# Patient Record
Sex: FEMALE | Race: BLACK OR AFRICAN AMERICAN | NOT HISPANIC OR LATINO | Employment: UNEMPLOYED | URBAN - METROPOLITAN AREA
[De-identification: names, ages, dates, MRNs, and addresses within clinical notes are randomized per-mention and may not be internally consistent; named-entity substitution may affect disease eponyms.]

---

## 2019-01-07 ENCOUNTER — OFFICE VISIT (OUTPATIENT)
Dept: OBGYN CLINIC | Facility: MEDICAL CENTER | Age: 44
End: 2019-01-07
Payer: COMMERCIAL

## 2019-01-07 VITALS
DIASTOLIC BLOOD PRESSURE: 77 MMHG | HEIGHT: 62 IN | BODY MASS INDEX: 30.14 KG/M2 | SYSTOLIC BLOOD PRESSURE: 117 MMHG | HEART RATE: 73 BPM | WEIGHT: 163.8 LBS

## 2019-01-07 DIAGNOSIS — M77.12 LATERAL EPICONDYLITIS OF LEFT ELBOW: Primary | ICD-10-CM

## 2019-01-07 PROCEDURE — 99203 OFFICE O/P NEW LOW 30 MIN: CPT | Performed by: ORTHOPAEDIC SURGERY

## 2019-01-07 PROCEDURE — 20551 NJX 1 TENDON ORIGIN/INSJ: CPT | Performed by: ORTHOPAEDIC SURGERY

## 2019-01-07 RX ORDER — MULTIVITAMIN
1 TABLET ORAL DAILY
COMMUNITY
End: 2020-12-15

## 2019-01-07 RX ORDER — NAPROXEN 500 MG/1
500 TABLET ORAL 2 TIMES DAILY PRN
Refills: 0 | COMMUNITY
Start: 2018-11-27 | End: 2020-12-15

## 2019-01-07 RX ORDER — TRIAMCINOLONE ACETONIDE 40 MG/ML
40 INJECTION, SUSPENSION INTRA-ARTICULAR; INTRAMUSCULAR
Status: COMPLETED | OUTPATIENT
Start: 2019-01-07 | End: 2019-01-07

## 2019-01-07 RX ORDER — LIDOCAINE HYDROCHLORIDE 10 MG/ML
1 INJECTION, SOLUTION INFILTRATION; PERINEURAL
Status: COMPLETED | OUTPATIENT
Start: 2019-01-07 | End: 2019-01-07

## 2019-01-07 RX ADMIN — TRIAMCINOLONE ACETONIDE 40 MG: 40 INJECTION, SUSPENSION INTRA-ARTICULAR; INTRAMUSCULAR at 09:58

## 2019-01-07 RX ADMIN — LIDOCAINE HYDROCHLORIDE 1 ML: 10 INJECTION, SOLUTION INFILTRATION; PERINEURAL at 09:58

## 2019-01-07 NOTE — PROGRESS NOTES
CHIEF COMPLAINT:  Chief Complaint   Patient presents with    Left Elbow - Pain       SUBJECTIVE:  Patric Francis is a 37y o  year old RHD female who presents for initial evaluation of left elbow pain that started after shoveling show in mid 2018  She denies any trauma or previous injury  Denies numbness or tingling  She has tried an elbow brace, but this caused more pain  She has tried Naproxen with only temporary relief  She previously had xrays at [facility] on 2018  This has not improved over time  PAST MEDICAL HISTORY:  History reviewed  No pertinent past medical history  PAST SURGICAL HISTORY:  Past Surgical History:   Procedure Laterality Date     SECTION      GALLBLADDER SURGERY      KNEE SURGERY         FAMILY HISTORY:  History reviewed  No pertinent family history  SOCIAL HISTORY:  Social History   Substance Use Topics    Smoking status: Never Smoker    Smokeless tobacco: Never Used    Alcohol use No       MEDICATIONS:    Current Outpatient Prescriptions:     Multiple Vitamin (MULTIVITAMIN) tablet, Take 1 tablet by mouth daily, Disp: , Rfl:     naproxen (NAPROSYN) 500 mg tablet, Take 500 mg by mouth 2 (two) times a day as needed, Disp: , Rfl: 0    ALLERGIES:  Allergies   Allergen Reactions    Bactrim [Sulfamethoxazole-Trimethoprim]               REVIEW OF SYSTEMS:  Review of Systems   Constitutional: Negative for chills, fever and unexpected weight change  Advised to f/u with PCP about cough and sore throat  HENT: Positive for sore throat  Negative for hearing loss and nosebleeds  Eyes: Negative for pain, redness and visual disturbance  Respiratory: Positive for cough  Negative for shortness of breath and wheezing  Cardiovascular: Negative for chest pain, palpitations and leg swelling  Gastrointestinal: Negative for abdominal pain, nausea and vomiting  Endocrine: Negative for polydipsia and polyuria     Genitourinary: Negative for dysuria and hematuria  Musculoskeletal: Positive for arthralgias and joint swelling  Negative for myalgias  Skin: Negative for rash and wound  Neurological: Negative for dizziness, numbness and headaches  Psychiatric/Behavioral: Negative for decreased concentration and suicidal ideas  The patient is not nervous/anxious  VITALS:  Vitals:    01/07/19 0935   BP: 117/77   Pulse: 73       LABS:  HgA1c: No results found for: HGBA1C  BMP: No results found for: GLUCOSE, CALCIUM, NA, K, CO2, CL, BUN, CREATININE    _____________________________________________________  PHYSICAL EXAMINATION:  General: well developed and well nourished, alert, oriented times 3 and appears comfortable  Psychiatric: Normal  HEENT: Trachea Midline, No torticollis  Pulmonary: No audible wheezing or respiratory distress   Skin: No masses, erthema, lacerations, fluctation, ulcerations  Neurovascular: Sensation Intact to the Median, Ulnar, Radial Nerve, Motor Intact to the Median, Ulnar, Radial Nerve and Pulses Intact    MUSCULOSKELETAL EXAMINATION:  Left Elbow:    No swelling or deformity  Full range of motion with flexion, extension, supination and pronation  Positive tenderness to palpation over the Lateral Epicondyle  Very minimal tenderness over the medial epicondyle  Pain with passive flexion of the wrist with elbow fully extended  Pain with resisted wrist extension with elbow fully extended  Pain with resisted forearm supination with the elbow fully extended  Negative tinels over the ulnar nerve at the elbow  ___________________________________________________  STUDIES REVIEWED:  Images obtained on 12/20/2018 of the left elbow at [facility] (uploaded into PACS) 4 views demonstrate no evidence of fracture or dislocation  Joint spaces are well maintained  Xrays of the left forearm on 12/20/2018 showed no fracture or pathology        PROCEDURES PERFORMED:  Left elbow lateral epicondylitis cortisone injection  Date/Time: 1/7/2019 9:58 AM  Consent given by: patient  Supporting Documentation  Indications: pain   Procedure Details  Condition:lateral epicondylitis Site: L elbow   Needle size: 25 G  Medications administered: 1 mL lidocaine 1 %; 40 mg triamcinolone acetonide 40 mg/mL  Patient tolerance: patient tolerated the procedure well with no immediate complications  Dressing:  Sterile dressing applied            _____________________________________________________  ASSESSMENT/PLAN:    Left elbow lateral epicondylitis  * Cortisone injection given today  She may have some pain at injection site  *  Heat and stretching exercises daily, and dynamic exercises using 2-3 pound weights  These exercises were demonstrated today  Handout about condition provided  * There may be lightening of the skin at the injection site  * Continue heat and NSAIDS  Follow Up:  Return in about 3 weeks (around 1/28/2019)  To Do Next Visit:  Re-evaluation of current issue    General Discussions:  Lateral Epicondylitis: The anatomy and physiology of lateral epicondylitis was discussed with the patient today  Typically, degenerative changes in the extensor carpi radialis brevis muscle occur over time  These degenerative changes appear as tears of the tendon on MRI  This creates pain over the lateral epicondyle (outside part of elbow)  This pain typically is made worse with palm down lifting activities as well as anything that involves strength and stability of the wrist   The pain may radiate from the wrist up to the elbow  At times, the shoulder may be weak as well which can predispose or cause continuation of the problem  Conservative treatment usually cures a majority of patients; however, this may take up to 6-9 months  Conservative treatment options typically include activity modification, therapy for strengthening of the shoulder and elbow, tennis elbow straps, and possible corticosteroid injections  Corticosteroid injections are very effective at relieving pain but do not alter the natural history of this process  Rather, steroid injections decrease the pain temporarily to allow for therapy to take place without discomfort  It was discussed that therapy to prevent recurrence is a "life long" process and that if patient relies on the steroid injection alone without performing therapeutic exercises the risk of recurrence is likely  Typical home regimen includes heat and stretching and resisted wrist extension exercises discussed in the office  Surgery is required in fewer than 10% of patients      Scribe Attestation    I,:   Brittany Lomeli PA-C am acting as a scribe while in the presence of the attending physician :        I,:   Adonay Morrow MD personally performed the services described in this documentation    as scribed in my presence :

## 2019-01-28 ENCOUNTER — OFFICE VISIT (OUTPATIENT)
Dept: OBGYN CLINIC | Facility: MEDICAL CENTER | Age: 44
End: 2019-01-28
Payer: COMMERCIAL

## 2019-01-28 VITALS
HEIGHT: 62 IN | WEIGHT: 160.4 LBS | RESPIRATION RATE: 16 BRPM | DIASTOLIC BLOOD PRESSURE: 74 MMHG | SYSTOLIC BLOOD PRESSURE: 113 MMHG | BODY MASS INDEX: 29.52 KG/M2 | HEART RATE: 71 BPM

## 2019-01-28 DIAGNOSIS — M77.12 LATERAL EPICONDYLITIS OF LEFT ELBOW: Primary | ICD-10-CM

## 2019-01-28 PROCEDURE — 99212 OFFICE O/P EST SF 10 MIN: CPT | Performed by: ORTHOPAEDIC SURGERY

## 2019-01-28 NOTE — PROGRESS NOTES
CHIEF COMPLAINT:  Chief Complaint   Patient presents with    Left Elbow - Pain       SUBJECTIVE:  Karly Rivas is a 53y o  year old RHD female who presents for f/u to lateral epicondylitis on the left  She was seen on 19 and received a cortisone injection, which helped about 95%  She was also given a HEP which she has been doing  She only uses occasional heat and NSAIDS  PAST MEDICAL HISTORY:  Past Medical History:   Diagnosis Date    Shoulder pain, right 2019       PAST SURGICAL HISTORY:  Past Surgical History:   Procedure Laterality Date     SECTION      GALLBLADDER SURGERY      KNEE SURGERY         FAMILY HISTORY:  Family History   Problem Relation Age of Onset    Rheum arthritis Mother     Cancer Father        SOCIAL HISTORY:  Social History   Substance Use Topics    Smoking status: Never Smoker    Smokeless tobacco: Never Used    Alcohol use No       MEDICATIONS:    Current Outpatient Prescriptions:     Multiple Vitamin (MULTIVITAMIN) tablet, Take 1 tablet by mouth daily, Disp: , Rfl:     naproxen (NAPROSYN) 500 mg tablet, Take 500 mg by mouth 2 (two) times a day as needed, Disp: , Rfl: 0    ALLERGIES:  Allergies   Allergen Reactions    Bactrim [Sulfamethoxazole-Trimethoprim]               REVIEW OF SYSTEMS:  Review of Systems   Constitutional: Negative for chills, fever and unexpected weight change  HENT: Negative for hearing loss, nosebleeds and sore throat  Eyes: Negative for pain, redness and visual disturbance  Respiratory: Negative for cough, shortness of breath and wheezing  Cardiovascular: Negative for chest pain, palpitations and leg swelling  Gastrointestinal: Negative for abdominal pain, nausea and vomiting  Endocrine: Negative for polydipsia and polyuria  Genitourinary: Negative for dysuria and hematuria  Musculoskeletal: Positive for arthralgias  Negative for joint swelling and myalgias  Skin: Negative for rash and wound     Neurological: Negative for dizziness, numbness and headaches  Psychiatric/Behavioral: Negative for decreased concentration and suicidal ideas  The patient is not nervous/anxious  VITALS:  Vitals:    01/28/19 0850   BP: 113/74   Pulse: 71   Resp: 16       LABS:  HgA1c: No results found for: HGBA1C  BMP: No results found for: GLUCOSE, CALCIUM, NA, K, CO2, CL, BUN, CREATININE    _____________________________________________________  PHYSICAL EXAMINATION:  General: well developed and well nourished, alert, oriented times 3 and appears comfortable  Psychiatric: Normal  HEENT: Trachea Midline, No torticollis  Pulmonary: No audible wheezing or respiratory distress   Skin: No masses, erthema, lacerations, fluctation, ulcerations  Neurovascular: Sensation Intact to the Median, Ulnar, Radial Nerve, Motor Intact to the Median, Ulnar, Radial Nerve and Pulses Intact    MUSCULOSKELETAL EXAMINATION:  Left Elbow:    No swelling or deformity  Full range of motion with flexion, extension, supination and pronation  Mildly tender to palpation  No pain with passive flexion of the wrist with elbow fully extended  No pain with resisted wrist extension with elbow fully extended  No pain with resisted forearm supination with the elbow fully extended  Negative tinels over the ulnar nerve at the elbow  ___________________________________________________  STUDIES REVIEWED:  No studies reviewed  PROCEDURES PERFORMED:  Procedures  No Procedures performed today    _____________________________________________________  ASSESSMENT/PLAN:    Left lateral epicondylitis with great improvement after CSI injection  * Continue Heat, NSAIDS and stretching exercises  * F/u in clinic if pain gets worse/doesnt resolve    Diagnoses and all orders for this visit:    Lateral epicondylitis of left elbow        Follow Up:  Return if symptoms worsen or fail to improve        To Do Next Visit:  Re-evaluation of current issue    General Discussions:  Lateral Epicondylitis: The anatomy and physiology of lateral epicondylitis was discussed with the patient today  Typically, degenerative changes in the extensor carpi radialis brevis muscle occur over time  These degenerative changes appear as tears of the tendon on MRI  This creates pain over the lateral epicondyle (outside part of elbow)  This pain typically is made worse with palm down lifting activities as well as anything that involves strength and stability of the wrist   The pain may radiate from the wrist up to the elbow  At times, the shoulder may be weak as well which can predispose or cause continuation of the problem  Conservative treatment usually cures a majority of patients; however, this may take up to 6-9 months  Conservative treatment options typically include activity modification, therapy for strengthening of the shoulder and elbow, tennis elbow straps, and possible corticosteroid injections  Corticosteroid injections are very effective at relieving pain but do not alter the natural history of this process  Rather, steroid injections decrease the pain temporarily to allow for therapy to take place without discomfort  It was discussed that therapy to prevent recurrence is a "life long" process and that if patient relies on the steroid injection alone without performing therapeutic exercises the risk of recurrence is likely  Typical home regimen includes heat and stretching and resisted wrist extension exercises discussed in the office  Surgery is required in fewer than 10% of patients        Scribe Attestation    I,:   Sarah Bryant PA-C am acting as a scribe while in the presence of the attending physician :        I,:   Chris Dorsey MD personally performed the services described in this documentation    as scribed in my presence :

## 2020-04-02 ENCOUNTER — DOCUMENTATION (OUTPATIENT)
Dept: FAMILY MEDICINE CLINIC | Facility: CLINIC | Age: 45
End: 2020-04-02

## 2020-04-02 ENCOUNTER — TELEPHONE (OUTPATIENT)
Dept: FAMILY MEDICINE CLINIC | Facility: CLINIC | Age: 45
End: 2020-04-02

## 2020-07-13 ENCOUNTER — TELEPHONE (OUTPATIENT)
Dept: FAMILY MEDICINE CLINIC | Facility: CLINIC | Age: 45
End: 2020-07-13

## 2020-07-13 NOTE — TELEPHONE ENCOUNTER
Patient has a hard bump on the roof of her mouth, she used salt water and she said its hurts to eat anything hot or hard  Is there something she can get otc, any prescription?   She has a bactrum allergy  Her pharmacy is [facility]    Patient ph # [phone]

## 2020-07-13 NOTE — TELEPHONE ENCOUNTER
Can try anbesol or something like that she'd have to look at the pharmacy-chances are it'll resolve in a few days but if not she should have an oral surgeon look at it

## 2020-08-29 ENCOUNTER — HOSPITAL ENCOUNTER (EMERGENCY)
Facility: HOSPITAL | Age: 45
Discharge: HOME | End: 2020-08-29
Attending: EMERGENCY MEDICINE | Admitting: EMERGENCY MEDICINE
Payer: COMMERCIAL

## 2020-08-29 ENCOUNTER — APPOINTMENT (EMERGENCY)
Dept: CT IMAGING | Facility: HOSPITAL | Age: 45
End: 2020-08-29
Payer: COMMERCIAL

## 2020-08-29 VITALS
RESPIRATION RATE: 16 BRPM | OXYGEN SATURATION: 99 % | WEIGHT: 176.15 LBS | DIASTOLIC BLOOD PRESSURE: 73 MMHG | TEMPERATURE: 98.4 F | SYSTOLIC BLOOD PRESSURE: 112 MMHG | HEART RATE: 72 BPM | BODY MASS INDEX: 32.22 KG/M2

## 2020-08-29 DIAGNOSIS — G51.0 BELL'S PALSY: Primary | ICD-10-CM

## 2020-08-29 LAB
ALBUMIN SERPL BCP-MCNC: 3.6 G/DL (ref 3.5–5)
ALP SERPL-CCNC: 78 U/L (ref 46–116)
ALT SERPL W P-5'-P-CCNC: 22 U/L (ref 12–78)
ANION GAP SERPL CALCULATED.3IONS-SCNC: 9 MMOL/L (ref 4–13)
AST SERPL W P-5'-P-CCNC: 17 U/L (ref 5–45)
BASOPHILS # BLD AUTO: 0.03 THOUSANDS/ΜL (ref 0–0.1)
BASOPHILS NFR BLD AUTO: 1 % (ref 0–1)
BILIRUB SERPL-MCNC: 0.19 MG/DL (ref 0.2–1)
BUN SERPL-MCNC: 12 MG/DL (ref 5–25)
CALCIUM SERPL-MCNC: 8.7 MG/DL (ref 8.3–10.1)
CHLORIDE SERPL-SCNC: 104 MMOL/L (ref 100–108)
CO2 SERPL-SCNC: 26 MMOL/L (ref 21–32)
CREAT SERPL-MCNC: 0.95 MG/DL (ref 0.6–1.3)
EOSINOPHIL # BLD AUTO: 0.1 THOUSAND/ΜL (ref 0–0.61)
EOSINOPHIL NFR BLD AUTO: 2 % (ref 0–6)
ERYTHROCYTE [DISTWIDTH] IN BLOOD BY AUTOMATED COUNT: 12.6 % (ref 11.6–15.1)
GFR SERPL CREATININE-BSD FRML MDRD: 82 ML/MIN/1.73SQ M
GLUCOSE SERPL-MCNC: 110 MG/DL (ref 65–140)
HCT VFR BLD AUTO: 37.2 % (ref 34.8–46.1)
HGB BLD-MCNC: 11.7 G/DL (ref 11.5–15.4)
IMM GRANULOCYTES # BLD AUTO: 0.01 THOUSAND/UL (ref 0–0.2)
IMM GRANULOCYTES NFR BLD AUTO: 0 % (ref 0–2)
LYMPHOCYTES # BLD AUTO: 2.39 THOUSANDS/ΜL (ref 0.6–4.47)
LYMPHOCYTES NFR BLD AUTO: 40 % (ref 14–44)
MCH RBC QN AUTO: 28.3 PG (ref 26.8–34.3)
MCHC RBC AUTO-ENTMCNC: 31.5 G/DL (ref 31.4–37.4)
MCV RBC AUTO: 90 FL (ref 82–98)
MONOCYTES # BLD AUTO: 0.29 THOUSAND/ΜL (ref 0.17–1.22)
MONOCYTES NFR BLD AUTO: 5 % (ref 4–12)
NEUTROPHILS # BLD AUTO: 3.1 THOUSANDS/ΜL (ref 1.85–7.62)
NEUTS SEG NFR BLD AUTO: 52 % (ref 43–75)
NRBC BLD AUTO-RTO: 0 /100 WBCS
PLATELET # BLD AUTO: 290 THOUSANDS/UL (ref 149–390)
PMV BLD AUTO: 9.4 FL (ref 8.9–12.7)
POTASSIUM SERPL-SCNC: 3.5 MMOL/L (ref 3.5–5.3)
PROT SERPL-MCNC: 8.4 G/DL (ref 6.4–8.2)
RBC # BLD AUTO: 4.14 MILLION/UL (ref 3.81–5.12)
SODIUM SERPL-SCNC: 139 MMOL/L (ref 136–145)
WBC # BLD AUTO: 5.92 THOUSAND/UL (ref 4.31–10.16)

## 2020-08-29 PROCEDURE — G1004 CDSM NDSC: HCPCS

## 2020-08-29 PROCEDURE — 85025 COMPLETE CBC W/AUTO DIFF WBC: CPT | Performed by: EMERGENCY MEDICINE

## 2020-08-29 PROCEDURE — 99284 EMERGENCY DEPT VISIT MOD MDM: CPT

## 2020-08-29 PROCEDURE — 36415 COLL VENOUS BLD VENIPUNCTURE: CPT | Performed by: EMERGENCY MEDICINE

## 2020-08-29 PROCEDURE — 86618 LYME DISEASE ANTIBODY: CPT | Performed by: EMERGENCY MEDICINE

## 2020-08-29 PROCEDURE — 70450 CT HEAD/BRAIN W/O DYE: CPT

## 2020-08-29 PROCEDURE — 99284 EMERGENCY DEPT VISIT MOD MDM: CPT | Performed by: EMERGENCY MEDICINE

## 2020-08-29 PROCEDURE — 80053 COMPREHEN METABOLIC PANEL: CPT | Performed by: EMERGENCY MEDICINE

## 2020-08-29 RX ORDER — PREDNISONE 20 MG/1
60 TABLET ORAL ONCE
Status: COMPLETED | OUTPATIENT
Start: 2020-08-29 | End: 2020-08-29

## 2020-08-29 RX ORDER — PREDNISONE 10 MG/1
TABLET ORAL
Qty: 63 TABLET | Refills: 0 | Status: SHIPPED | OUTPATIENT
Start: 2020-08-29 | End: 2020-12-15

## 2020-08-29 RX ORDER — MINERAL OIL, PETROLATUM 425; 573 MG/G; MG/G
OINTMENT OPHTHALMIC AS NEEDED
Qty: 3.5 G | Refills: 0 | Status: SHIPPED | OUTPATIENT
Start: 2020-08-29 | End: 2020-12-15

## 2020-08-29 RX ADMIN — FLUORESCEIN SODIUM 1 STRIP: 1 STRIP OPHTHALMIC at 21:41

## 2020-08-29 RX ADMIN — PREDNISONE 60 MG: 20 TABLET ORAL at 21:40

## 2020-08-29 NOTE — ED PROVIDER NOTES
History  Chief Complaint   Patient presents with    Facial Numbness     leftsided facial facial numbness seen at [facility] sent to ER for evaluation  symptoms began last night      49 y/o female presents today reporting right sided facial weakness that started approximately 24 hours ago  States that her right eye is irritated and watery  No difficulty speaking or swallowing  Did have a hard time drinking from a straw tonight while at dinner  No extremity weakness, no difficulty walking  [facility] and sent here for further evaluation  History provided by:  Patient  CVA/TIA-like Symptoms   Presenting symptoms: weakness (right face)    Presenting symptoms: no confusion    Date/time of last known well:  2020 7:00 PM  Onset quality:  Unable to specify  Timing:  Constant  Progression:  Unable to specify  Similar to previous episodes: no    Associated symptoms: no chest pain, no trouble swallowing, no dizziness, no facial pain, no fever, no hearing loss, no bladder incontinence, no nausea, no seizures, no tinnitus and no vertigo        Prior to Admission Medications   Prescriptions Last Dose Informant Patient Reported? Taking?    Multiple Vitamin (MULTIVITAMIN) tablet  Self Yes No   Sig: Take 1 tablet by mouth daily   naproxen (NAPROSYN) 500 mg tablet  Self Yes No   Sig: Take 500 mg by mouth 2 (two) times a day as needed      Facility-Administered Medications: None       Past Medical History:   Diagnosis Date    Allergies     bactrim, IV contrast dye    Shoulder pain, right 2019       Past Surgical History:   Procedure Laterality Date     SECTION      CHOLECYSTECTOMY  2016    COLONOSCOPY  2019    COLONOSCOPY  2016    GALLBLADDER SURGERY      KNEE SURGERY      TUBAL LIGATION         Family History   Problem Relation Age of Onset    Rheum arthritis Mother     Lupus Mother     Cancer Father     Heart disease Father         bypass    Melanoma Father     Liver cancer Maternal Grandmother      I have reviewed and agree with the history as documented  E-Cigarette/Vaping     E-Cigarette/Vaping Substances     Social History     Tobacco Use    Smoking status: Never Smoker    Smokeless tobacco: Never Used   Substance Use Topics    Alcohol use: Yes     Comment: occasional    Drug use: No       Review of Systems   Constitutional: Negative for fever  HENT: Negative for congestion, facial swelling, hearing loss, sinus pressure, tinnitus and trouble swallowing  Eyes: Negative for visual disturbance  Respiratory: Negative for chest tightness and shortness of breath  Cardiovascular: Negative for chest pain and leg swelling  Gastrointestinal: Negative for nausea  Genitourinary: Negative for bladder incontinence  Musculoskeletal: Negative for back pain  Neurological: Positive for facial asymmetry and weakness (right face)  Negative for dizziness, vertigo and seizures  Psychiatric/Behavioral: Negative for confusion  Physical Exam  Physical Exam  Vitals signs and nursing note reviewed  Constitutional:       Appearance: She is well-developed  HENT:      Head: Normocephalic and atraumatic  Mouth/Throat:      Mouth: Mucous membranes are moist       Pharynx: Uvula midline  Tonsils: No tonsillar exudate  Eyes:      General:         Right eye: Discharge (watery) present  No foreign body or hordeolum  Extraocular Movements:      Right eye: Normal extraocular motion  Left eye: Normal extraocular motion  Conjunctiva/sclera:      Right eye: Right conjunctiva is injected (mild)  Pupils: Pupils are equal, round, and reactive to light  Right eye: No corneal abrasion or fluorescein uptake  Comments: No fluorescein uptake in the right eye   Neck:      Musculoskeletal: Normal range of motion and neck supple  Cardiovascular:      Rate and Rhythm: Normal rate and regular rhythm     Pulmonary:      Effort: Pulmonary effort is normal       Breath sounds: Normal breath sounds  Abdominal:      General: Bowel sounds are normal       Palpations: Abdomen is soft  Tenderness: There is no abdominal tenderness  There is no guarding or rebound  Musculoskeletal:         General: No tenderness or deformity  Skin:     General: Skin is warm and dry  Capillary Refill: Capillary refill takes less than 2 seconds  Neurological:      Mental Status: She is alert  Comments: Patient has a mild right facial droop which involves the right eye and the right forehead  She is unable to completely close her right eye         Psychiatric:         Mood and Affect: Mood normal          Behavior: Behavior normal          Vital Signs  ED Triage Vitals [08/29/20 1916]   Temperature Pulse Respirations Blood Pressure SpO2   98 4 °F (36 9 °C) 70 18 145/76 100 %      Temp Source Heart Rate Source Patient Position - Orthostatic VS BP Location FiO2 (%)   Oral Monitor Sitting Right arm --      Pain Score       --           Vitals:    08/29/20 2015 08/29/20 2030 08/29/20 2045 08/29/20 2100   BP: 114/65 112/68  112/73   Pulse: 70 68 70 72   Patient Position - Orthostatic VS: Sitting Sitting  Sitting         Visual Acuity  Visual Acuity      Most Recent Value   L Pupil Size (mm)  4   R Pupil Size (mm)  4          ED Medications  Medications   predniSONE tablet 60 mg (60 mg Oral Given 8/29/20 2140)   fluorescein sodium sterile ophthalmic strip 1 strip (1 strip Right Eye Given by Other 8/29/20 2141)       Diagnostic Studies  Results Reviewed     Procedure Component Value Units Date/Time    Comprehensive metabolic panel [[id number]]  (Abnormal) Collected:  08/29/20 2014    Lab Status:  Final result Specimen:  Blood from Arm, Left Updated:  08/29/20 2035     Sodium 139 mmol/L      Potassium 3 5 mmol/L      Chloride 104 mmol/L      CO2 26 mmol/L      ANION GAP 9 mmol/L      BUN 12 mg/dL      Creatinine 0 95 mg/dL      Glucose 110 mg/dL Calcium 8 7 mg/dL      AST 17 U/L      ALT 22 U/L      Alkaline Phosphatase 78 U/L      Total Protein 8 4 g/dL      Albumin 3 6 g/dL      Total Bilirubin 0 19 mg/dL      eGFR 82 ml/min/1 73sq m     Narrative:       [place] guidelines for Chronic Kidney Disease (CKD):     Stage 1 with normal or high GFR (GFR > 90 mL/min/1 73 square meters)    Stage 2 Mild CKD (GFR = 60-89 mL/min/1 73 square meters)    Stage 3A Moderate CKD (GFR = 45-59 mL/min/1 73 square meters)    Stage 3B Moderate CKD (GFR = 30-44 mL/min/1 73 square meters)    Stage 4 Severe CKD (GFR = 15-29 mL/min/1 73 square meters)    Stage 5 End Stage CKD (GFR <15 mL/min/1 73 square meters)  Note: GFR calculation is accurate only with a steady state creatinine    CBC and differential [[id number]] Collected:  08/29/20 2014    Lab Status:  Final result Specimen:  Blood from Arm, Left Updated:  08/29/20 2021     WBC 5 92 Thousand/uL      RBC 4 14 Million/uL      Hemoglobin 11 7 g/dL      Hematocrit 37 2 %      MCV 90 fL      MCH 28 3 pg      MCHC 31 5 g/dL      RDW 12 6 %      MPV 9 4 fL      Platelets 913 Thousands/uL      nRBC 0 /100 WBCs      Neutrophils Relative 52 %      Immat GRANS % 0 %      Lymphocytes Relative 40 %      Monocytes Relative 5 %      Eosinophils Relative 2 %      Basophils Relative 1 %      Neutrophils Absolute 3 10 Thousands/µL      Immature Grans Absolute 0 01 Thousand/uL      Lymphocytes Absolute 2 39 Thousands/µL      Monocytes Absolute 0 29 Thousand/µL      Eosinophils Absolute 0 10 Thousand/µL      Basophils Absolute 0 03 Thousands/µL     Lyme Antibody Profile with reflex to [facility] [[id number]] Collected:  08/29/20 2014    Lab Status: In process Specimen:  Blood from Arm, Left Updated:  08/29/20 2019                 CT head without contrast   Final Result by Godfrey Rogers MD (08/29 2047)      No acute intracranial abnormality                    Workstation performed: [id number] Procedures  Procedures         ED Course       US AUDIT      Most Recent Value   Initial Alcohol Screen: US AUDIT-C    1  How often do you have a drink containing alcohol?  0 Filed at: 08/29/2020 1915   2  How many drinks containing alcohol do you have on a typical day you are drinking? 0 Filed at: 08/29/2020 1915   3a  Male UNDER 65: How often do you have five or more drinks on one occasion? 0 Filed at: 08/29/2020 1915   3b  FEMALE Any Age, or MALE 65+: How often do you have 4 or more drinks on one occassion? 0 Filed at: 08/29/2020 1915   Audit-C Score  0 Filed at: 08/29/2020 1915                  HARPREET/DAST-10      Most Recent Value   How many times in the past year have you    Used an illegal drug or used a prescription medication for non-medical reasons? Never Filed at: 08/29/2020 1915                                MDM  Number of Diagnoses or Management Options  Bell's palsy: new and requires workup  Diagnosis management comments: 10:00 PM  Late entry - physical exam is consistent with [place] Palsy  CT head negative  Only facial weakness that does involve the eyelid and forehead on the right  Stained the right eye with Fluroscein without uptake  CT head negative for acute pathology  Labs unremarkable  Lyme titer pending, however patient has no known tick exposure to her knowledge  History and physical most consistent with a Bell's Palsy  Will treat with prednisone and recommend f/u with PCP as outpatient  Given prescription for lacri-lube and education provided on eye protection, including taping the eye closed at night while sleeping  Patient is stable for discharge  RTED instructions reviewed            Amount and/or Complexity of Data Reviewed  Clinical lab tests: ordered and reviewed  Tests in the radiology section of CPT®: ordered and reviewed  Tests in the medicine section of CPT®: ordered and reviewed  Independent visualization of images, tracings, or specimens: yes    Risk of Complications, Morbidity, and/or Mortality  Presenting problems: high  Diagnostic procedures: high  Management options: high    Patient Progress  Patient progress: stable        Disposition  Final diagnoses:   Bell's palsy     Time reflects when diagnosis was documented in both MDM as applicable and the Disposition within this note     Time User Action Codes Description Comment    8/29/2020  8:33 PM Bettina Romero Add [G51 0] Bell's palsy       ED Disposition     ED Disposition Condition Date/Time Comment    Discharge Stable Sat Aug 29, 2020  9:06 PM Sonalkim Llanoss discharge to home/self care  Follow-up Information     Follow up With Specialties Details Why Contact Info Additional Information    Pranay Matias MD Pediatrics Schedule an appointment as soon as possible for a visit  As needed [address]  [facility] [address]  [facility] Schedule an appointment as soon as possible for a visit   [address] [facility] [address] [facility], [address] [facility] [address] [address] Emergency Department Emergency Medicine  If symptoms worsen [address] [facility] [address], [address], [place], [place], [address]          Discharge Medication List as of 8/29/2020  9:07 PM      START taking these medications    Details   predniSONE 10 mg tablet Take 6 tabs PO for 3 days, then 5 tabs PO for 3 days, then 4 tabs PO for 3 days, then 3 tabs PO for 3 days, then 2 tabs PO for 3 days then 1 tab PO for 3 days  , Normal      White Petrolatum-Mineral Oil (artificial tears) Administer to the right eye as needed for dry eyes, Starting Sat 8/29/2020, Normal         CONTINUE these medications which have NOT CHANGED    Details   Multiple Vitamin (MULTIVITAMIN) tablet Take 1 tablet by mouth daily, Historical Med      naproxen (NAPROSYN) 500 mg tablet Take 500 mg by mouth 2 (two) times a day as needed, Starting Tue 11/27/2018, Historical Med           No discharge procedures on file      PDMP Review     None          ED Provider  Electronically Signed by           Dillon Luna DO  08/29/20 8829

## 2020-08-30 NOTE — DISCHARGE INSTRUCTIONS
You should use saline drops for eye hydration during the day  Use a water based eye lubricant (such as Genteal PM) at night to prevent dryness  Gently tape your eye closed at night after using lacri-lube to keep eye moisturized and prevent abrasion/injury to the eye

## 2020-09-01 LAB — B BURGDOR IGG+IGM SER-ACNC: <0.91 ISR (ref 0–0.9)

## 2020-09-03 ENCOUNTER — APPOINTMENT (OUTPATIENT)
Dept: LAB | Facility: CLINIC | Age: 45
End: 2020-09-03
Payer: COMMERCIAL

## 2020-09-03 ENCOUNTER — CONSULT (OUTPATIENT)
Dept: NEUROLOGY | Facility: CLINIC | Age: 45
End: 2020-09-03
Payer: COMMERCIAL

## 2020-09-03 VITALS
DIASTOLIC BLOOD PRESSURE: 73 MMHG | TEMPERATURE: 97.3 F | WEIGHT: 176.6 LBS | RESPIRATION RATE: 16 BRPM | HEART RATE: 70 BPM | SYSTOLIC BLOOD PRESSURE: 128 MMHG | BODY MASS INDEX: 32.5 KG/M2 | HEIGHT: 62 IN

## 2020-09-03 DIAGNOSIS — H53.2 DOUBLE VISION: ICD-10-CM

## 2020-09-03 DIAGNOSIS — G51.0 FACIAL PARALYSIS ON RIGHT SIDE: ICD-10-CM

## 2020-09-03 DIAGNOSIS — G51.0 FACIAL PARALYSIS ON RIGHT SIDE: Primary | ICD-10-CM

## 2020-09-03 LAB
BUN SERPL-MCNC: 10 MG/DL (ref 5–25)
CREAT SERPL-MCNC: 0.75 MG/DL (ref 0.6–1.3)
CRP SERPL QL: <3 MG/L
ERYTHROCYTE [SEDIMENTATION RATE] IN BLOOD: 36 MM/HOUR (ref 0–19)
GFR SERPL CREATININE-BSD FRML MDRD: 110 ML/MIN/1.73SQ M

## 2020-09-03 PROCEDURE — 86430 RHEUMATOID FACTOR TEST QUAL: CPT

## 2020-09-03 PROCEDURE — 99205 OFFICE O/P NEW HI 60 MIN: CPT | Performed by: PSYCHIATRY & NEUROLOGY

## 2020-09-03 PROCEDURE — 86140 C-REACTIVE PROTEIN: CPT

## 2020-09-03 PROCEDURE — 86617 LYME DISEASE ANTIBODY: CPT

## 2020-09-03 PROCEDURE — 86235 NUCLEAR ANTIGEN ANTIBODY: CPT

## 2020-09-03 PROCEDURE — 85652 RBC SED RATE AUTOMATED: CPT

## 2020-09-03 PROCEDURE — 82565 ASSAY OF CREATININE: CPT

## 2020-09-03 PROCEDURE — 86038 ANTINUCLEAR ANTIBODIES: CPT

## 2020-09-03 PROCEDURE — 36415 COLL VENOUS BLD VENIPUNCTURE: CPT

## 2020-09-03 PROCEDURE — 86225 DNA ANTIBODY NATIVE: CPT

## 2020-09-03 PROCEDURE — 84520 ASSAY OF UREA NITROGEN: CPT

## 2020-09-03 PROCEDURE — 86618 LYME DISEASE ANTIBODY: CPT

## 2020-09-03 RX ORDER — LORAZEPAM 1 MG/1
TABLET ORAL
Qty: 2 TABLET | Refills: 0 | Status: SHIPPED | OUTPATIENT
Start: 2020-09-03 | End: 2020-10-07 | Stop reason: ALTCHOICE

## 2020-09-03 NOTE — PROGRESS NOTES
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 - Primary    Relevant Medications    LORazepam (ATIVAN) 1 mg tablet    Other Relevant Orders    MRI brain MS wo and w contrast    XIOMY Screen w/ Reflex to Titer/Pattern    dsDNA Antibody by IFA, Crithidia luciliae, with Reflex to Titer    Sjogren's Antibodies    Sm/RNP Antibody    RF Screen w/ Reflex to Titer    Lyme Antibody Profile with reflex to WB    Sedimentation rate, automated    C-reactive protein    Ambulatory referral to Physical Therapy       Other    Double vision    Relevant Orders    BUN    Creatinine, serum    XIOMY Screen w/ Reflex to Titer/Pattern    dsDNA Antibody by IFA, Crithidia luciliae, with Reflex to Titer    Sjogren's Antibodies    Sm/RNP Antibody    RF Screen w/ Reflex to Titer    Lyme Antibody Profile with reflex to WB    Sedimentation rate, automated    C-reactive protein            Mrs Art Woods has presented to [address] [place] [address] for evaluation of right facial weakness and left facial numbness:  - patient developed sensory dysfunction left side if her face, right facial paralysis and double vision on horizontal gaze to the right   - considering strong family history of SLE/RA, concern was brought for multiple craniopathies  in the setting of underlying undiagnosed autoimmune dysfunction  - serologic work up and MRI brain w/wo highly advised to rule out CNS demyelination due to MS/NMO/SLE/Sjogren's  - patient completed prednisone, no antibacterial therapy provided, Lyme was negative, symptoms of right facial paralysis are mild   - PT and acupuncture will be advised   - no other focal neurologic deficit noted, recent travel to [address] [place] [address]    FU in 2 months     Subjective: right facial weakness    HPI  Mrs Art Woods is a 49 yo female who was referred to [address] [place] [address] for evaluation of facial paralysis  Patient has known ascites 2009, lateral epicondylitis  ER visit on 2020:prednisone 60 mg was provided  leftsided facial facial numbness seen at [facility] sent to ER for evaluation  symptoms began last night       CT head 2020: normal  Patient described staying in door on 1 leg birthday but on finding morning she started developing life facial numbness, while her  and her cell were outside and traveling to [place]  Patient also had lunch outside same day with right sided facial weakness has been recognized by evening time during the during the  Patient reports no hearing loss, no changes in her taste, no significant headache  Patient also reports her eye were watering and by late night she was recognized to have droopy face patient went to emergency department on Saturday morning and was diagnosed with Bell's palsy  Patient family history significant for lupus and rheumatoid arthritis in her mother  The following portions of the patient's history were reviewed and updated as appropriate: She  has a past medical history of Allergies and Shoulder pain, right (2019)  She   Patient Active Problem List    Diagnosis Date Noted    Double vision 2020    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 2020    Lateral epicondylitis of left elbow 2019     She  has a past surgical history that includes Knee surgery; Gallbladder surgery;  section; Tubal ligation; Colonoscopy (2019); Cholecystectomy (2016); and Colonoscopy (2016)  Her family history includes Cancer in her father; Heart disease in her father; Liver cancer in her maternal grandmother; Lupus in her mother; Melanoma in her father; Rheum arthritis in her mother  She  reports that she has never smoked  She has never used smokeless tobacco  She reports current alcohol use  She reports that she does not use drugs    Current Outpatient Medications   Medication Sig Dispense Refill    predniSONE 10 mg tablet Take 6 tabs PO for 3 days, then 5 tabs PO for 3 days, then 4 tabs PO for 3 days, then 3 tabs PO for 3 days, then 2 tabs PO for 3 days then 1 tab PO for 3 days  63 tablet 0    White Petrolatum-Mineral Oil (artificial tears) Administer to the right eye as needed for dry eyes 3 5 g 0    LORazepam (ATIVAN) 1 mg tablet Take 1 tab 40 min prior to MRI with a second tab 10 min prior to imaging 2 tablet 0    Multiple Vitamin (MULTIVITAMIN) tablet Take 1 tablet by mouth daily      naproxen (NAPROSYN) 500 mg tablet Take 500 mg by mouth 2 (two) times a day as needed  0     No current facility-administered medications for this visit  Current Outpatient Medications on File Prior to Visit   Medication Sig    predniSONE 10 mg tablet Take 6 tabs PO for 3 days, then 5 tabs PO for 3 days, then 4 tabs PO for 3 days, then 3 tabs PO for 3 days, then 2 tabs PO for 3 days then 1 tab PO for 3 days   White Petrolatum-Mineral Oil (artificial tears) Administer to the right eye as needed for dry eyes    Multiple Vitamin (MULTIVITAMIN) tablet Take 1 tablet by mouth daily    naproxen (NAPROSYN) 500 mg tablet Take 500 mg by mouth 2 (two) times a day as needed     No current facility-administered medications on file prior to visit  She is allergic to bactrim [sulfamethoxazole-trimethoprim]            Objective:    Blood pressure 128/73, pulse 70, temperature (!) 97 3 °F (36 3 °C), temperature source Temporal, resp  rate 16, height 5' 2" (1 575 m), weight 80 1 kg (176 lb 9 6 oz), last menstrual period 08/22/2020  Physical Exam    Neurological Exam  CONSTITUTIONAL: NAD, pleasant  NECK: supple, no lymphadenopathy, no thyromegaly, no JVD  CARDIOVASCULAR: RRR, normal S1S2, no murmurs, no rubs  RESP: clear to auscultation bilaterally, no wheezes/rhonchi/rales  ABDOMEN: soft, non tender, non distended  SKIN: no rash or skin lesions   EXTREMITIES: no edema, pulses 2+bilaterally  PSYCH: appropriate mood and affect  NEUROLOGIC COMPREHENSIVE EXAM: Patient is oriented to person, place and time, NAD; appropriate affect  CN II, III, IV, V, VI, VII,VIII,IX,X,XI-XII intact with EOMI, PERRLA, OKN intact, VF grossly intact, fundi poorly visualized secondary to pupillary constriction; mild asymmetric face noted, full closure of her right eye; numbness left lower face, double vision on horizontal gaze to the right  Motor: 5/5 UE/LE bilateral symmetric; Sensory: intact to light touch and pinprick bilaterally; normal vibration sensation feet bilaterally; Coordination within normal limits on FTN and CHRISTI testing; DTR: 2/4 through, no Babinski, no clonus  Tandem gait is intact  Romberg: negative  ROS:  12 points of review of system was reviewed with the patient and was unremarkable with exception: see HPI  Review of Systems  Constitutional: Negative  Negative for appetite change and fever  HENT: Negative  Negative for hearing loss, tinnitus, trouble swallowing and voice change  Mouth swelling   Eyes: Positive for photophobia  Negative for pain  Feel like sand in the eye   Respiratory: Negative  Negative for shortness of breath  Cardiovascular: Negative  Negative for palpitations  Gastrointestinal: Negative  Negative for nausea and vomiting  Endocrine: Negative  Negative for cold intolerance  Genitourinary: Negative  Negative for dysuria, frequency and urgency  Musculoskeletal: Negative  Negative for myalgias and neck pain  Skin: Negative  Negative for rash  Allergic/Immunologic: Negative  Neurological: Positive for dizziness, weakness, numbness (tingling in the eyes) and headaches  Negative for tremors, seizures, syncope, facial asymmetry, speech difficulty and light-headedness  Hard to chew on right side of face and pressure  Trouble focusing   Hematological: Negative  Does not bruise/bleed easily  Psychiatric/Behavioral: Negative   Negative for confusion, hallucinations and sleep disturbance

## 2020-09-03 NOTE — PROGRESS NOTES
Review of Systems   Constitutional: Negative  Negative for appetite change and fever  HENT: Negative  Negative for hearing loss, tinnitus, trouble swallowing and voice change  Mouth swelling   Eyes: Positive for photophobia  Negative for pain  Feel like sand in the eye   Respiratory: Negative  Negative for shortness of breath  Cardiovascular: Negative  Negative for palpitations  Gastrointestinal: Negative  Negative for nausea and vomiting  Endocrine: Negative  Negative for cold intolerance  Genitourinary: Negative  Negative for dysuria, frequency and urgency  Musculoskeletal: Negative  Negative for myalgias and neck pain  Skin: Negative  Negative for rash  Allergic/Immunologic: Negative  Neurological: Positive for dizziness, weakness, numbness (tingling in the eyes) and headaches  Negative for tremors, seizures, syncope, facial asymmetry, speech difficulty and light-headedness  Hard to chew on right side of face and pressure  Trouble focusing   Hematological: Negative  Does not bruise/bleed easily  Psychiatric/Behavioral: Negative  Negative for confusion, hallucinations and sleep disturbance

## 2020-09-04 DIAGNOSIS — G51.0 FACIAL PARALYSIS ON RIGHT SIDE: Primary | ICD-10-CM

## 2020-09-04 DIAGNOSIS — R76.8 ANTI-RNP ANTIBODIES PRESENT: ICD-10-CM

## 2020-09-04 LAB
ENA RNP AB SER-ACNC: 1.5 AI (ref 0–0.9)
ENA SM AB SER-ACNC: <0.2 AI (ref 0–0.9)
ENA SS-A AB SER-ACNC: <0.2 AI (ref 0–0.9)
ENA SS-B AB SER-ACNC: <0.2 AI (ref 0–0.9)
RHEUMATOID FACT SER QL LA: NEGATIVE
RYE IGE QN: NEGATIVE

## 2020-09-05 LAB
B BURGDOR IGG PATRN SER IB-IMP: NEGATIVE
B BURGDOR IGG+IGM SER-ACNC: 0.94 ISR (ref 0–0.9)
B BURGDOR IGM PATRN SER IB-IMP: NEGATIVE
B BURGDOR18KD IGG SER QL IB: ABNORMAL
B BURGDOR23KD IGG SER QL IB: ABNORMAL
B BURGDOR23KD IGM SER QL IB: ABNORMAL
B BURGDOR28KD IGG SER QL IB: ABNORMAL
B BURGDOR30KD IGG SER QL IB: ABNORMAL
B BURGDOR39KD IGG SER QL IB: ABNORMAL
B BURGDOR39KD IGM SER QL IB: PRESENT
B BURGDOR41KD IGG SER QL IB: PRESENT
B BURGDOR41KD IGM SER QL IB: ABNORMAL
B BURGDOR45KD IGG SER QL IB: ABNORMAL
B BURGDOR58KD IGG SER QL IB: ABNORMAL
B BURGDOR66KD IGG SER QL IB: ABNORMAL
B BURGDOR93KD IGG SER QL IB: ABNORMAL

## 2020-09-08 ENCOUNTER — TELEPHONE (OUTPATIENT)
Dept: NEUROLOGY | Facility: CLINIC | Age: 45
End: 2020-09-08

## 2020-09-08 NOTE — TELEPHONE ENCOUNTER
----- Message from Chago Neff MD sent at 9/4/2020  5:20 PM EDT -----  Please review mildly elevated RNP antibodies with the patient, concern for lupus - she is to follow with rheumatology, referral is available

## 2020-09-08 NOTE — PROGRESS NOTES
Assessment and Plan:   Ms Baldo Garcia is a 49-year-old female with no significant past medical history, who presents for further evaluation of right-sided facial weakness thought to be consistent with Bell's palsy and an elevated RNP antibody of 1 5  She is referred by Dr Shayna Jasmine for a rheumatology consult  English Judah presents today for further evaluation of a borderline elevated RNP antibody that was detected due to a presumed episode of right-sided Bell's palsy that occurred approximately 10 days ago  Based on her current presentation her symptoms do appear to be representative of Bell's palsy and I recommend she continue with the steroid taper as directed by the emergency room  Apart from this episode she denies comorbid conditions and her review of systems as well as physical examination is entirely unrevealing  My suspicion that the RNP antibody is significant in the context of her right facial weakness is low, as she does not present with alternate features concerning for an underlying connective tissue disorder  In addition the antibody titer is a low positive with a negative XIOMY screen  - To further evaluate I would like to complete the workup as listed below and also obtain an XIOMY by immunofluorescence  I will also follow on the results of the MRI brain  Based off this workup I will determine if a sooner follow up is indicated, otherwise I recommend she continue management as directed for idiopathic Bell's palsy  - I will plan to see her back for a routine office visit in 6 months, but advised her if she is asymptomatic at that time she can cancel the appointment  Plan:  Diagnoses and all orders for this visit:    Anti-RNP antibodies present  -     MISCELLANEOUS LAB TEST; Future  -     Anti-Annette 1 Antibody; Future  -     Beta-2 glycoprotein antibodies; Future  -     C3 complement; Future  -     C4 complement; Future  -     Cardiolipin antibody;  Future  -     Chronic Hepatitis Panel; Future  -     CK; Future  -     Cyclic citrul peptide antibody, IgG; Future  -     HIV 1/2 Antigen/Antibody (4th Generation) w Reflex [facility]; Future  -     Lupus anticoagulant; Future  -     Protein / creatinine ratio, urine  -     Urinalysis with microscopic  -     Ambulatory referral to Rheumatology    Elevated sed rate    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  Comments:  ? Bell's    Numbness and tingling of right side of face    Current use of steroid medication    Family history of systemic lupus erythematosus (SLE) in mother      I have personally reviewed pertinent films in PACS of the CT head which is normal        Activities as tolerated    Diet: low carb/low fat, more greens/vegetables, adequate hydration  Exercise: try to maintain a low impact exercise regimen as much as possible  Walk for 30 minutes a day for at least 3 days a week    Encouraged to maintain good sleep hygiene  Continue other medications as prescribed by PCP and other specialists        RTC in 6 months  HPI  Ms Shane Nguyen is a 57-year-old female with no significant past medical history, who presents for further evaluation of right-sided facial weakness thought to be consistent with Bell's palsy and an elevated RNP antibody of 1 5  She is referred by Dr Aiden Dougherty for a rheumatology consult  Patient reports on 08/28 while she was out at dinner, she had sudden onset of a weakness sensation on the right half of her face, but with drooping of the left side of her mouth, as well as a sensation of numbness on the right side of her face  She had difficulty with closing her right eye and swishing water in her mouth  As there was no improvement noted in her symptoms the next day she presented to the emergency room on 08/29 after being directed to do so by the urgent care    A CT of her head was done which was normal   A CBC, CMP and Lyme antibody profile were normal   Her presentation was thought to be consistent with right-sided Bell's palsy and she was started on oral prednisone at 60 mg once daily and directed to taper by 10 mg every 3 days  She is currently on prednisone at 30 mg once daily  She was also referred to Neurology  She was seen by Dr Hugo Whitehead on 09/03 and further testing was ordered to rule out demyelination verses an autoimmune cause for her symptoms, given a strong family history of lupus and rheumatoid arthritis  She is scheduled for an MRI of her brain on 09/25  Lab workup showed the borderline positive RNP antibody of 1 5  An XIOMY by EIA was negative  Sjogren's antibodies, rheumatoid factor, Lyme Western blot, C reactive protein and anti Smith antibody were also negative  The ESR was slightly elevated at 36  A double-stranded DNA antibody is still pending  She was referred to Rheumatology to further evaluate the positive RNP antibody  In the past 10 days since the onset of her symptoms and starting the prednisone, she states that the right-sided weakness and paresthesias have been improving  She is only able to notice subtle changes at this time but still has difficulty with completely shutting her right eye  Other than this episode, she reports a benign past medical history and denies any comorbid conditions  She reports a family history of lupus and rheumatoid arthritis in her mother, multiple myeloma in her father and a cousin with lupus  She personally denies fevers, unintentional weight loss, hair loss, sicca symptoms, skin rash, photosensitivity, psoriasis, skin thickening/tightening, mouth/nose ulcers, sinus disease, epistaxis, swollen glands, pleuritic chest pain, shortness of breath, cough, hemoptysis, difficulty swallowing, acid reflux, abdominal issues, blood in stools, blood clots, miscarriages, Raynaud's, focal weakness, numbness/tingling of her extremities or significant joint pain/swelling/stiffness        The following portions of the patient's history were reviewed and updated as appropriate: allergies, current medications, past family history, past medical history, past social history, past surgical history and problem list       Review of Systems  Constitutional: Negative for weight change, fevers, chills, night sweats, fatigue  ENT/Mouth: Negative for hearing changes, ear pain, nasal congestion, sinus pain, hoarseness, sore throat, rhinorrhea, swallowing difficulty  Eyes: Negative for pain, redness, discharge, vision changes  Cardiovascular: Negative for chest pain, SOB, palpitations  Respiratory: Negative for cough, sputum, wheezing, dyspnea  Gastrointestinal: Negative for nausea, vomiting, diarrhea, constipation, pain, heartburn  Genitourinary: Negative for dysuria, urinary frequency, hematuria  Musculoskeletal: As per HPI  Skin: Negative for skin rash, color changes  Neuro: Negative for weakness, numbness, tingling, loss of consciousness  Psych: Negative for anxiety, depression  Heme/Lymph: Negative for easy bruising, bleeding, lymphadenopathy          Past Medical History:   Diagnosis Date    Allergies     bactrim, IV contrast dye    Shoulder pain, right 2019       Past Surgical History:   Procedure Laterality Date     SECTION      CHOLECYSTECTOMY  2016    COLONOSCOPY  2019    COLONOSCOPY  2016    GALLBLADDER SURGERY      KNEE SURGERY      TUBAL LIGATION         Social History     Socioeconomic History    Marital status: /Civil Union     Spouse name: Not on file    Number of children: Not on file    Years of education: Not on file    Highest education level: Not on file   Occupational History    Not on file   Social Needs    Financial resource strain: Not on file    Food insecurity     Worry: Not on file     Inability: Not on file   [employer] needs     Medical: Not on file     Non-medical: Not on file   Tobacco Use    Smoking status: Never Smoker    Smokeless tobacco: Never Used   Substance and Sexual Activity    Alcohol use: Yes     Frequency: Monthly or less     Drinks per session: 1 or 2     Comment: occasional    Drug use: No    Sexual activity: Not on file   Lifestyle    Physical activity     Days per week: Not on file     Minutes per session: Not on file    Stress: Not on file   Relationships    Social connections     Talks on phone: Not on file     Gets together: Not on file     Attends Sabianism service: Not on file     Active member of club or organization: Not on file     Attends meetings of clubs or organizations: Not on file     Relationship status: Not on file    Intimate partner violence     Fear of current or ex partner: Not on file     Emotionally abused: Not on file     Physically abused: Not on file     Forced sexual activity: Not on file   Other Topics Concern    Not on file   Social History Narrative    · Occupation:   house wife         Per oly        Family History   Problem Relation Age of Onset    Rheum arthritis Mother     Lupus Mother     Cancer Father     Heart disease Father         bypass    Melanoma Father     Liver cancer Maternal Grandmother        Allergies   Allergen Reactions    Bactrim [Sulfamethoxazole-Trimethoprim]               Current Outpatient Medications:     LORazepam (ATIVAN) 1 mg tablet, Take 1 tab 40 min prior to MRI with a second tab 10 min prior to imaging, Disp: 2 tablet, Rfl: 0    Multiple Vitamin (MULTIVITAMIN) tablet, Take 1 tablet by mouth daily, Disp: , Rfl:     naproxen (NAPROSYN) 500 mg tablet, Take 500 mg by mouth 2 (two) times a day as needed, Disp: , Rfl: 0    predniSONE 10 mg tablet, Take 6 tabs PO for 3 days, then 5 tabs PO for 3 days, then 4 tabs PO for 3 days, then 3 tabs PO for 3 days, then 2 tabs PO for 3 days then 1 tab PO for 3 days  , Disp: 63 tablet, Rfl: 0    White Petrolatum-Mineral Oil (artificial tears), Administer to the right eye as needed for dry eyes, Disp: 3 5 g, Rfl: 0      Objective:    Vitals:    09/09/20 1148   BP: 110/70 Pulse: 68   Temp: 98 6 °F (37 °C)   TempSrc: Tympanic   Weight: 80 1 kg (176 lb 9 4 oz)   Height: 5' 2" (1 575 m)       Physical Exam  General: Well appearing, well nourished, in no distress  Oriented x 3, normal mood and affect  Ambulating without difficulty  Skin: Good turgor, no rash, unusual bruising or prominent lesions  Hair: Normal texture and distribution  Nails: Normal color, no deformities  HEENT:  Head: Normocephalic, atraumatic  Eyes: Conjunctiva clear, sclera non-icteric, EOM intact  Nose: No external lesions, mucosa non-inflamed  Mouth: Mucous membranes moist, no mucosal lesions  Neck: Supple, thyroid non-enlarged and non-tender  No lymphadenopathy  Extremities: No amputations or deformities, cyanosis, edema  Musculoskeletal:  There is no peripheral joint soft tissue swelling noted  Neurologic: Alert and oriented  She has slightly reduced sensation to light touch on the right side of her face  She has mild difficulty in keeping her right eye closed against resistance  There is no obvious facial drooping noted and she is able to produce wrinkles on the right forehead  Psychiatric: Normal mood and affect  Dorthey Schirmer, M D    Rheumatology

## 2020-09-09 ENCOUNTER — OFFICE VISIT (OUTPATIENT)
Dept: RHEUMATOLOGY | Facility: CLINIC | Age: 45
End: 2020-09-09
Payer: COMMERCIAL

## 2020-09-09 ENCOUNTER — TRANSCRIBE ORDERS (OUTPATIENT)
Dept: LAB | Facility: CLINIC | Age: 45
End: 2020-09-09

## 2020-09-09 ENCOUNTER — LAB (OUTPATIENT)
Dept: LAB | Facility: CLINIC | Age: 45
End: 2020-09-09
Payer: COMMERCIAL

## 2020-09-09 VITALS
HEIGHT: 62 IN | WEIGHT: 176.59 LBS | SYSTOLIC BLOOD PRESSURE: 110 MMHG | HEART RATE: 68 BPM | BODY MASS INDEX: 32.5 KG/M2 | TEMPERATURE: 98.6 F | DIASTOLIC BLOOD PRESSURE: 70 MMHG

## 2020-09-09 DIAGNOSIS — Z82.69 FAMILY HISTORY OF SYSTEMIC LUPUS ERYTHEMATOSUS (SLE) IN MOTHER: ICD-10-CM

## 2020-09-09 DIAGNOSIS — R20.0 NUMBNESS AND TINGLING OF RIGHT SIDE OF FACE: ICD-10-CM

## 2020-09-09 DIAGNOSIS — G51.0 FACIAL PARALYSIS ON RIGHT SIDE: ICD-10-CM

## 2020-09-09 DIAGNOSIS — Z79.52 CURRENT USE OF STEROID MEDICATION: ICD-10-CM

## 2020-09-09 DIAGNOSIS — R20.2 NUMBNESS AND TINGLING OF RIGHT SIDE OF FACE: ICD-10-CM

## 2020-09-09 DIAGNOSIS — R70.0 ELEVATED SED RATE: ICD-10-CM

## 2020-09-09 DIAGNOSIS — R76.8 ANTI-RNP ANTIBODIES PRESENT: ICD-10-CM

## 2020-09-09 DIAGNOSIS — R76.8 ANTI-RNP ANTIBODIES PRESENT: Primary | ICD-10-CM

## 2020-09-09 LAB
BACTERIA UR QL AUTO: ABNORMAL /HPF
BILIRUB UR QL STRIP: NEGATIVE
C3 SERPL-MCNC: 135 MG/DL (ref 90–180)
C4 SERPL-MCNC: 27 MG/DL (ref 10–40)
CK SERPL-CCNC: 37 U/L (ref 26–192)
CLARITY UR: CLEAR
COLOR UR: YELLOW
CREAT UR-MCNC: 218 MG/DL
GLUCOSE UR STRIP-MCNC: NEGATIVE MG/DL
HBV CORE AB SER QL: NORMAL
HBV CORE IGM SER QL: NORMAL
HBV SURFACE AG SER QL: NORMAL
HCV AB SER QL: NORMAL
HGB UR QL STRIP.AUTO: NEGATIVE
KETONES UR STRIP-MCNC: ABNORMAL MG/DL
LEUKOCYTE ESTERASE UR QL STRIP: NEGATIVE
NITRITE UR QL STRIP: NEGATIVE
NON-SQ EPI CELLS URNS QL MICRO: ABNORMAL /HPF
PH UR STRIP.AUTO: 5.5 [PH]
PROT UR STRIP-MCNC: NEGATIVE MG/DL
PROT UR-MCNC: 15 MG/DL
PROT/CREAT UR: 0.07 MG/G{CREAT} (ref 0–0.1)
RBC #/AREA URNS AUTO: ABNORMAL /HPF
SP GR UR STRIP.AUTO: >=1.03 (ref 1–1.03)
UROBILINOGEN UR QL STRIP.AUTO: 0.2 E.U./DL
WBC #/AREA URNS AUTO: ABNORMAL /HPF

## 2020-09-09 PROCEDURE — 86705 HEP B CORE ANTIBODY IGM: CPT

## 2020-09-09 PROCEDURE — 81001 URINALYSIS AUTO W/SCOPE: CPT | Performed by: INTERNAL MEDICINE

## 2020-09-09 PROCEDURE — 85705 THROMBOPLASTIN INHIBITION: CPT

## 2020-09-09 PROCEDURE — 86160 COMPLEMENT ANTIGEN: CPT

## 2020-09-09 PROCEDURE — 85732 THROMBOPLASTIN TIME PARTIAL: CPT

## 2020-09-09 PROCEDURE — 99205 OFFICE O/P NEW HI 60 MIN: CPT | Performed by: INTERNAL MEDICINE

## 2020-09-09 PROCEDURE — 86200 CCP ANTIBODY: CPT

## 2020-09-09 PROCEDURE — 82570 ASSAY OF URINE CREATININE: CPT | Performed by: INTERNAL MEDICINE

## 2020-09-09 PROCEDURE — 86146 BETA-2 GLYCOPROTEIN ANTIBODY: CPT

## 2020-09-09 PROCEDURE — 86704 HEP B CORE ANTIBODY TOTAL: CPT

## 2020-09-09 PROCEDURE — 86235 NUCLEAR ANTIGEN ANTIBODY: CPT

## 2020-09-09 PROCEDURE — 86038 ANTINUCLEAR ANTIBODIES: CPT

## 2020-09-09 PROCEDURE — 84156 ASSAY OF PROTEIN URINE: CPT | Performed by: INTERNAL MEDICINE

## 2020-09-09 PROCEDURE — 86803 HEPATITIS C AB TEST: CPT

## 2020-09-09 PROCEDURE — 87389 HIV-1 AG W/HIV-1&-2 AB AG IA: CPT

## 2020-09-09 PROCEDURE — 87340 HEPATITIS B SURFACE AG IA: CPT

## 2020-09-09 PROCEDURE — 85613 RUSSELL VIPER VENOM DILUTED: CPT

## 2020-09-09 PROCEDURE — 82550 ASSAY OF CK (CPK): CPT

## 2020-09-09 PROCEDURE — 85670 THROMBIN TIME PLASMA: CPT

## 2020-09-09 PROCEDURE — 86147 CARDIOLIPIN ANTIBODY EA IG: CPT

## 2020-09-09 PROCEDURE — 36415 COLL VENOUS BLD VENIPUNCTURE: CPT

## 2020-09-10 LAB
CARDIOLIPIN IGA SER IA-ACNC: <9 APL U/ML (ref 0–11)
CARDIOLIPIN IGG SER IA-ACNC: <9 GPL U/ML (ref 0–14)
CARDIOLIPIN IGM SER IA-ACNC: <9 MPL U/ML (ref 0–12)
ENA JO1 AB SER-ACNC: <0.2 AI (ref 0–0.9)
HIV 1+2 AB+HIV1 P24 AG SERPL QL IA: NORMAL

## 2020-09-11 LAB
APTT SCREEN TO CONFIRM RATIO: 1.09 RATIO (ref 0–1.4)
B2 GLYCOPROT1 IGA SER-ACNC: <9 GPI IGA UNITS (ref 0–25)
B2 GLYCOPROT1 IGG SER-ACNC: <9 GPI IGG UNITS (ref 0–20)
B2 GLYCOPROT1 IGM SER-ACNC: <9 GPI IGM UNITS (ref 0–32)
CCP IGA+IGG SERPL IA-ACNC: 7 UNITS (ref 0–19)
CONFIRM APTT/NORMAL: 33.5 SEC (ref 0–55)
LA PPP-IMP: NORMAL
SCREEN APTT: 37.6 SEC (ref 0–51.9)
SCREEN DRVVT: 41 SEC (ref 0–47)
THROMBIN TIME: 18.5 SEC (ref 0–23)

## 2020-09-14 ENCOUNTER — EVALUATION (OUTPATIENT)
Dept: PHYSICAL THERAPY | Facility: CLINIC | Age: 45
End: 2020-09-14
Payer: COMMERCIAL

## 2020-09-14 DIAGNOSIS — G51.0 FACIAL PARALYSIS ON RIGHT SIDE: Primary | ICD-10-CM

## 2020-09-14 PROCEDURE — 97161 PT EVAL LOW COMPLEX 20 MIN: CPT | Performed by: PHYSICAL THERAPIST

## 2020-09-14 PROCEDURE — 97112 NEUROMUSCULAR REEDUCATION: CPT | Performed by: PHYSICAL THERAPIST

## 2020-09-14 NOTE — PROGRESS NOTES
PT Evaluation     Today's date: 2020  Patient name: Karly Rivas  : 1975  MRN: [id number]  Referring provider: Ricky Matias, *  Dx:   Encounter Diagnosis     ICD-10-CM    1  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  G51 0 Ambulatory referral to Physical Therapy     PT plan of care cert/re-cert       Start Time: 1430  Stop Time: 1500  Total time in clinic (min): 30 minutes    Assessment  Assessment details: Karly Rivas is a pleasant 50 y o  female who presents with s/s consistent with mild R sided Bell's Palsy  The patient's greatest concerns are worry over not knowing what's wrong and fear of not being able to keep active  No further referral appears necessary at this time based upon examination results  Primary movement impairment diagnosis of decreased neuromuscular control of facial muscles resulting in pathoanatomical symptoms of 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  (primary encounter diagnosis) and limiting her ability to laugh, close her eyes, and smile  Impairments include:  1) Decreased neuromuscular control of R facial muscles  2) Decreased strength of R facial muscles    Etiologic factors include none recalled by the patient  Discussed risks, benefits, and alternatives to treatment, and answered all patient questions to patient satisfaction  Impairments: abnormal muscle firing, abnormal muscle tone, impaired physical strength and lacks appropriate home exercise program  Understanding of Dx/Px/POC: good   Prognosis: good  Prognosis details: Positive prognostic indicators include positive attitude toward recovery, good understanding of diagnosis and treatment plan options and absence of observed red flags  Goals  Impairment Goals:  1  Patient will demonstrate a symmetrical smile in 4 weeks  2  Patient will be able to close her R eye in 4 weeks    Functional Goals:  1  Patient will be independent with HEP by discharge  2   Patient will be able to blink by discharge    Plan  Plan details: Prognosis above is given PT services 1x/week over the next month and home program adherence  Patient would benefit from: skilled physical therapy  Planned modality interventions: cryotherapy, electrical stimulation/Russian stimulation, high voltage pulsed current: pain management, high voltage pulsed current: spasm management, H-Wave, TENS, thermotherapy: hydrocollator packs and unattended electrical stimulation  Planned therapy interventions: abdominal trunk stabilization, behavior modification, body mechanics training, breathing training, flexibility, functional ROM exercises, home exercise program, joint mobilization, manual therapy, massage, Copeland taping, muscle pump exercises, neuromuscular re-education, patient education, postural training, strengthening, stretching, therapeutic activities, therapeutic exercise and therapeutic training  Frequency: 1x week  Duration in weeks: 4  Treatment plan discussed with: patient        Subjective Evaluation    History of Present Illness  Mechanism of injury: WORK/SCHOOL: 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, 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, 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  HOBBIES/EXERCISE: Patient is not currently exercising  volunteering  PLOF:  No hx of facial issues  HISTORY OF CURRENT INJURY: She started having eyes watering and it got worse, and she felt droopy on the L side of her face  Patient went to urgent care, because she felt heaviness on the L side of her face  Urgent Care sent her to the ER on 8/29/2020  CAT scan negative, blood-work slightly abnormal showing signs of Lupus, neurologist ordered an MRI on her brain which is scheduled for the end of this month  The R side was actually the side that was involved with [place] palsy  She did not notice a significant difference in her face  Prednisone for 2 more days     PAIN LOCATION/DESCRIPTORS: No pain  AGGRAVATING FACTORS:  Spitting, eating, laughing hard, brushing her teeth, closing her R eye all the way  EASES: None  DAY PATTERN: None  IMAGING:  No acute intracranial abnormality on CT  SPECIAL QUESTIONS:    Yokasta Quintanilla denies a new onset of Bladder incontinence, Bowel dysfunction, Dizziness, Dysphagia, Dysarthria, Drop attacks, Diplopia, Nausea, Ataxia, Recent unexplained weight loss, Clumsy or unsteadiness, Constant night pain and History of cancer  PATIENT GOALS: Patient wishes to improve her face back to normal again  Pain  No pain reported  Progression: improved    Patient Goals  Patient goals for therapy: return to sport/leisure activities, independence with ADLs/IADLs, increased strength and increased motion          Objective     General Comments:      Cervical/Thoracic Comments  CN screen negative except for facial nerve (see below)  No issues with raising eyebrows, puckering, platysma shows good activation, puffing cheeks out  Able to close her eyes, delay in opening, less pressure with closing eyes hard  Smile is symmetrical  Able to make "O" shape with mouth  Convergence: 5 5 inches  Cervical ROM screen WNL                Diagnosis: [place] palsy    Precautions:    Primary Goals:  Activation of facial mm   *asterisks by exercise = given for HEP   Manuals 9/14           STM facial mm                                                                     There Ex                                         Neuro Re-Ed             Alternating eye close* 1 min 5 sec holds           Raise eyebrows            Close eyes* 1 min 5 sec holds           Soft smile* 1 min 5 sec holds           Huge smile* 1 min 5 sec holds       Pucker            Frown            Whistle             Squint suspiciously             AEIOU             Nostril flare             Puff out cheeks             Sucking in cheeks             Swishing mouthwash              Ther Act                                         Modalities             Point stimulator

## 2020-09-16 ENCOUNTER — TELEPHONE (OUTPATIENT)
Dept: RHEUMATOLOGY | Facility: CLINIC | Age: 45
End: 2020-09-16

## 2020-09-16 ENCOUNTER — OFFICE VISIT (OUTPATIENT)
Dept: PHYSICAL THERAPY | Facility: CLINIC | Age: 45
End: 2020-09-16
Payer: COMMERCIAL

## 2020-09-16 DIAGNOSIS — G51.0 FACIAL PARALYSIS ON RIGHT SIDE: Primary | ICD-10-CM

## 2020-09-16 LAB — MISCELLANEOUS LAB TEST RESULT: NORMAL

## 2020-09-16 PROCEDURE — 97112 NEUROMUSCULAR REEDUCATION: CPT | Performed by: PHYSICAL THERAPIST

## 2020-09-16 NOTE — TELEPHONE ENCOUNTER
----- Message from Aracelis Fraser MD sent at 9/16/2020  1:46 PM EDT -----  Please let her know all of the lupus and rheumatological tests were negative  At this time I suspect she most likely had Bell's palsy  Please ask her to update me if there are any abnormalities on the MRI brain  Thanks

## 2020-09-16 NOTE — PROGRESS NOTES
Daily Note     Today's date: 2020  Patient name: Zygmunt Shone  : 1975  MRN: [id number]  Referring provider: Al Gonzalez, *  Dx:   Encounter Diagnosis     ICD-10-CM    1  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  G51 0        Start Time: 1400  Stop Time: 1425  Total time in clinic (min): 25 minutes    Subjective: Patient reports a weird sensation around her eyes and some blurry vision after doing the exercises this morning, which went away  Objective: See treatment diary below    Assessment: Tolerated treatment well and she had no difficulty performing the exercises this date  Updated HEP with new exercises and adjusted reps to 5 sec holds x 10 more often throughout the day to allow for muscle recovery  Ended session with point stimulator  Plan: Progress treatment as tolerated  Diagnosis: [place] palsy    Precautions:    Primary Goals:  Activation of facial mm   *asterisks by exercise = given for HEP   Manuals          STM facial mm                                                                     There Ex                                         Neuro Re-Ed             Alternating eye close* 1 min 5 sec holds  x 20 ea not alternating         Raise eyebrows            Close eyes* 1 min 5 sec holds 5 sec x 10         Soft smile* 1 min 5 sec holds           Huge smile* 1 min 5 sec holds       Pucker            Frown   5 sec x 10         Whistle             Squint suspiciously    5 sec x 10         AEIOU    x 10          Nostril flare    5 sec x 10         Puff out cheeks             Sucking in cheeks    5 sec x 10         Swishing mouthwash    5 sec x 10          Ther Act                                         Modalities             Point stimulator   IM, PT

## 2020-09-23 ENCOUNTER — OFFICE VISIT (OUTPATIENT)
Dept: PHYSICAL THERAPY | Facility: CLINIC | Age: 45
End: 2020-09-23
Payer: COMMERCIAL

## 2020-09-23 DIAGNOSIS — G51.0 FACIAL PARALYSIS ON RIGHT SIDE: Primary | ICD-10-CM

## 2020-09-23 PROCEDURE — 97112 NEUROMUSCULAR REEDUCATION: CPT | Performed by: PHYSICAL THERAPIST

## 2020-09-23 PROCEDURE — 97140 MANUAL THERAPY 1/> REGIONS: CPT | Performed by: PHYSICAL THERAPIST

## 2020-09-23 NOTE — PROGRESS NOTES
PT Discharge    Today's date: 2020  Patient name: Manuel Duff  : 1975  MRN: [id number]  Referring provider: Marvin Stratton, *  Dx:   Encounter Diagnosis     ICD-10-CM    1  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  G51 0        Start Time: 1358  Stop Time: 1416  Total time in clinic (min): 18 minutes    Assessment  Assessment details: Manuel Duff has been compliant with attending physical therapy and completing home exercise program since initial evaluation  Holli Carrillo  has made improvements in objective data since initial evalulation and has achieved all goals  Patient reports having returned to their prior level of function  Patient provided with updated Home Exercise Program, all questions answered, and verbalized understanding, agreeing to plan of care  Thus it was mutually decided to discontinue this episode of care and transition to Home Exercise Program      Understanding of Dx/Px/POC: good   Prognosis: good  Prognosis details: Positive prognostic indicators include positive attitude toward recovery, good understanding of diagnosis and treatment plan options and absence of observed red flags  Goals  Impairment Goals:  1  Patient will demonstrate a symmetrical smile in 4 weeks - met  2  Patient will be able to close her R eye in 4 weeks - met    Functional Goals:  1  Patient will be independent with HEP by discharge - met  2  Patient will be able to blink by discharge - met    Plan  Planned therapy interventions: home exercise program  Treatment plan discussed with: patient        Subjective Evaluation    History of Present Illness  Mechanism of injury: WORK/SCHOOL: 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, 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, 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  HOBBIES/EXERCISE: Patient is not currently exercising   volunteering  PLOF:  No hx of facial issues    Re-evaluation:  HISTORY OF CURRENT INJURY: Patient notices her eyes are actually closing now and she feels pretty much normal    PAIN LOCATION/DESCRIPTORS: No pain  AGGRAVATING FACTORS:  None  Improved: Spitting, eating, laughing hard, brushing her teeth, closing her R eye all the way  EASES: None  DAY PATTERN: None  IMAGING:  No acute intracranial abnormality on CT  SPECIAL QUESTIONS:    Avinash Dose denies a new onset of Bladder incontinence, Bowel dysfunction, Dizziness, Dysphagia, Dysarthria, Drop attacks, Diplopia, Nausea, Ataxia, Recent unexplained weight loss, Clumsy or unsteadiness, Constant night pain and History of cancer  PATIENT GOALS: Patient wishes to improve her face back to normal again  - 100% improved  Pain  No pain reported  Progression: resolved    Patient Goals  Patient goals for therapy: return to sport/leisure activities, independence with ADLs/IADLs, increased strength and increased motion          Objective     General Comments:      Cervical/Thoracic Comments  CN screen negative except for facial nerve (see below)  No issues with raising eyebrows, puckering, platysma shows good activation, puffing cheeks out  Able to close her eyes, no delay in opening  Smile is symmetrical  Able to make "O" shape with mouth  Convergence: 5 5 inches  Cervical ROM screen WNL                 Diagnosis: [place] palsy    Precautions:    Primary Goals:  Activation of facial mm   *asterisks by exercise = given for HEP   Manuals 9/14 9/16 9/23       STM facial mm                                                        RE-evaluation      IM, PT       There Ex                                         Neuro Re-Ed             Alternating eye close* 1 min 5 sec holds  x 20 ea not alternating  x1       Raise eyebrows     x1       Close eyes* 1 min 5 sec holds 5 sec x 10  x1       Soft smile* 1 min 5 sec holds    x1       Huge smile* 1 min 5 sec holds  x1     Pucker     x1       Frown   5 sec x 10  x1       Whistle             Squint suspiciously    5 sec x 10         AEIOU    x 10          Nostril flare    5 sec x 10         Puff out cheeks      x1       Sucking in cheeks    5 sec x 10 Swishing mouthwash    5 sec x 10  x1        Ther Act                                         Modalities             Point stimulator - neuro re-ed   IM, PT  IM, PT

## 2020-09-25 ENCOUNTER — HOSPITAL ENCOUNTER (OUTPATIENT)
Dept: MRI IMAGING | Facility: HOSPITAL | Age: 45
Discharge: HOME | End: 2020-09-25
Attending: PSYCHIATRY & NEUROLOGY
Payer: COMMERCIAL

## 2020-09-25 DIAGNOSIS — G51.0 FACIAL PARALYSIS ON RIGHT SIDE: ICD-10-CM

## 2020-09-25 PROCEDURE — G1004 CDSM NDSC: HCPCS

## 2020-09-25 PROCEDURE — A9585 GADOBUTROL INJECTION: HCPCS | Performed by: PSYCHIATRY & NEUROLOGY

## 2020-09-25 PROCEDURE — 70553 MRI BRAIN STEM W/O & W/DYE: CPT

## 2020-09-25 RX ADMIN — GADOBUTROL 8 ML: 604.72 INJECTION INTRAVENOUS at 22:35

## 2020-09-28 ENCOUNTER — TELEPHONE (OUTPATIENT)
Dept: NEUROLOGY | Facility: CLINIC | Age: 45
End: 2020-09-28

## 2020-09-28 DIAGNOSIS — D35.2 PITUITARY MICROADENOMA (HCC): Primary | ICD-10-CM

## 2020-09-30 ENCOUNTER — APPOINTMENT (OUTPATIENT)
Dept: LAB | Facility: CLINIC | Age: 45
End: 2020-09-30
Payer: COMMERCIAL

## 2020-09-30 ENCOUNTER — APPOINTMENT (OUTPATIENT)
Dept: PHYSICAL THERAPY | Facility: CLINIC | Age: 45
End: 2020-09-30
Payer: COMMERCIAL

## 2020-09-30 DIAGNOSIS — D35.2 PITUITARY MICROADENOMA (HCC): ICD-10-CM

## 2020-09-30 LAB
CORTIS AM PEAK SERPL-MCNC: 11 UG/DL (ref 4.2–22.4)
FSH SERPL-ACNC: 41.1 MIU/ML
LH SERPL-ACNC: 21.1 MIU/ML
MISCELLANEOUS LAB TEST RESULT: NORMAL
PROLACTIN SERPL-MCNC: 8.3 NG/ML
TSH SERPL DL<=0.05 MIU/L-ACNC: 1.74 UIU/ML (ref 0.36–3.74)

## 2020-09-30 PROCEDURE — 83001 ASSAY OF GONADOTROPIN (FSH): CPT

## 2020-09-30 PROCEDURE — 84146 ASSAY OF PROLACTIN: CPT

## 2020-09-30 PROCEDURE — 83002 ASSAY OF GONADOTROPIN (LH): CPT

## 2020-09-30 PROCEDURE — 82024 ASSAY OF ACTH: CPT

## 2020-09-30 PROCEDURE — 84305 ASSAY OF SOMATOMEDIN: CPT

## 2020-09-30 PROCEDURE — 82533 TOTAL CORTISOL: CPT

## 2020-09-30 PROCEDURE — 84443 ASSAY THYROID STIM HORMONE: CPT

## 2020-09-30 PROCEDURE — 36415 COLL VENOUS BLD VENIPUNCTURE: CPT

## 2020-10-01 LAB — ACTH PLAS-MCNC: 35.6 PG/ML (ref 7.2–63.3)

## 2020-10-02 LAB — IGF-I SERPL-MCNC: 150 NG/ML (ref 70–225)

## 2020-10-07 ENCOUNTER — OFFICE VISIT (OUTPATIENT)
Dept: NEUROLOGY | Facility: CLINIC | Age: 45
End: 2020-10-07
Payer: COMMERCIAL

## 2020-10-07 VITALS
TEMPERATURE: 96.9 F | BODY MASS INDEX: 32.57 KG/M2 | HEART RATE: 82 BPM | HEIGHT: 62 IN | SYSTOLIC BLOOD PRESSURE: 124 MMHG | DIASTOLIC BLOOD PRESSURE: 82 MMHG | WEIGHT: 177 LBS | RESPIRATION RATE: 16 BRPM

## 2020-10-07 DIAGNOSIS — D35.2 PITUITARY MICROADENOMA (HCC): ICD-10-CM

## 2020-10-07 DIAGNOSIS — R76.8 POSITIVE SM/RNP ANTIBODY: ICD-10-CM

## 2020-10-07 DIAGNOSIS — G51.0 FACIAL PARALYSIS ON RIGHT SIDE: Primary | ICD-10-CM

## 2020-10-07 PROCEDURE — 99214 OFFICE O/P EST MOD 30 MIN: CPT | Performed by: PSYCHIATRY & NEUROLOGY

## 2020-10-30 ENCOUNTER — TELEPHONE (OUTPATIENT)
Dept: NEUROLOGY | Facility: CLINIC | Age: 45
End: 2020-10-30

## 2020-10-30 DIAGNOSIS — G51.0 FACIAL PARALYSIS ON RIGHT SIDE: Primary | ICD-10-CM

## 2020-10-30 RX ORDER — LORAZEPAM 1 MG/1
TABLET ORAL
Qty: 2 TABLET | Refills: 0 | Status: SHIPPED | OUTPATIENT
Start: 2020-10-30 | End: 2020-11-23 | Stop reason: ALTCHOICE

## 2020-11-13 ENCOUNTER — HOSPITAL ENCOUNTER (OUTPATIENT)
Dept: MRI IMAGING | Facility: HOSPITAL | Age: 45
Discharge: HOME | End: 2020-11-13
Attending: PSYCHIATRY & NEUROLOGY

## 2020-11-13 DIAGNOSIS — G51.0 FACIAL PARALYSIS ON RIGHT SIDE: ICD-10-CM

## 2020-11-13 DIAGNOSIS — D35.2 PITUITARY MICROADENOMA (HCC): ICD-10-CM

## 2020-11-13 DIAGNOSIS — H53.2 DOUBLE VISION: ICD-10-CM

## 2020-11-13 DIAGNOSIS — D35.2 PITUITARY MICROADENOMA (HCC): Primary | ICD-10-CM

## 2020-11-23 ENCOUNTER — TELEPHONE (OUTPATIENT)
Dept: NEUROLOGY | Facility: CLINIC | Age: 45
End: 2020-11-23

## 2020-11-23 ENCOUNTER — TELEMEDICINE (OUTPATIENT)
Dept: NEUROLOGY | Facility: CLINIC | Age: 45
End: 2020-11-23
Payer: COMMERCIAL

## 2020-11-23 DIAGNOSIS — R76.8 POSITIVE SM/RNP ANTIBODY: ICD-10-CM

## 2020-11-23 DIAGNOSIS — G51.0 FACIAL PARALYSIS ON RIGHT SIDE: ICD-10-CM

## 2020-11-23 DIAGNOSIS — D35.2 PITUITARY MICROADENOMA (HCC): ICD-10-CM

## 2020-11-23 DIAGNOSIS — G44.52 NEW DAILY PERSISTENT HEADACHE: Primary | ICD-10-CM

## 2020-11-23 DIAGNOSIS — R45.83 EXCESSIVE CRYING OF ADULT: ICD-10-CM

## 2020-11-23 PROCEDURE — 99443 PR PHYS/QHP TELEPHONE EVALUATION 21-30 MIN: CPT | Performed by: PSYCHIATRY & NEUROLOGY

## 2020-12-15 ENCOUNTER — OFFICE VISIT (OUTPATIENT)
Dept: FAMILY MEDICINE CLINIC | Facility: CLINIC | Age: 45
End: 2020-12-15
Payer: COMMERCIAL

## 2020-12-15 VITALS
DIASTOLIC BLOOD PRESSURE: 84 MMHG | SYSTOLIC BLOOD PRESSURE: 118 MMHG | BODY MASS INDEX: 31.88 KG/M2 | OXYGEN SATURATION: 98 % | TEMPERATURE: 98 F | RESPIRATION RATE: 16 BRPM | HEART RATE: 80 BPM | HEIGHT: 62 IN | WEIGHT: 173.25 LBS

## 2020-12-15 DIAGNOSIS — D35.2 PITUITARY MICROADENOMA (HCC): ICD-10-CM

## 2020-12-15 DIAGNOSIS — Z00.00 ANNUAL PHYSICAL EXAM: Primary | ICD-10-CM

## 2020-12-15 DIAGNOSIS — Z01.818 NEEDS PRE-ANESTHESIA ASSESSMENT: ICD-10-CM

## 2020-12-15 DIAGNOSIS — E23.6 ENLARGED PITUITARY GLAND (HCC): ICD-10-CM

## 2020-12-15 DIAGNOSIS — Z12.31 BREAST CANCER SCREENING BY MAMMOGRAM: ICD-10-CM

## 2020-12-15 DIAGNOSIS — G44.52 NEW DAILY PERSISTENT HEADACHE: ICD-10-CM

## 2020-12-15 DIAGNOSIS — Z28.21 INFLUENZA VACCINATION DECLINED BY PATIENT: ICD-10-CM

## 2020-12-15 PROCEDURE — 1036F TOBACCO NON-USER: CPT | Performed by: INTERNAL MEDICINE

## 2020-12-15 PROCEDURE — 99396 PREV VISIT EST AGE 40-64: CPT | Performed by: INTERNAL MEDICINE

## 2020-12-15 PROCEDURE — 3725F SCREEN DEPRESSION PERFORMED: CPT | Performed by: INTERNAL MEDICINE

## 2020-12-15 PROCEDURE — 99214 OFFICE O/P EST MOD 30 MIN: CPT | Performed by: INTERNAL MEDICINE

## 2020-12-15 PROCEDURE — 3008F BODY MASS INDEX DOCD: CPT | Performed by: INTERNAL MEDICINE

## 2020-12-15 NOTE — H&P (VIEW-ONLY)
[address] [facility] FAMILY MEDICINE    NAME: Anastacio Romero  AGE: 50 y o  SEX: female  : 1975     DATE: 12/15/2020     Assessment and Plan:     Problem List Items Addressed This Visit        Endocrine    Pituitary microadenoma ([facility] [address] )       Other    New daily persistent headache      Other Visit Diagnoses     Annual physical exam    -  Primary    Relevant Orders    CBC and differential    Comprehensive metabolic panel    Lipid panel    TSH, 3rd generation    Needs pre-anesthesia assessment        Enlarged pituitary gland ([facility] [address] )        Influenza vaccination declined by patient        Breast cancer screening by mammogram        Relevant Orders    Mammo screening bilateral w 3d & cad      Pt has no hx of CAD, CVA, CHF, insulin requiring DM, or renal failure-has excellent METS and is at low risk for complications of doing an MRI with anesthesia-recommend proceeding with MRI with anesthesia AS PLANNED    Immunizations and preventive care screenings were discussed with patient today  Appropriate education was printed on patient's after visit summary  Counseling:  · Injury prevention: discussed safety/seat belts, safety helmets, smoke detectors, carbon dioxide detectors, and smoking near bedding or upholstery  BMI Counseling: Body mass index is 31 69 kg/m²  The BMI is above normal  Nutrition recommendations include consuming healthier snacks, limiting drinks that contain sugar, moderation in carbohydrate intake and increasing intake of lean protein  Exercise recommendations include exercising 3-5 times per week  No follow-ups on file       Chief Complaint:     Chief Complaint   Patient presents with    Physical Exam     needs to be medically cleared for MRI Brain with anesthesia for enlarged pituitary gland       History of Present Illness:     Adult Annual Physical   Patient here for a comprehensive physical exam  The patient reports problems - recent Bell's palsy, pituitary microadenoma, PTSD  Diet and Physical Activity  · Diet/Nutrition: well balanced diet  · Exercise: no formal exercise  Depression Screening  PHQ-9 Depression Screening    PHQ-9:   Frequency of the following problems over the past two weeks:      Little interest or pleasure in doing things: 1 - several days  Feeling down, depressed, or hopeless: 1 - several days  PHQ-2 Score: 2       General Health  · Sleep: sleeps poorly  · Hearing: normal - bilateral   · Vision: goes for regular eye exams  · Dental: regular dental visits  /GYN Health  · Patient is: perimenopausal  · Last menstrual period: not discussed  · Contraceptive method: not discussed  Review of Systems:     Review of Systems   Constitutional: Negative  HENT: Negative for congestion  Eyes: Negative  Respiratory: Negative  Cardiovascular: Negative  Neurological: Positive for headaches  Psychiatric/Behavioral: Positive for dysphoric mood        Past Medical History:     Past Medical History:   Diagnosis Date    Allergies     bactrim, IV contrast dye    Enlarged pituitary gland (HCC)     Shoulder pain, right 2019      Past Surgical History:     Past Surgical History:   Procedure Laterality Date     SECTION      CHOLECYSTECTOMY  2016    COLONOSCOPY  2019    COLONOSCOPY  2016    GALLBLADDER SURGERY      KNEE SURGERY      TUBAL LIGATION        Social History:        Social History     Socioeconomic History    Marital status: /Civil Union     Spouse name: None    Number of children: None    Years of education: None    Highest education level: None   Occupational History    None   Social Needs    Financial resource strain: None    Food insecurity     Worry: None     Inability: None    Transportation needs     Medical: None     Non-medical: None   Tobacco Use    Smoking status: Never Smoker    Smokeless tobacco: Never Used Substance and Sexual Activity    Alcohol use: Yes     Frequency: Monthly or less     Drinks per session: 1 or 2     Comment: occasional    Drug use: No    Sexual activity: None   Lifestyle    Physical activity     Days per week: None     Minutes per session: None    Stress: None   Relationships    Social connections     Talks on phone: None     Gets together: None     Attends Synagogue service: None     Active member of club or organization: None     Attends meetings of clubs or organizations: None     Relationship status: None    Intimate partner violence     Fear of current or ex partner: None     Emotionally abused: None     Physically abused: None     Forced sexual activity: None   Other Topics Concern    None   Social History Narrative    · Occupation:   house wife         [employer]       Family History:     Family History   Problem Relation Age of Onset    Rheum arthritis Mother     Lupus Mother     Cancer Father     Heart disease Father         bypass    Melanoma Father     Liver cancer Maternal Grandmother       Current Medications:     Current Outpatient Medications   Medication Sig Dispense Refill    Cyanocobalamin (B-12 PO) Take by mouth      MAGNESIUM PO       MELATONIN PO Take by mouth      Riboflavin (B2 PO) Take by mouth       No current facility-administered medications for this visit  Allergies: Allergies   Allergen Reactions    Bactrim [Sulfamethoxazole-Trimethoprim] Other (See Comments)     Suicidal    Contrast Dye [Iodinated Diagnostic Agents] Rash      Physical Exam:     /84 (BP Location: Left arm, Patient Position: Sitting, Cuff Size: Adult)   Pulse 80   Temp 98 °F (36 7 °C) (Temporal)   Resp 16   Ht 5' 2" (1 575 m)   Wt 78 6 kg (173 lb 4 oz)   SpO2 98%   BMI 31 69 kg/m²     Physical Exam  Vitals signs and nursing note reviewed  Constitutional:       General: She is not in acute distress  Appearance: She is well-developed     HENT:      Head: Normocephalic and atraumatic  Right Ear: External ear normal       Left Ear: External ear normal       Nose: Nose normal       Mouth/Throat:      Mouth: Mucous membranes are moist    Eyes:      Conjunctiva/sclera: Conjunctivae normal    Neck:      Musculoskeletal: Neck supple  Cardiovascular:      Rate and Rhythm: Normal rate and regular rhythm  Heart sounds: No murmur  Pulmonary:      Effort: Pulmonary effort is normal  No respiratory distress  Breath sounds: Normal breath sounds  Abdominal:      Palpations: Abdomen is soft  Tenderness: There is no abdominal tenderness  Musculoskeletal: Normal range of motion  Skin:     General: Skin is warm and dry  Neurological:      General: No focal deficit present  Mental Status: She is alert and oriented to person, place, and time  Psychiatric:         Mood and Affect: Mood normal          Behavior: Behavior normal          Thought Content:  Thought content normal           Ha Laguerre MD  [facility]

## 2020-12-23 ENCOUNTER — LAB (OUTPATIENT)
Dept: LAB | Facility: AMBULARY SURGERY CENTER | Age: 45
End: 2020-12-23
Payer: COMMERCIAL

## 2020-12-23 DIAGNOSIS — Z00.00 ANNUAL PHYSICAL EXAM: ICD-10-CM

## 2020-12-23 LAB
ALBUMIN SERPL BCP-MCNC: 3.5 G/DL (ref 3.5–5)
ALP SERPL-CCNC: 74 U/L (ref 46–116)
ALT SERPL W P-5'-P-CCNC: 18 U/L (ref 12–78)
ANION GAP SERPL CALCULATED.3IONS-SCNC: 5 MMOL/L (ref 4–13)
AST SERPL W P-5'-P-CCNC: 12 U/L (ref 5–45)
BASOPHILS # BLD AUTO: 0.04 THOUSANDS/ΜL (ref 0–0.1)
BASOPHILS NFR BLD AUTO: 1 % (ref 0–1)
BILIRUB SERPL-MCNC: 0.27 MG/DL (ref 0.2–1)
BUN SERPL-MCNC: 11 MG/DL (ref 5–25)
CALCIUM SERPL-MCNC: 8.8 MG/DL (ref 8.3–10.1)
CHLORIDE SERPL-SCNC: 112 MMOL/L (ref 100–108)
CHOLEST SERPL-MCNC: 194 MG/DL (ref 50–200)
CO2 SERPL-SCNC: 27 MMOL/L (ref 21–32)
CREAT SERPL-MCNC: 0.82 MG/DL (ref 0.6–1.3)
EOSINOPHIL # BLD AUTO: 0.05 THOUSAND/ΜL (ref 0–0.61)
EOSINOPHIL NFR BLD AUTO: 1 % (ref 0–6)
ERYTHROCYTE [DISTWIDTH] IN BLOOD BY AUTOMATED COUNT: 13.2 % (ref 11.6–15.1)
GFR SERPL CREATININE-BSD FRML MDRD: 99 ML/MIN/1.73SQ M
GLUCOSE P FAST SERPL-MCNC: 98 MG/DL (ref 65–99)
HCT VFR BLD AUTO: 36 % (ref 34.8–46.1)
HDLC SERPL-MCNC: 56 MG/DL
HGB BLD-MCNC: 11.2 G/DL (ref 11.5–15.4)
IMM GRANULOCYTES # BLD AUTO: 0.01 THOUSAND/UL (ref 0–0.2)
IMM GRANULOCYTES NFR BLD AUTO: 0 % (ref 0–2)
LDLC SERPL CALC-MCNC: 96 MG/DL (ref 0–100)
LYMPHOCYTES # BLD AUTO: 2.07 THOUSANDS/ΜL (ref 0.6–4.47)
LYMPHOCYTES NFR BLD AUTO: 43 % (ref 14–44)
MCH RBC QN AUTO: 27.4 PG (ref 26.8–34.3)
MCHC RBC AUTO-ENTMCNC: 31.1 G/DL (ref 31.4–37.4)
MCV RBC AUTO: 88 FL (ref 82–98)
MONOCYTES # BLD AUTO: 0.29 THOUSAND/ΜL (ref 0.17–1.22)
MONOCYTES NFR BLD AUTO: 6 % (ref 4–12)
NEUTROPHILS # BLD AUTO: 2.37 THOUSANDS/ΜL (ref 1.85–7.62)
NEUTS SEG NFR BLD AUTO: 49 % (ref 43–75)
NONHDLC SERPL-MCNC: 138 MG/DL
NRBC BLD AUTO-RTO: 0 /100 WBCS
PLATELET # BLD AUTO: 310 THOUSANDS/UL (ref 149–390)
PMV BLD AUTO: 9.6 FL (ref 8.9–12.7)
POTASSIUM SERPL-SCNC: 3.8 MMOL/L (ref 3.5–5.3)
PROT SERPL-MCNC: 8.1 G/DL (ref 6.4–8.2)
RBC # BLD AUTO: 4.09 MILLION/UL (ref 3.81–5.12)
SODIUM SERPL-SCNC: 144 MMOL/L (ref 136–145)
TRIGL SERPL-MCNC: 209 MG/DL
TSH SERPL DL<=0.05 MIU/L-ACNC: 1.38 UIU/ML (ref 0.36–3.74)
WBC # BLD AUTO: 4.83 THOUSAND/UL (ref 4.31–10.16)

## 2020-12-23 PROCEDURE — 85025 COMPLETE CBC W/AUTO DIFF WBC: CPT

## 2020-12-23 PROCEDURE — 36415 COLL VENOUS BLD VENIPUNCTURE: CPT

## 2020-12-23 PROCEDURE — 80061 LIPID PANEL: CPT

## 2020-12-23 PROCEDURE — 84443 ASSAY THYROID STIM HORMONE: CPT

## 2020-12-23 PROCEDURE — 80053 COMPREHEN METABOLIC PANEL: CPT

## 2021-01-08 ENCOUNTER — ANESTHESIA EVENT (OUTPATIENT)
Dept: RADIOLOGY | Facility: HOSPITAL | Age: 46
End: 2021-01-08

## 2021-01-08 NOTE — PRE-PROCEDURE INSTRUCTIONS
Pre-Surgery Instructions:   Medication Instructions    Cyanocobalamin (B-12 PO) pt instructed to not take on day of mri    MAGNESIUM PO pt instructed to not take on day of mri     MELATONIN PO pt instructed to not take on day of mri     Riboflavin (B2 PO) pt instructed to not take on day of mri     Pt aware of needing a ride home from [place] and covid visitor restrictions  Pt denies fever, sob, sore throat and cough

## 2021-01-08 NOTE — PRE-PROCEDURE INSTRUCTIONS
Pre-Surgery Instructions:   Medication Instructions    Cyanocobalamin (B-12 PO) pt instructed to not take on day of mri    MAGNESIUM PO pt instructed to not take on day of mri    MELATONIN PO pt instructed to not take on day of mri    Riboflavin (B2 PO) pt instructed to not take on day of mri

## 2021-01-13 ENCOUNTER — HOSPITAL ENCOUNTER (OUTPATIENT)
Dept: RADIOLOGY | Facility: HOSPITAL | Age: 46
Discharge: HOME | End: 2021-01-13
Attending: PSYCHIATRY & NEUROLOGY
Payer: COMMERCIAL

## 2021-01-13 ENCOUNTER — ANESTHESIA (OUTPATIENT)
Dept: RADIOLOGY | Facility: HOSPITAL | Age: 46
End: 2021-01-13

## 2021-01-13 VITALS
HEART RATE: 64 BPM | SYSTOLIC BLOOD PRESSURE: 106 MMHG | OXYGEN SATURATION: 99 % | DIASTOLIC BLOOD PRESSURE: 60 MMHG | RESPIRATION RATE: 16 BRPM | TEMPERATURE: 97.8 F

## 2021-01-13 DIAGNOSIS — D35.2 PITUITARY MICROADENOMA (HCC): ICD-10-CM

## 2021-01-13 DIAGNOSIS — H53.2 DOUBLE VISION: ICD-10-CM

## 2021-01-13 LAB
EXT PREGNANCY TEST URINE: NEGATIVE
EXT. CONTROL: NORMAL

## 2021-01-13 PROCEDURE — A9585 GADOBUTROL INJECTION: HCPCS | Performed by: PSYCHIATRY & NEUROLOGY

## 2021-01-13 PROCEDURE — 81025 URINE PREGNANCY TEST: CPT | Performed by: ANESTHESIOLOGY

## 2021-01-13 PROCEDURE — 70553 MRI BRAIN STEM W/O & W/DYE: CPT

## 2021-01-13 PROCEDURE — G1004 CDSM NDSC: HCPCS

## 2021-01-13 RX ORDER — ONDANSETRON 2 MG/ML
INJECTION INTRAMUSCULAR; INTRAVENOUS AS NEEDED
Status: DISCONTINUED | OUTPATIENT
Start: 2021-01-13 | End: 2021-01-13

## 2021-01-13 RX ORDER — PROPOFOL 10 MG/ML
INJECTION, EMULSION INTRAVENOUS AS NEEDED
Status: DISCONTINUED | OUTPATIENT
Start: 2021-01-13 | End: 2021-01-13

## 2021-01-13 RX ORDER — LIDOCAINE HYDROCHLORIDE 10 MG/ML
0.5 INJECTION, SOLUTION EPIDURAL; INFILTRATION; INTRACAUDAL; PERINEURAL ONCE AS NEEDED
Status: COMPLETED | OUTPATIENT
Start: 2021-01-13 | End: 2021-01-13

## 2021-01-13 RX ORDER — LIDOCAINE HYDROCHLORIDE 10 MG/ML
INJECTION, SOLUTION EPIDURAL; INFILTRATION; INTRACAUDAL; PERINEURAL AS NEEDED
Status: DISCONTINUED | OUTPATIENT
Start: 2021-01-13 | End: 2021-01-13

## 2021-01-13 RX ORDER — SODIUM CHLORIDE, SODIUM LACTATE, POTASSIUM CHLORIDE, CALCIUM CHLORIDE 600; 310; 30; 20 MG/100ML; MG/100ML; MG/100ML; MG/100ML
125 INJECTION, SOLUTION INTRAVENOUS CONTINUOUS
Status: DISCONTINUED | OUTPATIENT
Start: 2021-01-13 | End: 2021-01-14 | Stop reason: HOSPADM

## 2021-01-13 RX ADMIN — SODIUM CHLORIDE, SODIUM LACTATE, POTASSIUM CHLORIDE, AND CALCIUM CHLORIDE 125 ML/HR: .6; .31; .03; .02 INJECTION, SOLUTION INTRAVENOUS at 13:19

## 2021-01-13 RX ADMIN — PROPOFOL 200 MG: 10 INJECTION, EMULSION INTRAVENOUS at 14:36

## 2021-01-13 RX ADMIN — GADOBUTROL 8 ML: 604.72 INJECTION INTRAVENOUS at 15:23

## 2021-01-13 RX ADMIN — LIDOCAINE HYDROCHLORIDE 50 MG: 10 INJECTION, SOLUTION EPIDURAL; INFILTRATION; INTRACAUDAL; PERINEURAL at 14:36

## 2021-01-13 RX ADMIN — ONDANSETRON 4 MG: 2 INJECTION INTRAMUSCULAR; INTRAVENOUS at 14:53

## 2021-01-13 RX ADMIN — LIDOCAINE HYDROCHLORIDE 0.2 ML: 10 INJECTION, SOLUTION EPIDURAL; INFILTRATION; INTRACAUDAL at 13:19

## 2021-01-13 NOTE — INTERVAL H&P NOTE
H&P reviewed  After examining the patient I find no changes in the patients condition since the H&P had been written      Vitals:    01/13/21 1256   BP: 109/69   Pulse: 73   Resp: 16   Temp: 98 6 °F (37 °C)

## 2021-01-13 NOTE — ANESTHESIA PREPROCEDURE EVALUATION
Procedure:  MRI BRAIN PITUITARY WO AND W CONTRAST  CLAUSTROPHOBIC  Relevant Problems   MUSCULOSKELETAL   (+) Lateral epicondylitis of left elbow      NEURO/PSYCH   (+) Double vision   (+) New daily persistent headache      Other   (+) 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 (RESOLVED)   (+) Pituitary microadenoma (HCC)   (+) Positive sm/RNP antibody        Physical Exam    Airway    Mallampati score: II  TM Distance: >3 FB  Neck ROM: full     Dental   No notable dental hx     Cardiovascular      Pulmonary      Other Findings        Anesthesia Plan  ASA Score- 2     Anesthesia Type- general with ASA Monitors  Additional Monitors:   Airway Plan:           Plan Factors-Exercise tolerance (METS): >4 METS  Patient is not a current smoker  Patient did not smoke on day of surgery  Induction- intravenous  Postoperative Plan-     Informed Consent- Anesthetic plan and risks discussed with patient  I personally reviewed this patient with the CRNA  Discussed and agreed on the Anesthesia Plan with the CRNA  Cathleen Gay

## 2021-01-13 NOTE — PROGRESS NOTES
Assessment/Plan   Assessment & Plan    Subjective   Subjective  Temp:  [96 9 °F (36 1 °C)-98 6 °F (37 °C)] 96 9 °F (36 1 °C)  HR:  [0-115] 75  Resp:  [16-20] 20  BP: (109-120)/(62-97) 110/63  No intake/output data recorded  I/O this shift:  In: 650 [I V :650]  Out: -     Objective   Objective  Active Problems:    * No active hospital problems  *    Pt awake and alert  VS stable  Pt has no questions at this time, pt and spouse notified of transfer to [facility], called [facility] spoke with [facility]

## 2021-01-13 NOTE — ANESTHESIA POSTPROCEDURE EVALUATION
Post-Op Assessment Note    CV Status:  Stable  Pain Score: 0    Pain management: adequate     Mental Status:  Arousable and sleepy   Hydration Status:  Stable   PONV Controlled:  None   Airway Patency:  Patent      Post Op Vitals Reviewed: Yes      Staff: Anesthesiologist         No complications documented      /97 (01/13/21 1531)    Temp (!) 96 9 °F (36 1 °C) (01/13/21 1531)    Pulse 85 (01/13/21 1531)   Resp 20 (01/13/21 1531)    SpO2 100 % (01/13/21 1531)

## 2021-01-26 ENCOUNTER — TELEPHONE (OUTPATIENT)
Dept: NEUROLOGY | Facility: CLINIC | Age: 46
End: 2021-01-26

## 2021-01-26 NOTE — TELEPHONE ENCOUNTER
Patient calling regarding her results from her MRI brain pituitary from 1/13/21  I see that you have reviewed this, please advise on what we should tell patient      QUINN    DY#[phone]

## 2021-01-26 NOTE — TELEPHONE ENCOUNTER
Patient has stable findings of pituitary microadenoma with no progression or worsening noted   We will follow radiology recommendations and repeat imaging in 6 months, No further recommendation

## 2021-04-28 ENCOUNTER — TELEMEDICINE (OUTPATIENT)
Dept: FAMILY MEDICINE CLINIC | Facility: CLINIC | Age: 46
End: 2021-04-28
Payer: COMMERCIAL

## 2021-04-28 VITALS — WEIGHT: 167 LBS | BODY MASS INDEX: 30.73 KG/M2 | HEIGHT: 62 IN

## 2021-04-28 DIAGNOSIS — R50.9 FEVER, UNSPECIFIED FEVER CAUSE: Primary | ICD-10-CM

## 2021-04-28 PROCEDURE — 99214 OFFICE O/P EST MOD 30 MIN: CPT | Performed by: INTERNAL MEDICINE

## 2021-04-28 PROCEDURE — 1036F TOBACCO NON-USER: CPT | Performed by: INTERNAL MEDICINE

## 2021-04-28 PROCEDURE — 3008F BODY MASS INDEX DOCD: CPT | Performed by: INTERNAL MEDICINE

## 2021-04-28 RX ORDER — AZITHROMYCIN 250 MG/1
TABLET, FILM COATED ORAL
Qty: 6 TABLET | Refills: 0 | Status: SHIPPED | OUTPATIENT
Start: 2021-04-28 | End: 2021-05-02

## 2021-04-28 RX ORDER — ALBUTEROL SULFATE 90 UG/1
2 AEROSOL, METERED RESPIRATORY (INHALATION) EVERY 6 HOURS PRN
Qty: 1 INHALER | Refills: 5 | Status: SHIPPED | OUTPATIENT
Start: 2021-04-28 | End: 2021-10-07

## 2021-04-28 NOTE — PROGRESS NOTES
COVID-19 Outpatient Progress Note    Assessment/Plan:    Problem List Items Addressed This Visit     None      Visit Diagnoses     Fever, unspecified fever cause    -  Primary    Had rapid covid done and was negative-recommend retesting tommorrow-will rx claritind,albuterol, and send zpack to take if worse/more bacterial in nature    Relevant Medications    albuterol (PROVENTIL HFA,VENTOLIN HFA) 90 mcg/act inhaler    loratadine-pseudoephedrine (CLARITIN-D 12-HOUR) 5-120 mg per tablet    azithromycin (ZITHROMAX) 250 mg tablet    Other Relevant Orders    Multiplex COVID19, Influenza A/B, RSV PCR, [facility] - Collected at [place] [address] or [facility]         Disposition:     I recommended self-quarantine for 10 days and to watch for symptoms until 14 days after exposure  If patient were to develop symptoms, they should self isolate and call our office for further guidance  I referred patient to one of our centralized sites for a COVID-19 swab  I have spent 20 minutes directly with the patient  Greater than 50% of this time was spent in counseling/coordination of care regarding: diagnostic results, instructions for management and patient and family education  Encounter provider Sofia Vick MD    Provider located at [address] [place] [id number] [phone]    Recent Visits  No visits were found meeting these conditions  Showing recent visits within past 7 days and meeting all other requirements     Today's Visits  Date Type Provider Dept   04/28/21 Telemedicine Al Mariscal MD [address] today's visits and meeting all other requirements     Future Appointments  No visits were found meeting these conditions     Showing future appointments within next 150 days and meeting all other requirements      This virtual check-in was done via [employer] and patient was informed that this is not a secure, HIPAA-compliant platform  She agrees to proceed  Patient agrees to participate in a virtual check in via telephone or video visit instead of presenting to the office to address urgent/immediate medical needs  Patient is aware this is a billable service  After connecting through [facility], the patient was identified by name and date of birth  Zan Anguiano was informed that this was a telemedicine visit and that the exam was being conducted confidentially over secure lines  Zan Anguiano acknowledged consent and understanding of privacy and security of the telemedicine visit  I informed the patient that I have reviewed her record in Epic and presented the opportunity for her to ask any questions regarding the visit today  The patient agreed to participate  Subjective:   Zan Anguiano is a 48 y o  female who is concerned about COVID-19  Patient's symptoms include fever, chills, fatigue, malaise, nasal congestion, cough, chest tightness and headache  Patient denies anosmia, loss of taste, abdominal pain, nausea, vomiting and diarrhea       Date of symptom onset: 4/25/2021    Exposure:   Contact with a person who is under investigation (PUI) for or who is positive for COVID-19 within the last 14 days?: No    Hospitalized recently for fever and/or lower respiratory symptoms?: No      Currently a healthcare worker that is involved in direct patient care?: No      Works in a special setting where the risk of COVID-19 transmission may be high? (this may include long-term care, correctional and prison facilities; homeless shelters; assisted-living facilities and group homes ): No      Resident in a special setting where the risk of COVID-19 transmission may be high? (this may include long-term care, correctional and prison facilities; homeless shelters; assisted-living facilities and group homes ): No      No results found for: [address] [facility], [address] [facility], [address] [facility], [place] [address]  Past Medical History:   Diagnosis Date    Allergies bactrim, IV contrast dye    Enlarged pituitary gland (HCC)     Shoulder pain, right 2019     Past Surgical History:   Procedure Laterality Date     SECTION      CHOLECYSTECTOMY  2016    COLONOSCOPY  2019    COLONOSCOPY  2016    GALLBLADDER SURGERY      KNEE SURGERY      TUBAL LIGATION       Current Outpatient Medications   Medication Sig Dispense Refill    Cyanocobalamin (B-12 PO) Take by mouth daily       MAGNESIUM PO daily       MELATONIN PO Take by mouth daily at bedtime       Riboflavin (B2 PO) Take by mouth daily       albuterol (PROVENTIL HFA,VENTOLIN HFA) 90 mcg/act inhaler Inhale 2 puffs every 6 (six) hours as needed for wheezing or shortness of breath 1 Inhaler 5    azithromycin (ZITHROMAX) 250 mg tablet Take 2 tablets today then 1 tablet daily x 4 days 6 tablet 0    loratadine-pseudoephedrine (CLARITIN-D 12-HOUR) 5-120 mg per tablet Take 1 tablet by mouth daily 40 tablet 3     No current facility-administered medications for this visit  Allergies   Allergen Reactions    Bactrim [Sulfamethoxazole-Trimethoprim] Other (See Comments)     Suicidal    Contrast Dye [Iodinated Diagnostic Agents] Rash     Pt denies having an allergy to this and to shellfish/seafood        Review of Systems   Constitutional: Positive for chills, fatigue and fever  HENT: Positive for congestion  Respiratory: Positive for cough and chest tightness  Gastrointestinal: Negative for abdominal pain, diarrhea, nausea and vomiting  Neurological: Positive for headaches  Objective:    Vitals:    21 0917   Weight: 75 8 kg (167 lb)   Height: 5' 2" (1 575 m)       Physical Exam  Vitals signs reviewed  Constitutional:       Appearance: She is well-developed  HENT:      Head: Normocephalic and atraumatic  Neck:      Musculoskeletal: Normal range of motion  Pulmonary:      Effort: Pulmonary effort is normal    Neurological:      General: No focal deficit present  Mental Status: She is alert  Psychiatric:         Mood and Affect: Mood normal          Behavior: Behavior normal        VIRTUAL VISIT DISCLAIMER    Rigo Reza acknowledges that she has consented to an online visit or consultation  She understands that the online visit is based solely on information provided by her, and that, in the absence of a face-to-face physical evaluation by the physician, the diagnosis she receives is both limited and provisional in terms of accuracy and completeness  This is not intended to replace a full medical face-to-face evaluation by the physician  Rigo Rzea understands and accepts these terms

## 2021-04-29 DIAGNOSIS — R50.9 FEVER, UNSPECIFIED FEVER CAUSE: ICD-10-CM

## 2021-04-29 LAB
FLUAV RNA RESP QL NAA+PROBE: NEGATIVE
FLUBV RNA RESP QL NAA+PROBE: NEGATIVE
RSV RNA RESP QL NAA+PROBE: NEGATIVE
SARS-COV-2 RNA RESP QL NAA+PROBE: NEGATIVE

## 2021-04-29 PROCEDURE — 0241U HB NFCT DS VIR RESP RNA 4 TRGT: CPT | Performed by: INTERNAL MEDICINE

## 2021-09-18 ENCOUNTER — IMMUNIZATIONS (OUTPATIENT)
Dept: FAMILY MEDICINE CLINIC | Facility: HOSPITAL | Age: 46
End: 2021-09-18

## 2021-09-18 DIAGNOSIS — Z23 ENCOUNTER FOR IMMUNIZATION: Primary | ICD-10-CM

## 2021-09-18 PROCEDURE — 0001A SARS-COV-2 / COVID-19 MRNA VACCINE (PFIZER-BIONTECH) 30 MCG: CPT

## 2021-09-18 PROCEDURE — 91300 SARS-COV-2 / COVID-19 MRNA VACCINE (PFIZER-BIONTECH) 30 MCG: CPT

## 2021-10-07 ENCOUNTER — OFFICE VISIT (OUTPATIENT)
Dept: FAMILY MEDICINE CLINIC | Facility: CLINIC | Age: 46
End: 2021-10-07
Payer: COMMERCIAL

## 2021-10-07 VITALS
RESPIRATION RATE: 14 BRPM | DIASTOLIC BLOOD PRESSURE: 80 MMHG | TEMPERATURE: 97.9 F | OXYGEN SATURATION: 98 % | WEIGHT: 173 LBS | SYSTOLIC BLOOD PRESSURE: 118 MMHG | HEART RATE: 78 BPM | BODY MASS INDEX: 31.83 KG/M2 | HEIGHT: 62 IN

## 2021-10-07 DIAGNOSIS — J02.9 SORE THROAT: Primary | ICD-10-CM

## 2021-10-07 DIAGNOSIS — J06.9 VIRAL URI WITH COUGH: ICD-10-CM

## 2021-10-07 LAB — S PYO AG THROAT QL: NEGATIVE

## 2021-10-07 PROCEDURE — 3008F BODY MASS INDEX DOCD: CPT | Performed by: FAMILY MEDICINE

## 2021-10-07 PROCEDURE — 1036F TOBACCO NON-USER: CPT | Performed by: FAMILY MEDICINE

## 2021-10-07 PROCEDURE — U0003 INFECTIOUS AGENT DETECTION BY NUCLEIC ACID (DNA OR RNA); SEVERE ACUTE RESPIRATORY SYNDROME CORONAVIRUS 2 (SARS-COV-2) (CORONAVIRUS DISEASE [COVID-19]), AMPLIFIED PROBE TECHNIQUE, MAKING USE OF HIGH THROUGHPUT TECHNOLOGIES AS DESCRIBED BY CMS-2020-01-R: HCPCS | Performed by: FAMILY MEDICINE

## 2021-10-07 PROCEDURE — U0005 INFEC AGEN DETEC AMPLI PROBE: HCPCS | Performed by: FAMILY MEDICINE

## 2021-10-07 PROCEDURE — 87880 STREP A ASSAY W/OPTIC: CPT | Performed by: FAMILY MEDICINE

## 2021-10-07 PROCEDURE — 99213 OFFICE O/P EST LOW 20 MIN: CPT | Performed by: FAMILY MEDICINE

## 2021-10-08 LAB — SARS-COV-2 RNA RESP QL NAA+PROBE: NEGATIVE

## 2021-10-09 ENCOUNTER — IMMUNIZATIONS (OUTPATIENT)
Dept: FAMILY MEDICINE CLINIC | Facility: HOSPITAL | Age: 46
End: 2021-10-09

## 2021-10-09 DIAGNOSIS — Z23 ENCOUNTER FOR IMMUNIZATION: Primary | ICD-10-CM

## 2021-10-09 PROCEDURE — 0002A SARS-COV-2 / COVID-19 MRNA VACCINE (PFIZER-BIONTECH) 30 MCG: CPT

## 2021-10-09 PROCEDURE — 91300 SARS-COV-2 / COVID-19 MRNA VACCINE (PFIZER-BIONTECH) 30 MCG: CPT

## 2021-12-07 ENCOUNTER — HOSPITAL ENCOUNTER (OUTPATIENT)
Dept: RADIOLOGY | Facility: HOSPITAL | Age: 46
Discharge: HOME | End: 2021-12-07
Attending: FAMILY MEDICINE
Payer: COMMERCIAL

## 2021-12-07 ENCOUNTER — OFFICE VISIT (OUTPATIENT)
Dept: FAMILY MEDICINE CLINIC | Facility: CLINIC | Age: 46
End: 2021-12-07
Payer: COMMERCIAL

## 2021-12-07 VITALS
WEIGHT: 172 LBS | OXYGEN SATURATION: 98 % | DIASTOLIC BLOOD PRESSURE: 78 MMHG | SYSTOLIC BLOOD PRESSURE: 122 MMHG | HEIGHT: 62 IN | TEMPERATURE: 97.9 F | RESPIRATION RATE: 16 BRPM | BODY MASS INDEX: 31.65 KG/M2 | HEART RATE: 74 BPM

## 2021-12-07 DIAGNOSIS — M79.671 FOOT PAIN, BILATERAL: Primary | ICD-10-CM

## 2021-12-07 DIAGNOSIS — M79.671 FOOT PAIN, BILATERAL: ICD-10-CM

## 2021-12-07 DIAGNOSIS — M79.672 FOOT PAIN, BILATERAL: ICD-10-CM

## 2021-12-07 DIAGNOSIS — G89.29 CHRONIC FOOT PAIN, LEFT: ICD-10-CM

## 2021-12-07 DIAGNOSIS — M79.672 CHRONIC FOOT PAIN, LEFT: ICD-10-CM

## 2021-12-07 DIAGNOSIS — M79.671 ACUTE PAIN OF RIGHT FOOT: ICD-10-CM

## 2021-12-07 DIAGNOSIS — M79.672 FOOT PAIN, BILATERAL: Primary | ICD-10-CM

## 2021-12-07 PROCEDURE — 1036F TOBACCO NON-USER: CPT | Performed by: FAMILY MEDICINE

## 2021-12-07 PROCEDURE — 73630 X-RAY EXAM OF FOOT: CPT

## 2021-12-07 PROCEDURE — 3008F BODY MASS INDEX DOCD: CPT | Performed by: FAMILY MEDICINE

## 2021-12-07 PROCEDURE — 3725F SCREEN DEPRESSION PERFORMED: CPT | Performed by: FAMILY MEDICINE

## 2021-12-07 PROCEDURE — 99214 OFFICE O/P EST MOD 30 MIN: CPT | Performed by: FAMILY MEDICINE

## 2021-12-07 RX ORDER — TIZANIDINE 2 MG/1
2 TABLET ORAL EVERY 8 HOURS PRN
Qty: 20 TABLET | Refills: 0 | Status: SHIPPED | OUTPATIENT
Start: 2021-12-07 | End: 2022-03-10

## 2021-12-07 RX ORDER — MELOXICAM 15 MG/1
15 TABLET ORAL DAILY
Qty: 20 TABLET | Refills: 0 | Status: SHIPPED | OUTPATIENT
Start: 2021-12-07 | End: 2022-03-10

## 2021-12-10 ENCOUNTER — TELEPHONE (OUTPATIENT)
Dept: FAMILY MEDICINE CLINIC | Facility: CLINIC | Age: 46
End: 2021-12-10

## 2021-12-10 PROCEDURE — U0003 INFECTIOUS AGENT DETECTION BY NUCLEIC ACID (DNA OR RNA); SEVERE ACUTE RESPIRATORY SYNDROME CORONAVIRUS 2 (SARS-COV-2) (CORONAVIRUS DISEASE [COVID-19]), AMPLIFIED PROBE TECHNIQUE, MAKING USE OF HIGH THROUGHPUT TECHNOLOGIES AS DESCRIBED BY CMS-2020-01-R: HCPCS | Performed by: INTERNAL MEDICINE

## 2021-12-10 PROCEDURE — U0005 INFEC AGEN DETEC AMPLI PROBE: HCPCS | Performed by: INTERNAL MEDICINE

## 2021-12-25 ENCOUNTER — HOSPITAL ENCOUNTER (EMERGENCY)
Facility: HOSPITAL | Age: 46
Discharge: HOME | End: 2021-12-25
Attending: EMERGENCY MEDICINE
Payer: COMMERCIAL

## 2021-12-25 VITALS
DIASTOLIC BLOOD PRESSURE: 115 MMHG | SYSTOLIC BLOOD PRESSURE: 172 MMHG | TEMPERATURE: 99.5 F | HEART RATE: 97 BPM | OXYGEN SATURATION: 97 % | RESPIRATION RATE: 20 BRPM

## 2021-12-25 DIAGNOSIS — R22.0 MILD TONGUE SWELLING: Primary | ICD-10-CM

## 2021-12-25 LAB
FLUAV RNA RESP QL NAA+PROBE: NEGATIVE
FLUBV RNA RESP QL NAA+PROBE: NEGATIVE
RSV RNA RESP QL NAA+PROBE: NEGATIVE
SARS-COV-2 RNA RESP QL NAA+PROBE: POSITIVE

## 2021-12-25 PROCEDURE — 99284 EMERGENCY DEPT VISIT MOD MDM: CPT | Performed by: EMERGENCY MEDICINE

## 2021-12-25 PROCEDURE — 0241U HB NFCT DS VIR RESP RNA 4 TRGT: CPT | Performed by: EMERGENCY MEDICINE

## 2021-12-25 PROCEDURE — 99283 EMERGENCY DEPT VISIT LOW MDM: CPT

## 2021-12-25 RX ORDER — PREDNISONE 20 MG/1
60 TABLET ORAL ONCE
Status: COMPLETED | OUTPATIENT
Start: 2021-12-25 | End: 2021-12-25

## 2021-12-25 RX ORDER — FAMOTIDINE 20 MG/1
20 TABLET, FILM COATED ORAL ONCE
Status: COMPLETED | OUTPATIENT
Start: 2021-12-25 | End: 2021-12-25

## 2021-12-25 RX ORDER — PREDNISONE 20 MG/1
40 TABLET ORAL DAILY
Qty: 6 TABLET | Refills: 0 | Status: SHIPPED | OUTPATIENT
Start: 2021-12-25 | End: 2021-12-28

## 2021-12-25 RX ADMIN — FAMOTIDINE 20 MG: 20 TABLET ORAL at 19:54

## 2021-12-25 RX ADMIN — PREDNISONE 60 MG: 20 TABLET ORAL at 19:54

## 2021-12-30 ENCOUNTER — TELEPHONE (OUTPATIENT)
Dept: FAMILY MEDICINE CLINIC | Facility: CLINIC | Age: 46
End: 2021-12-30

## 2021-12-30 ENCOUNTER — TELEMEDICINE (OUTPATIENT)
Dept: FAMILY MEDICINE CLINIC | Facility: CLINIC | Age: 46
End: 2021-12-30
Payer: COMMERCIAL

## 2021-12-30 DIAGNOSIS — R30.0 DYSURIA: ICD-10-CM

## 2021-12-30 DIAGNOSIS — U07.1 COVID-19: Primary | ICD-10-CM

## 2021-12-30 DIAGNOSIS — E86.0 DEHYDRATION: ICD-10-CM

## 2021-12-30 PROCEDURE — 99213 OFFICE O/P EST LOW 20 MIN: CPT | Performed by: FAMILY MEDICINE

## 2021-12-30 RX ORDER — PHENAZOPYRIDINE HYDROCHLORIDE 100 MG/1
100 TABLET, FILM COATED ORAL 2 TIMES DAILY PRN
Qty: 10 TABLET | Refills: 0 | Status: SHIPPED | OUTPATIENT
Start: 2021-12-30 | End: 2022-03-10

## 2022-01-24 ENCOUNTER — OFFICE VISIT (OUTPATIENT)
Dept: FAMILY MEDICINE CLINIC | Facility: CLINIC | Age: 47
End: 2022-01-24
Payer: COMMERCIAL

## 2022-01-24 VITALS
HEART RATE: 80 BPM | TEMPERATURE: 97.2 F | HEIGHT: 62 IN | BODY MASS INDEX: 31.1 KG/M2 | DIASTOLIC BLOOD PRESSURE: 80 MMHG | WEIGHT: 169 LBS | SYSTOLIC BLOOD PRESSURE: 118 MMHG | OXYGEN SATURATION: 98 % | RESPIRATION RATE: 16 BRPM

## 2022-01-24 DIAGNOSIS — Z23 NEED FOR TDAP VACCINATION: ICD-10-CM

## 2022-01-24 DIAGNOSIS — D35.2 PITUITARY MICROADENOMA (HCC): ICD-10-CM

## 2022-01-24 DIAGNOSIS — Z12.4 CERVICAL CANCER SCREENING: ICD-10-CM

## 2022-01-24 DIAGNOSIS — Z80.7 FAMILY HISTORY OF MULTIPLE MYELOMA: ICD-10-CM

## 2022-01-24 DIAGNOSIS — Z28.21 INFLUENZA VACCINATION DECLINED: ICD-10-CM

## 2022-01-24 DIAGNOSIS — Z12.31 ENCOUNTER FOR SCREENING MAMMOGRAM FOR MALIGNANT NEOPLASM OF BREAST: ICD-10-CM

## 2022-01-24 DIAGNOSIS — Z00.00 ANNUAL PHYSICAL EXAM: Primary | ICD-10-CM

## 2022-01-24 PROCEDURE — 90715 TDAP VACCINE 7 YRS/> IM: CPT | Performed by: INTERNAL MEDICINE

## 2022-01-24 PROCEDURE — 3008F BODY MASS INDEX DOCD: CPT | Performed by: INTERNAL MEDICINE

## 2022-01-24 PROCEDURE — 90471 IMMUNIZATION ADMIN: CPT | Performed by: INTERNAL MEDICINE

## 2022-01-24 PROCEDURE — 1036F TOBACCO NON-USER: CPT | Performed by: INTERNAL MEDICINE

## 2022-01-24 PROCEDURE — 99396 PREV VISIT EST AGE 40-64: CPT | Performed by: INTERNAL MEDICINE

## 2022-01-24 NOTE — PATIENT INSTRUCTIONS

## 2022-01-24 NOTE — ASSESSMENT & PLAN NOTE
We went over last MRI done in Jan 2021-adenoma is small 5X3 mm-dave denies any symptoms such as frequent headaches, blurred or double visioin, or galactorrhea-will hold off for right now on repeat imaging

## 2022-01-24 NOTE — PROGRESS NOTES
[address] [facility] FAMILY MEDICINE    NAME: Sam Cheatham  AGE: 50 y o  SEX: female  : 1975     DATE: 2022     Assessment and Plan:     Problem List Items Addressed This Visit        Endocrine    Pituitary microadenoma [facility])     We went over last MRI done in 2021-adenoma is small 5X3 mm-dave denies any symptoms such as frequent headaches, blurred or double visioin, or galactorrhea-will hold off for right now on repeat imaging           Other Visit Diagnoses     Annual physical exam    -  Primary    Relevant Orders    CBC and differential    Lipid panel    TSH, 3rd generation    Comprehensive metabolic panel    Cervical cancer screening        Relevant Orders    Ambulatory Referral to Gynecology    Encounter for screening mammogram for malignant neoplasm of breast        Relevant Orders    Mammo screening bilateral w 3d & cad    BMI 30 0-30 9,adult        Relevant Orders    CBC and differential    Lipid panel    TSH, 3rd generation    Comprehensive metabolic panel    Need for Tdap vaccination        Relevant Orders    TDAP VACCINE GREATER THAN OR EQUAL TO 8YO IM    Family history of multiple myeloma        Relevant Orders    Protein electrophoresis, serum    Protein electrophoresis, urine          Immunizations and preventive care screenings were discussed with patient today  Appropriate education was printed on patient's after visit summary  Counseling:  Alcohol/drug use: discussed moderation in alcohol intake, the recommendations for healthy alcohol use, and avoidance of illicit drug use  Dental Health: discussed importance of regular tooth brushing, flossing, and dental visits  · Exercise: the importance of regular exercise/physical activity was discussed  Recommend exercise 3-5 times per week for at least 30 minutes  BMI Counseling: Body mass index is 30 91 kg/m²   The BMI is above normal  Nutrition recommendations include consuming healthier snacks, moderation in carbohydrate intake and increasing intake of lean protein  Exercise recommendations include moderate physical activity 150 minutes/week  No pharmacotherapy was ordered  Patient referred to nutritionist  Rationale for BMI follow-up plan is due to patient being overweight or obese  Return in about 1 year (around 2023) for Annual physical      Chief Complaint:     Chief Complaint   Patient presents with    Physical Exam      History of Present Illness:     Adult Annual Physical   Patient here for a comprehensive physical exam  The patient reports no problems  Diet and Physical Activity  · Diet/Nutrition: well balanced diet  · Exercise: no formal exercise  Depression Screening  PHQ-2/9 Depression Screening         General Health  · Sleep: sleeps poorly  · Hearing: normal - bilateral   · Vision: no vision problems  · Dental: regular dental visits  /GYN Health  · Patient is: perimenopausal  · Last menstrual period: monthly  · Contraceptive method: n/a  Review of Systems:     Review of Systems   Constitutional: Negative  Eyes: Negative  Respiratory: Negative  Cardiovascular: Negative  Gastrointestinal: Negative  Genitourinary: Negative  Musculoskeletal: Negative  Neurological: Negative  Hematological: Negative  Psychiatric/Behavioral: Negative         Past Medical History:     Past Medical History:   Diagnosis Date    Alcohol intoxication ([facility] [address] ) 2021    ED visit    Allergies     bactrim, IV contrast dye    Enlarged pituitary gland (HCC)     Shoulder pain, right 2019      Past Surgical History:     Past Surgical History:   Procedure Laterality Date     SECTION      x 1    CHOLECYSTECTOMY  2016    COLONOSCOPY  2019    COLONOSCOPY  2016    GALLBLADDER SURGERY      KNEE SURGERY      TUBAL LIGATION        Social History:     Social History     Socioeconomic History    Marital status: /Civil Union     Spouse name: None    Number of children: None    Years of education: None    Highest education level: None   Occupational History    None   Tobacco Use    Smoking status: Never Smoker    Smokeless tobacco: Never Used   Vaping Use    Vaping Use: Never used   Substance and Sexual Activity    Alcohol use:  Yes    Drug use: No    Sexual activity: None   Other Topics Concern    None   Social History Narrative    · Occupation:   house wife         Per [employer]      Social Determinants of Health     Financial Resource Strain: Not on file   Food Insecurity: Not on file   Transportation Needs: Not on file   Physical Activity: Not on file   Stress: Not on file   Social Connections: Not on file   Intimate Partner Violence: Not on file   Housing Stability: Not on file      Family History:     Family History   Problem Relation Age of Onset    Rheum arthritis Mother     Lupus Mother     Cancer Father     Heart disease Father         bypass    Melanoma Father     Liver cancer Maternal Grandmother     No Known Problems Sister     No Known Problems Brother     No Known Problems Son     No Known Problems Daughter     No Known Problems Sister     No Known Problems Daughter     No Known Problems Daughter     No Known Problems Daughter       Current Medications:     Current Outpatient Medications   Medication Sig Dispense Refill    meloxicam (MOBIC) 15 mg tablet Take 1 tablet (15 mg total) by mouth daily (Patient not taking: Reported on 1/24/2022 ) 20 tablet 0    phenazopyridine (PYRIDIUM) 100 mg tablet Take 1 tablet (100 mg total) by mouth 2 (two) times a day as needed for bladder spasms (Patient not taking: Reported on 1/24/2022 ) 10 tablet 0    tiZANidine (ZANAFLEX) 2 mg tablet Take 1 tablet (2 mg total) by mouth every 8 (eight) hours as needed for muscle spasms (Patient not taking: Reported on 1/24/2022 ) 20 tablet 0     No current facility-administered medications for this visit  Allergies: Allergies   Allergen Reactions    Bactrim [Sulfamethoxazole-Trimethoprim] Other (See Comments)     Suicidal    Contrast Dye [Iodinated Diagnostic Agents] Rash     Pt denies having an allergy to this and to shellfish/seafood       Physical Exam:     /80 (BP Location: Left arm, Patient Position: Sitting, Cuff Size: Adult)   Pulse 80   Temp (!) 97 2 °F (36 2 °C) (Temporal)   Resp 16   Ht 5' 2" (1 575 m)   Wt 76 7 kg (169 lb)   SpO2 98%   BMI 30 91 kg/m²     Physical Exam  Constitutional:       Appearance: Normal appearance  HENT:      Head: Normocephalic and atraumatic  Right Ear: External ear normal       Left Ear: External ear normal       Nose: Nose normal       Mouth/Throat:      Mouth: Mucous membranes are moist    Eyes:      Extraocular Movements: Extraocular movements intact  Conjunctiva/sclera: Conjunctivae normal       Pupils: Pupils are equal, round, and reactive to light  Cardiovascular:      Rate and Rhythm: Normal rate and regular rhythm  Heart sounds: Normal heart sounds  Pulmonary:      Effort: Pulmonary effort is normal       Breath sounds: Normal breath sounds  Abdominal:      General: Abdomen is flat  Palpations: Abdomen is soft  Hernia: No hernia is present  Musculoskeletal:         General: Normal range of motion  Cervical back: Normal range of motion and neck supple  Skin:     General: Skin is warm  Capillary Refill: Capillary refill takes less than 2 seconds  Neurological:      General: No focal deficit present  Mental Status: She is alert and oriented to person, place, and time  Mental status is at baseline  Psychiatric:         Mood and Affect: Mood normal          Behavior: Behavior normal          Thought Content:  Thought content normal          Judgment: Judgment normal           Gregg Painter MD  [facility] MEDICINE

## 2022-01-25 ENCOUNTER — APPOINTMENT (OUTPATIENT)
Dept: LAB | Facility: CLINIC | Age: 47
End: 2022-01-25
Payer: COMMERCIAL

## 2022-01-25 DIAGNOSIS — Z80.7 FAMILY HISTORY OF MULTIPLE MYELOMA: ICD-10-CM

## 2022-01-25 DIAGNOSIS — Z00.00 ANNUAL PHYSICAL EXAM: ICD-10-CM

## 2022-01-25 DIAGNOSIS — M19.272: Primary | ICD-10-CM

## 2022-01-25 LAB
ALBUMIN SERPL BCP-MCNC: 3.6 G/DL (ref 3.5–5)
ALP SERPL-CCNC: 72 U/L (ref 46–116)
ALT SERPL W P-5'-P-CCNC: 21 U/L (ref 12–78)
ANION GAP SERPL CALCULATED.3IONS-SCNC: 4 MMOL/L (ref 4–13)
AST SERPL W P-5'-P-CCNC: 13 U/L (ref 5–45)
BASOPHILS # BLD AUTO: 0.05 THOUSANDS/ΜL (ref 0–0.1)
BASOPHILS NFR BLD AUTO: 1 % (ref 0–1)
BILIRUB SERPL-MCNC: 1.16 MG/DL (ref 0.2–1)
BUN SERPL-MCNC: 9 MG/DL (ref 5–25)
CALCIUM SERPL-MCNC: 9.9 MG/DL (ref 8.3–10.1)
CHLORIDE SERPL-SCNC: 106 MMOL/L (ref 100–108)
CHOLEST SERPL-MCNC: 258 MG/DL
CO2 SERPL-SCNC: 28 MMOL/L (ref 21–32)
CREAT SERPL-MCNC: 0.85 MG/DL (ref 0.6–1.3)
EOSINOPHIL # BLD AUTO: 0.11 THOUSAND/ΜL (ref 0–0.61)
EOSINOPHIL NFR BLD AUTO: 2 % (ref 0–6)
ERYTHROCYTE [DISTWIDTH] IN BLOOD BY AUTOMATED COUNT: 13.2 % (ref 11.6–15.1)
GFR SERPL CREATININE-BSD FRML MDRD: 80 ML/MIN/1.73SQ M
GLUCOSE P FAST SERPL-MCNC: 94 MG/DL (ref 65–99)
HCT VFR BLD AUTO: 40 % (ref 34.8–46.1)
HDLC SERPL-MCNC: 64 MG/DL
HGB BLD-MCNC: 12.2 G/DL (ref 11.5–15.4)
IMM GRANULOCYTES # BLD AUTO: 0.01 THOUSAND/UL (ref 0–0.2)
IMM GRANULOCYTES NFR BLD AUTO: 0 % (ref 0–2)
LDLC SERPL CALC-MCNC: 161 MG/DL (ref 0–100)
LYMPHOCYTES # BLD AUTO: 2.49 THOUSANDS/ΜL (ref 0.6–4.47)
LYMPHOCYTES NFR BLD AUTO: 40 % (ref 14–44)
MCH RBC QN AUTO: 27.2 PG (ref 26.8–34.3)
MCHC RBC AUTO-ENTMCNC: 30.5 G/DL (ref 31.4–37.4)
MCV RBC AUTO: 89 FL (ref 82–98)
MONOCYTES # BLD AUTO: 0.39 THOUSAND/ΜL (ref 0.17–1.22)
MONOCYTES NFR BLD AUTO: 6 % (ref 4–12)
NEUTROPHILS # BLD AUTO: 3.12 THOUSANDS/ΜL (ref 1.85–7.62)
NEUTS SEG NFR BLD AUTO: 51 % (ref 43–75)
NONHDLC SERPL-MCNC: 194 MG/DL
NRBC BLD AUTO-RTO: 0 /100 WBCS
PLATELET # BLD AUTO: 339 THOUSANDS/UL (ref 149–390)
PMV BLD AUTO: 9.8 FL (ref 8.9–12.7)
POTASSIUM SERPL-SCNC: 4.3 MMOL/L (ref 3.5–5.3)
PROT SERPL-MCNC: 8.5 G/DL (ref 6.4–8.2)
RBC # BLD AUTO: 4.49 MILLION/UL (ref 3.81–5.12)
SODIUM SERPL-SCNC: 138 MMOL/L (ref 136–145)
TRIGL SERPL-MCNC: 165 MG/DL
TSH SERPL DL<=0.05 MIU/L-ACNC: 1.77 UIU/ML (ref 0.36–3.74)
WBC # BLD AUTO: 6.17 THOUSAND/UL (ref 4.31–10.16)

## 2022-01-25 PROCEDURE — 80061 LIPID PANEL: CPT

## 2022-01-25 PROCEDURE — 36415 COLL VENOUS BLD VENIPUNCTURE: CPT

## 2022-01-25 PROCEDURE — 85025 COMPLETE CBC W/AUTO DIFF WBC: CPT

## 2022-01-25 PROCEDURE — 86334 IMMUNOFIX E-PHORESIS SERUM: CPT

## 2022-01-25 PROCEDURE — 84166 PROTEIN E-PHORESIS/URINE/CSF: CPT | Performed by: PATHOLOGY

## 2022-01-25 PROCEDURE — 84443 ASSAY THYROID STIM HORMONE: CPT

## 2022-01-25 PROCEDURE — 84165 PROTEIN E-PHORESIS SERUM: CPT

## 2022-01-25 PROCEDURE — 86334 IMMUNOFIX E-PHORESIS SERUM: CPT | Performed by: PATHOLOGY

## 2022-01-25 PROCEDURE — 80053 COMPREHEN METABOLIC PANEL: CPT

## 2022-01-25 PROCEDURE — 84165 PROTEIN E-PHORESIS SERUM: CPT | Performed by: PATHOLOGY

## 2022-01-25 PROCEDURE — 84166 PROTEIN E-PHORESIS/URINE/CSF: CPT | Performed by: INTERNAL MEDICINE

## 2022-01-26 LAB
ALBUMIN SERPL ELPH-MCNC: 4.2 G/DL (ref 3.5–5)
ALBUMIN SERPL ELPH-MCNC: 51.2 % (ref 52–65)
ALBUMIN UR ELPH-MCNC: 100 %
ALPHA1 GLOB MFR UR ELPH: 0 %
ALPHA1 GLOB SERPL ELPH-MCNC: 0.33 G/DL (ref 0.1–0.4)
ALPHA1 GLOB SERPL ELPH-MCNC: 4 % (ref 2.5–5)
ALPHA2 GLOB MFR UR ELPH: 0 %
ALPHA2 GLOB SERPL ELPH-MCNC: 0.95 G/DL (ref 0.4–1.2)
ALPHA2 GLOB SERPL ELPH-MCNC: 11.6 % (ref 7–13)
B-GLOBULIN MFR UR ELPH: 0 %
BETA GLOB ABNORMAL SERPL ELPH-MCNC: 0.47 G/DL (ref 0.4–0.8)
BETA1 GLOB SERPL ELPH-MCNC: 5.7 % (ref 5–13)
BETA2 GLOB SERPL ELPH-MCNC: 4.4 % (ref 2–8)
BETA2+GAMMA GLOB SERPL ELPH-MCNC: 0.36 G/DL (ref 0.2–0.5)
GAMMA GLOB ABNORMAL SERPL ELPH-MCNC: 1.89 G/DL (ref 0.5–1.6)
GAMMA GLOB MFR UR ELPH: 0 %
GAMMA GLOB SERPL ELPH-MCNC: 23.1 % (ref 12–22)
IGG/ALB SER: 1.05 {RATIO} (ref 1.1–1.8)
INTERPRETATION UR IFE-IMP: NORMAL
PROT PATTERN SERPL ELPH-IMP: ABNORMAL
PROT PATTERN UR ELPH-IMP: NORMAL
PROT SERPL-MCNC: 8.2 G/DL (ref 6.4–8.2)
PROT UR-MCNC: 7 MG/DL

## 2022-02-17 ENCOUNTER — HOSPITAL ENCOUNTER (OUTPATIENT)
Dept: RADIOLOGY | Facility: HOSPITAL | Age: 47
Discharge: HOME | End: 2022-02-17
Payer: COMMERCIAL

## 2022-02-17 DIAGNOSIS — Z12.31 ENCOUNTER FOR SCREENING MAMMOGRAM FOR MALIGNANT NEOPLASM OF BREAST: ICD-10-CM

## 2022-02-17 PROCEDURE — 77067 SCR MAMMO BI INCL CAD: CPT

## 2022-02-17 PROCEDURE — 77063 BREAST TOMOSYNTHESIS BI: CPT

## 2022-03-10 ENCOUNTER — OFFICE VISIT (OUTPATIENT)
Dept: OBGYN CLINIC | Facility: CLINIC | Age: 47
End: 2022-03-10
Payer: COMMERCIAL

## 2022-03-10 VITALS
BODY MASS INDEX: 32.39 KG/M2 | WEIGHT: 176 LBS | HEIGHT: 62 IN | SYSTOLIC BLOOD PRESSURE: 108 MMHG | DIASTOLIC BLOOD PRESSURE: 78 MMHG

## 2022-03-10 DIAGNOSIS — Z12.4 ENCOUNTER FOR SCREENING FOR MALIGNANT NEOPLASM OF CERVIX: ICD-10-CM

## 2022-03-10 DIAGNOSIS — Z11.51 SCREENING FOR HPV (HUMAN PAPILLOMAVIRUS): ICD-10-CM

## 2022-03-10 DIAGNOSIS — Z01.419 WELL WOMAN EXAM WITH ROUTINE GYNECOLOGICAL EXAM: Primary | ICD-10-CM

## 2022-03-10 DIAGNOSIS — Z12.31 ENCOUNTER FOR SCREENING MAMMOGRAM FOR MALIGNANT NEOPLASM OF BREAST: ICD-10-CM

## 2022-03-10 PROCEDURE — G0145 SCR C/V CYTO,THINLAYER,RESCR: HCPCS | Performed by: OBSTETRICS & GYNECOLOGY

## 2022-03-10 PROCEDURE — G0476 HPV COMBO ASSAY CA SCREEN: HCPCS | Performed by: OBSTETRICS & GYNECOLOGY

## 2022-03-10 PROCEDURE — 3008F BODY MASS INDEX DOCD: CPT | Performed by: OBSTETRICS & GYNECOLOGY

## 2022-03-10 PROCEDURE — 1036F TOBACCO NON-USER: CPT | Performed by: OBSTETRICS & GYNECOLOGY

## 2022-03-10 PROCEDURE — 99386 PREV VISIT NEW AGE 40-64: CPT | Performed by: OBSTETRICS & GYNECOLOGY

## 2022-03-10 NOTE — PROGRESS NOTES
ASSESSMENT & PLAN:   Diagnoses and all orders for this visit:    Well woman exam with routine gynecological exam  -     Liquid-based pap, screening    Screening for HPV (human papillomavirus)  -     Liquid-based pap, screening    Encounter for screening for malignant neoplasm of cervix  -     Liquid-based pap, screening    Encounter for screening mammogram for malignant neoplasm of breast  -     Mammo screening bilateral w 3d & cad; Future          The following were reviewed in today's visit: ASCCP guidelines, Gardisil vaccination, STD testing breast self exam, mammography screening ordered, exercise and healthy diet  Patient to return to office in yearly for annual exam      All questions have been answered to her satisfaction  Reviewed she is likely perimenopausal  reviewed precautions for evaluation      CC:  Annual Gynecologic Examination  Chief Complaint   Patient presents with    New Patient Visit     patient is new to our practice    Gynecologic Exam     patient is here today for a yearly exam, pap due today  Patients last gyn appt was about 5-6 years ago  She is having very irregular cycles(last one 2 months ago) and she is having the night sweats too  Otherwise she states that she has no concerns  Mammo done on 22 order for  placed  HPI: Margarito Manjarrez is a 50 y o  [id number] who presents for annual gynecologic examination    She has the following concerns: irreg cycles      Health Maintenance:    Exercise: intermittently  Breast exams/breast awareness: yes  Diet: well balanced diet      Past Medical History:   Diagnosis Date    Abnormal Pap smear of cervix     [facility] intoxication ([facility] [address] ) 2021    ED visit    Allergies     bactrim, IV contrast dye    Enlarged pituitary gland ([facility] [address] )     Shoulder pain, right 2019       Past Surgical History:   Procedure Laterality Date     SECTION      x 1    CHOLECYSTECTOMY  2016    COLONOSCOPY  2019    COLONOSCOPY 03/18/2016    GALLBLADDER SURGERY      KNEE SURGERY      MAMMO (HISTORICAL) Bilateral 02/17/2022    NORMAL    TUBAL LIGATION         Past OB/Gyn History:   No LMP recorded (lmp unknown)  Patient is perimenopausal     Menopausal status: perimenopausal  Menopausal symptoms: None    Last Pap: unsure   History of abnormal Pap smear: yes -    Patient is currently sexually active  STD testing: no  Current contraception: tubal ligation      Family History  Family History   Problem Relation Age of Onset    Rheum arthritis Mother     Lupus Mother     Cancer Father     Heart disease Father         bypass    Melanoma Father     Liver cancer Maternal Grandmother     No Known Problems Sister     No Known Problems Brother     No Known Problems Son     No Known Problems Daughter     No Known Problems Sister     No Known Problems Daughter     No Known Problems Daughter     No Known Problems Daughter        Family history of uterine or ovarian cancer: no  Family history of breast cancer: no  Family history of colon cancer: no    Social History:  Social History     Socioeconomic History    Marital status: /Civil Union     Spouse name: Not on file    Number of children: Not on file    Years of education: Not on file    Highest education level: Not on file   Occupational History    Not on file   Tobacco Use    Smoking status: Never Smoker    Smokeless tobacco: Never Used   Vaping Use    Vaping Use: Never used   Substance and Sexual Activity    Alcohol use:  Yes    Drug use: No    Sexual activity: Yes     Partners: Male     Birth control/protection: Surgical   Other Topics Concern    Not on file   Social History Narrative    · Occupation:   house wife         Per [employer]      Social Determinants of Health     Financial Resource Strain: Not on file   Food Insecurity: Not on file   Transportation Needs: Not on file   Physical Activity: Not on file   Stress: Not on file   Social Connections: Not on file Intimate Partner Violence: Not on file   Housing Stability: Not on file     Domestic violence screen: negative    Allergies: Allergies   Allergen Reactions    Bactrim [Sulfamethoxazole-Trimethoprim] Other (See Comments)     Suicidal    Contrast Dye [Iodinated Diagnostic Agents] Rash     Pt denies having an allergy to this and to shellfish/seafood        Medications:  No current outpatient medications on file  Review of Systems:  Review of Systems   Constitutional: Negative  HENT: Negative  Respiratory: Negative  Cardiovascular: Negative  Gastrointestinal: Negative  Genitourinary: Positive for menstrual problem  Neurological: Negative  Psychiatric/Behavioral: Negative  Physical Exam:  /78 (BP Location: Right arm, Patient Position: Sitting, Cuff Size: Large)   Ht 5' 2" (1 575 m)   Wt 79 8 kg (176 lb)   LMP  (LMP Unknown)   BMI 32 19 kg/m²    Physical Exam  Constitutional:       Appearance: Normal appearance  Genitourinary:      Bladder and urethral meatus normal       No lesions in the vagina  Right Labia: No rash, tenderness, lesions, skin changes or Bartholin's cyst      Left Labia: No tenderness, lesions, skin changes, Bartholin's cyst or rash  No vaginal erythema, tenderness or bleeding  Right Adnexa: not tender, not full and no mass present  Left Adnexa: not tender, not full and no mass present  Cervix is parous  No cervical motion tenderness, discharge or polyp  Uterus is not enlarged, fixed or tender  No uterine mass detected  Urethral meatus caruncle not present  No urethral tenderness or mass present  Breasts:      Right: No swelling, bleeding, inverted nipple, mass, nipple discharge, skin change or tenderness  Left: No swelling, bleeding, inverted nipple, mass, nipple discharge, skin change or tenderness  HENT:      Head: Normocephalic and atraumatic     Eyes:      Extraocular Movements: Extraocular movements intact  Conjunctiva/sclera: Conjunctivae normal       Pupils: Pupils are equal, round, and reactive to light  Cardiovascular:      Rate and Rhythm: Normal rate and regular rhythm  Heart sounds: Normal heart sounds  No murmur heard  Pulmonary:      Effort: Pulmonary effort is normal  No respiratory distress  Breath sounds: Normal breath sounds  No wheezing or rales  Abdominal:      General: There is no distension  Palpations: Abdomen is soft  Tenderness: There is no abdominal tenderness  There is no guarding  Neurological:      General: No focal deficit present  Mental Status: She is alert and oriented to person, place, and time  Skin:     General: Skin is warm and dry  Psychiatric:         Mood and Affect: Mood normal          Behavior: Behavior normal    Vitals and nursing note reviewed

## 2022-03-15 LAB
HPV HR 12 DNA CVX QL NAA+PROBE: NEGATIVE
HPV16 DNA CVX QL NAA+PROBE: NEGATIVE
HPV18 DNA CVX QL NAA+PROBE: NEGATIVE

## 2022-03-16 LAB
LAB AP GYN PRIMARY INTERPRETATION: NORMAL
Lab: NORMAL

## 2022-10-12 ENCOUNTER — TELEPHONE (OUTPATIENT)
Dept: GASTROENTEROLOGY | Facility: CLINIC | Age: 47
End: 2022-10-12

## 2022-10-12 NOTE — TELEPHONE ENCOUNTER
Pt called to schedule colonoscopy, spoke with  Joan Simons from [place] [address] office, states pt is not due for colonoscopy until May 2024  Pt informed

## 2022-11-18 ENCOUNTER — APPOINTMENT (EMERGENCY)
Dept: RADIOLOGY | Facility: HOSPITAL | Age: 47
End: 2022-11-18

## 2022-11-18 ENCOUNTER — OFFICE VISIT (OUTPATIENT)
Dept: FAMILY MEDICINE CLINIC | Facility: CLINIC | Age: 47
End: 2022-11-18

## 2022-11-18 ENCOUNTER — HOSPITAL ENCOUNTER (EMERGENCY)
Facility: HOSPITAL | Age: 47
Discharge: HOME | End: 2022-11-18
Attending: EMERGENCY MEDICINE

## 2022-11-18 VITALS
HEIGHT: 62 IN | RESPIRATION RATE: 20 BRPM | SYSTOLIC BLOOD PRESSURE: 113 MMHG | HEART RATE: 109 BPM | DIASTOLIC BLOOD PRESSURE: 61 MMHG | TEMPERATURE: 99 F | WEIGHT: 173 LBS | BODY MASS INDEX: 31.83 KG/M2 | OXYGEN SATURATION: 95 %

## 2022-11-18 VITALS — OXYGEN SATURATION: 94 % | WEIGHT: 173.4 LBS | BODY MASS INDEX: 31.91 KG/M2 | HEIGHT: 62 IN | TEMPERATURE: 103.6 F

## 2022-11-18 DIAGNOSIS — J06.9 UPPER RESPIRATORY INFECTION, ACUTE: Primary | ICD-10-CM

## 2022-11-18 DIAGNOSIS — J06.9 VIRAL URI WITH COUGH: Primary | ICD-10-CM

## 2022-11-18 LAB
ALBUMIN SERPL BCP-MCNC: 4.2 G/DL (ref 3.5–5)
ALP SERPL-CCNC: 73 U/L (ref 34–104)
ALT SERPL W P-5'-P-CCNC: 13 U/L (ref 7–52)
ANION GAP SERPL CALCULATED.3IONS-SCNC: 8 MMOL/L (ref 4–13)
AST SERPL W P-5'-P-CCNC: 16 U/L (ref 13–39)
BACTERIA UR QL AUTO: ABNORMAL /HPF
BASOPHILS # BLD AUTO: 0.03 THOUSANDS/ÂΜL (ref 0–0.1)
BASOPHILS NFR BLD AUTO: 0 % (ref 0–1)
BILIRUB SERPL-MCNC: 0.36 MG/DL (ref 0.2–1)
BILIRUB UR QL STRIP: NEGATIVE
BUN SERPL-MCNC: 8 MG/DL (ref 5–25)
CALCIUM SERPL-MCNC: 9.2 MG/DL (ref 8.4–10.2)
CHLORIDE SERPL-SCNC: 101 MMOL/L (ref 96–108)
CLARITY UR: CLEAR
CO2 SERPL-SCNC: 27 MMOL/L (ref 21–32)
COLOR UR: ABNORMAL
CREAT SERPL-MCNC: 0.8 MG/DL (ref 0.6–1.3)
EOSINOPHIL # BLD AUTO: 0.04 THOUSAND/ÂΜL (ref 0–0.61)
EOSINOPHIL NFR BLD AUTO: 1 % (ref 0–6)
ERYTHROCYTE [DISTWIDTH] IN BLOOD BY AUTOMATED COUNT: 13.6 % (ref 11.6–15.1)
FLUAV RNA RESP QL NAA+PROBE: NEGATIVE
FLUBV RNA RESP QL NAA+PROBE: NEGATIVE
GFR SERPL CREATININE-BSD FRML MDRD: 86 ML/MIN/1.73SQ M
GLUCOSE SERPL-MCNC: 97 MG/DL (ref 65–140)
GLUCOSE UR STRIP-MCNC: NEGATIVE MG/DL
HCT VFR BLD AUTO: 37 % (ref 34.8–46.1)
HGB BLD-MCNC: 11.8 G/DL (ref 11.5–15.4)
HGB UR QL STRIP.AUTO: ABNORMAL
IMM GRANULOCYTES # BLD AUTO: 0.03 THOUSAND/UL (ref 0–0.2)
IMM GRANULOCYTES NFR BLD AUTO: 0 % (ref 0–2)
KETONES UR STRIP-MCNC: NEGATIVE MG/DL
LACTATE SERPL-SCNC: 1.6 MMOL/L (ref 0.5–2)
LEUKOCYTE ESTERASE UR QL STRIP: ABNORMAL
LIPASE SERPL-CCNC: 13 U/L (ref 11–82)
LYMPHOCYTES # BLD AUTO: 1.03 THOUSANDS/ÂΜL (ref 0.6–4.47)
LYMPHOCYTES NFR BLD AUTO: 15 % (ref 14–44)
MCH RBC QN AUTO: 27.3 PG (ref 26.8–34.3)
MCHC RBC AUTO-ENTMCNC: 31.9 G/DL (ref 31.4–37.4)
MCV RBC AUTO: 86 FL (ref 82–98)
MONOCYTES # BLD AUTO: 0.39 THOUSAND/ÂΜL (ref 0.17–1.22)
MONOCYTES NFR BLD AUTO: 6 % (ref 4–12)
NEUTROPHILS # BLD AUTO: 5.54 THOUSANDS/ÂΜL (ref 1.85–7.62)
NEUTS SEG NFR BLD AUTO: 78 % (ref 43–75)
NITRITE UR QL STRIP: NEGATIVE
NON-SQ EPI CELLS URNS QL MICRO: ABNORMAL /HPF
NRBC BLD AUTO-RTO: 0 /100 WBCS
PH UR STRIP.AUTO: 6 [PH]
PLATELET # BLD AUTO: 290 THOUSANDS/UL (ref 149–390)
PMV BLD AUTO: 9.2 FL (ref 8.9–12.7)
POTASSIUM SERPL-SCNC: 3.8 MMOL/L (ref 3.5–5.3)
PROT SERPL-MCNC: 8.5 G/DL (ref 6.4–8.4)
PROT UR STRIP-MCNC: ABNORMAL MG/DL
RBC # BLD AUTO: 4.33 MILLION/UL (ref 3.81–5.12)
RBC #/AREA URNS AUTO: ABNORMAL /HPF
RSV RNA RESP QL NAA+PROBE: NEGATIVE
SARS-COV-2 RNA RESP QL NAA+PROBE: NEGATIVE
SODIUM SERPL-SCNC: 136 MMOL/L (ref 135–147)
SP GR UR STRIP.AUTO: 1.02 (ref 1–1.03)
UROBILINOGEN UR STRIP-ACNC: <2 MG/DL
WBC # BLD AUTO: 7.06 THOUSAND/UL (ref 4.31–10.16)
WBC #/AREA URNS AUTO: ABNORMAL /HPF

## 2022-11-18 RX ORDER — BENZONATATE 100 MG/1
100 CAPSULE ORAL ONCE
Status: COMPLETED | OUTPATIENT
Start: 2022-11-18 | End: 2022-11-18

## 2022-11-18 RX ORDER — BENZONATATE 100 MG/1
100 CAPSULE ORAL EVERY 8 HOURS
Qty: 21 CAPSULE | Refills: 0 | Status: SHIPPED | OUTPATIENT
Start: 2022-11-18 | End: 2022-12-07

## 2022-11-18 RX ORDER — ACETAMINOPHEN 325 MG/1
975 TABLET ORAL ONCE
Status: COMPLETED | OUTPATIENT
Start: 2022-11-18 | End: 2022-11-18

## 2022-11-18 RX ORDER — ACETAMINOPHEN 325 MG/1
650 TABLET ORAL ONCE
Status: DISCONTINUED | OUTPATIENT
Start: 2022-11-18 | End: 2022-11-18

## 2022-11-18 RX ADMIN — BENZONATATE 100 MG: 100 CAPSULE ORAL at 14:09

## 2022-11-18 RX ADMIN — ACETAMINOPHEN 975 MG: 325 TABLET ORAL at 11:30

## 2022-11-18 RX ADMIN — SODIUM CHLORIDE 1000 ML: 0.9 INJECTION, SOLUTION INTRAVENOUS at 11:53

## 2022-11-18 NOTE — PROGRESS NOTES
Name: Kacie Burroughs      : 1975      MRN: [id number]  Encounter Provider: LORENA Caicedo  Encounter Date: 2022   Encounter department: [address] [place] [address] MEDICINE    Assessment & Plan     1  Upper respiratory infection, acute  Assessment & Plan:  Ill appearing, O2 sats 94%, febrile  Referred to ER for CXR, hydration has tachycardia, SOB and chest pain  Also need to r/o flu/RSV/COVID  Subjective      Has been having chills, cough nasal congestion, sore throat, headache since Monday  She did COVID test two days go that was negative  Her symptoms have worsened she is SOB, has chest pain and burning  Oxygen saturation in office is 94% per spouse she is unable to keep anything down, temp is 103  Advise need to go to ER for chest xray, fluids r/o flu/COVID/RSV  Review of Systems   Constitutional: Positive for chills, fatigue and fever  HENT: Positive for sneezing and sore throat  Respiratory: Positive for cough, chest tightness and shortness of breath  Cardiovascular: Positive for chest pain  Gastrointestinal: Positive for nausea  Musculoskeletal: Positive for myalgias  Neurological: Positive for dizziness and headaches  No current outpatient medications on file prior to visit  Objective     Temp (!) 103 6 °F (39 8 °C) (Tympanic)   Ht 5' 2" (1 575 m)   Wt 78 7 kg (173 lb 6 4 oz)   SpO2 94%   BMI 31 72 kg/m²     Physical Exam  Vitals and nursing note reviewed  Constitutional:       General: She is in acute distress  Appearance: She is well-developed  She is ill-appearing  HENT:      Head: Normocephalic and atraumatic  Pulmonary:      Effort: Pulmonary effort is normal  No respiratory distress  Neurological:      Mental Status: She is alert         [id number] [address] [employer] [place]

## 2022-11-18 NOTE — ASSESSMENT & PLAN NOTE
Ill appearing, O2 sats 94%, febrile  Referred to ER for CXR, hydration has tachycardia, SOB and chest pain  Also need to r/o flu/RSV/COVID

## 2022-11-18 NOTE — ED PROVIDER NOTES
History  Chief Complaint   Patient presents with   • URI     Sent from PCP for URI symptoms since Monday  51 yo f w/ no significant pmhx present w/ URI  She states that on Monday she began to develop sore throat that progressively worsened into productive cough w/ yellow sputum, shortness of breath, and burning sensation in the chest when she coughs  Attempted dayquil and nyquil over the past day w/o relief  Took COVID test and was found to be negative at that time  Went to her primary for the symptoms above and was advised to come to the ER for further evaluation  No regular mediations  Denies previous medical issues  Denies alcohol, tobacco, recreational drug use  Endorses fevers, chills, myalgias, sore throat, productive cough w/ yellow sputum, SOB, and burning chest pain on cough  Denies HA, hearing changes, vision changes, diaphoresis, N/V, abdominal pain, dysuria, decreased urine output, recent travel, recent surgery, hormone therapy, hx of DVT/PE, leg swelling, leg pain             None       Past Medical History:   Diagnosis Date   • Abnormal Pap smear of cervix    • Alcohol intoxication ([facility] [address] ) 2021    ED visit   • Allergies     bactrim, IV contrast dye   • Enlarged pituitary gland (HCC)    • Shoulder pain, right 2019       Past Surgical History:   Procedure Laterality Date   •  SECTION      x 1   • CHOLECYSTECTOMY  2016   • COLONOSCOPY  2019   • COLONOSCOPY  2016   • GALLBLADDER SURGERY     • KNEE SURGERY     • MAMMO (HISTORICAL) Bilateral 2022    NORMAL   • TUBAL LIGATION         Family History   Problem Relation Age of Onset   • Rheum arthritis Mother    • Lupus Mother    • Cancer Father    • Heart disease Father         bypass   • Melanoma Father    • Liver cancer Maternal Grandmother    • No Known Problems Sister    • No Known Problems Brother    • No Known Problems Son    • No Known Problems Daughter    • No Known Problems Sister    • No Known Problems Daughter    • No Known Problems Daughter    • No Known Problems Daughter      I have reviewed and agree with the history as documented  E-Cigarette/Vaping   • E-Cigarette Use Never User      E-Cigarette/Vaping Substances   • Nicotine No    • THC No    • CBD No    • Flavoring No    • Other No    • Unknown No      Social History     Tobacco Use   • Smoking status: Never   • Smokeless tobacco: Never   Vaping Use   • Vaping Use: Never used   Substance Use Topics   • Alcohol use: Yes   • Drug use: No        Review of Systems   Constitutional: Positive for chills and fever  Negative for diaphoresis  HENT: Positive for sore throat  Negative for hearing loss, sinus pressure and sinus pain  Eyes: Negative for pain and visual disturbance  Respiratory: Positive for cough  Negative for chest tightness and shortness of breath  Cardiovascular: Positive for chest pain  Negative for palpitations and leg swelling  Gastrointestinal: Negative for abdominal pain, constipation, diarrhea, nausea and vomiting  Endocrine: Negative for polyuria  Genitourinary: Negative for dysuria, frequency and urgency  Musculoskeletal: Positive for myalgias  Negative for neck pain  Skin: Negative for color change and rash  Neurological: Negative for weakness, numbness and headaches  All other systems reviewed and are negative  Physical Exam  ED Triage Vitals [11/18/22 1105]   Temperature Pulse Respirations Blood Pressure SpO2   (!) 101 5 °F (38 6 °C) (!) 119 22 131/73 98 %      Temp Source Heart Rate Source Patient Position - Orthostatic VS BP Location FiO2 (%)   Oral Monitor Lying Left arm --      Pain Score       No Pain             Orthostatic Vital Signs  Vitals:    11/18/22 1105 11/18/22 1300   BP: 131/73 113/61   Pulse: (!) 119 (!) 109   Patient Position - Orthostatic VS: Lying        Physical Exam  Vitals and nursing note reviewed  Constitutional:       General: She is in acute distress        Appearance: Normal appearance  She is normal weight  She is not ill-appearing, toxic-appearing or diaphoretic  HENT:      Head: Normocephalic and atraumatic  Right Ear: Tympanic membrane, ear canal and external ear normal  There is no impacted cerumen  Left Ear: Tympanic membrane, ear canal and external ear normal  There is no impacted cerumen  Nose: Nose normal  No congestion or rhinorrhea  Mouth/Throat:      Mouth: Mucous membranes are moist       Pharynx: Oropharynx is clear  Posterior oropharyngeal erythema present  No oropharyngeal exudate  Comments: Uvula midline  No exudates or tonsillar swelling  No oral lesions or ulcerations  Compartment of the mandible are soft and nontender  Eyes:      General: No scleral icterus  Right eye: No discharge  Left eye: No discharge  Extraocular Movements: Extraocular movements intact  Conjunctiva/sclera: Conjunctivae normal       Pupils: Pupils are equal, round, and reactive to light  Cardiovascular:      Rate and Rhythm: Regular rhythm  Tachycardia present  Pulses: Normal pulses  Heart sounds: Normal heart sounds  Pulmonary:      Effort: Pulmonary effort is normal  No respiratory distress  Breath sounds: Normal breath sounds  No stridor  No wheezing, rhonchi or rales  Chest:      Chest wall: No tenderness  Abdominal:      General: There is no distension  Palpations: Abdomen is soft  There is no mass  Tenderness: There is no abdominal tenderness  There is no right CVA tenderness, left CVA tenderness, guarding or rebound  Musculoskeletal:         General: No swelling, tenderness, deformity or signs of injury  Normal range of motion  Cervical back: Normal range of motion and neck supple  No rigidity or tenderness  Right lower leg: No edema  Left lower leg: No edema  Lymphadenopathy:      Cervical: No cervical adenopathy  Skin:     General: Skin is warm        Capillary Refill: Capillary refill takes less than 2 seconds  Coloration: Skin is not jaundiced or pale  Findings: No bruising, erythema, lesion or rash  Neurological:      General: No focal deficit present  Mental Status: She is alert and oriented to person, place, and time  Mental status is at baseline  Sensory: No sensory deficit  Motor: No weakness        Gait: Gait normal    Psychiatric:         Mood and Affect: Mood normal          Behavior: Behavior normal          ED Medications  Medications   acetaminophen (TYLENOL) tablet 975 mg (975 mg Oral Given 11/18/22 1130)   sodium chloride 0 9 % bolus 1,000 mL (0 mL Intravenous Stopped 11/18/22 1404)   benzonatate (TESSALON PERLES) capsule 100 mg (100 mg Oral Given 11/18/22 1409)       Diagnostic Studies  Results Reviewed     Procedure Component Value Units Date/Time    Urine Microscopic [[id number]]  (Abnormal) Collected: 11/18/22 1217    Lab Status: Final result Specimen: Urine, Clean Catch Updated: 11/18/22 1330     RBC, UA 4-10 /hpf      WBC, UA 2-4 /hpf      Epithelial Cells Occasional /hpf      Bacteria, UA None Seen /hpf     UA w Reflex to Microscopic w Reflex to Culture [[id number]]  (Abnormal) Collected: 11/18/22 1217    Lab Status: Final result Specimen: Urine, Clean Catch Updated: 11/18/22 1241     Color, UA Light Yellow     Clarity, UA Clear     Specific [place], UA 1 020     pH, UA 6 0     Leukocytes, UA Trace     Nitrite, UA Negative     Protein, UA Trace mg/dl      Glucose, UA Negative mg/dl      Ketones, UA Negative mg/dl      Urobilinogen, UA <2 0 mg/dl      Bilirubin, UA Negative     Occult Blood, UA Moderate    Comprehensive metabolic panel [[id number]]  (Abnormal) Collected: 11/18/22 1150    Lab Status: Final result Specimen: Blood from Arm, Left Updated: 11/18/22 1216     Sodium 136 mmol/L      Potassium 3 8 mmol/L      Chloride 101 mmol/L      CO2 27 mmol/L      ANION GAP 8 mmol/L      BUN 8 mg/dL      Creatinine 0 80 mg/dL      Glucose 97 mg/dL      Calcium 9 2 mg/dL      AST 16 U/L      ALT 13 U/L      Alkaline Phosphatase 73 U/L      Total Protein 8 5 g/dL      Albumin 4 2 g/dL      Total Bilirubin 0 36 mg/dL      eGFR 86 ml/min/1 73sq m     Narrative:      [facility] guidelines for Chronic Kidney Disease (CKD):   •  Stage 1 with normal or high GFR (GFR > 90 mL/min/1 73 square meters)  •  Stage 2 Mild CKD (GFR = 60-89 mL/min/1 73 square meters)  •  Stage 3A Moderate CKD (GFR = 45-59 mL/min/1 73 square meters)  •  Stage 3B Moderate CKD (GFR = 30-44 mL/min/1 73 square meters)  •  Stage 4 Severe CKD (GFR = 15-29 mL/min/1 73 square meters)  •  Stage 5 End Stage CKD (GFR <15 mL/min/1 73 square meters)  Note: GFR calculation is accurate only with a steady state creatinine    Lipase [[id number]]  (Normal) Collected: 11/18/22 1150    Lab Status: Final result Specimen: Blood from Arm, Left Updated: 11/18/22 1216     Lipase 13 u/L     FLU/RSV/COVID - if FLU/RSV clinically relevant [[id number]]  (Normal) Collected: 11/18/22 1127    Lab Status: Final result Specimen: Nares from Nose Updated: 11/18/22 1215     SARS-CoV-2 Negative     INFLUENZA A PCR Negative     INFLUENZA B PCR Negative     RSV PCR Negative    Narrative:      FOR PEDIATRIC PATIENTS - copy/paste COVID Guidelines URL to browser: [email or web address]    SARS-CoV-2 assay is a Nucleic Acid Amplification assay intended for the  qualitative detection of nucleic acid from SARS-CoV-2 in nasopharyngeal  swabs  Results are for the presumptive identification of SARS-CoV-2 RNA  Positive results are indicative of infection with SARS-CoV-2, the virus  causing COVID-19, but do not rule out bacterial infection or co-infection  with other viruses  Laboratories within the [place] and its  territories are required to report all positive results to the appropriate  public health authorities   Negative results do not preclude SARS-CoV-2  infection and should not be used as the sole basis for treatment or other  patient management decisions  Negative results must be combined with  clinical observations, patient history, and epidemiological information  This test has not been FDA cleared or approved  This test has been authorized by FDA under an Emergency Use Authorization  (EUA)  This test is only authorized for the duration of time the  declaration that circumstances exist justifying the authorization of the  emergency use of an in vitro diagnostic tests for detection of SARS-CoV-2  virus and/or diagnosis of COVID-19 infection under section 564(b)(1) of  the Act, 21 U  S C  834YZH-1(Y)(5), unless the authorization is terminated  or revoked sooner  The test has been validated but independent review by FDA  and CLIA is pending  Test performed using [employer] GeneXpert: This RT-PCR assay targets N2,  a region unique to SARS-CoV-2  A conserved region in the E-gene was chosen  for pan-Sarbecovirus detection which includes SARS-CoV-2  According to CMS-2020-01-R, this platform meets the definition of high-[employer] technology  Lactic acid [[id number]]  (Normal) Collected: 11/18/22 1150    Lab Status: Final result Specimen: Blood from Arm, Left Updated: 11/18/22 1214     LACTIC ACID 1 6 mmol/L     Narrative:      Result may be elevated if tourniquet was used during collection      CBC and differential [[id number]]  (Abnormal) Collected: 11/18/22 1150    Lab Status: Final result Specimen: Blood from Arm, Left Updated: 11/18/22 1206     WBC 7 06 Thousand/uL      RBC 4 33 Million/uL      Hemoglobin 11 8 g/dL      Hematocrit 37 0 %      MCV 86 fL      MCH 27 3 pg      MCHC 31 9 g/dL      RDW 13 6 %      MPV 9 2 fL      Platelets 160 Thousands/uL      nRBC 0 /100 WBCs      Neutrophils Relative 78 %      Immat GRANS % 0 %      Lymphocytes Relative 15 %      Monocytes Relative 6 %      Eosinophils Relative 1 %      Basophils Relative 0 % Neutrophils Absolute 5 54 Thousands/µL      Immature Grans Absolute 0 03 Thousand/uL      Lymphocytes Absolute 1 03 Thousands/µL      Monocytes Absolute 0 39 Thousand/µL      Eosinophils Absolute 0 04 Thousand/µL      Basophils Absolute 0 03 Thousands/µL                  XR chest 1 view portable   ED Interpretation by Herve Castro MD (11/18 1223)   No acute cardiopulmonary process noted  No acute changes noted when compared to previous cxr  Final Result by Yue Rodrigues MD (11/18 1232)      No acute cardiopulmonary disease  Findings are stable            Workstation performed: [id number]               Procedures  ECG 12 Lead Documentation Only    Date/Time: 11/18/2022 12:24 PM  Performed by: Herve Castro MD  Authorized by: Herve Castro MD       EKG November 18, 2022 at 12:17 p m  Tachycardia rate of 107, normal WA interval, normal QRS interval, normal QT interval, , normal axis, nonspecific T-wave flattening in lead 3 no pathological Q-waves noted, no STEMI, no ischemic changes, no previous EKG for comparison  ED Course                                       MDM  Number of Diagnoses or Management Options  Viral URI with cough  Diagnosis management comments: 51 yo f presents w/ URI  Sent in from pcp for further workup  Acute distess, non toxic, tachycardia, HEENT WNL, lung clear, normal cardiac sounds, abd SNT, No leg swelling or pain, no concerning features for DVT  CXR showed no acute process when compared to previous  EKG ST w/o STEMI or concerning features  Flu/covid/rsv negative  Normal UA  Labs wnl  Likely viral upper respiratory tract infection  Advised patient of findings  Supportive care and strict return precautions given  Patient advised that symptoms and infection may evolve and patient should return for any new or worsening symptoms  Patient in agreement with plan          Amount and/or Complexity of Data Reviewed  Clinical lab tests: ordered and reviewed  Tests in the radiology section of CPT®: ordered and reviewed  Tests in the medicine section of CPT®: ordered and reviewed  Decide to obtain previous medical records or to obtain history from someone other than the patient: yes  Obtain history from someone other than the patient: yes  Review and summarize past medical records: yes  Independent visualization of images, tracings, or specimens: yes        Disposition  Final diagnoses:   Viral URI with cough     Time reflects when diagnosis was documented in both MDM as applicable and the Disposition within this note     Time User Action Codes Description Comment    11/18/2022  1:53 PM Rona Alexander Add [J06 [id number]] Viral URI with cough       ED Disposition     ED Disposition   Discharge    Condition   Stable    Date/Time   Fri Nov 18, 2022  1:57 PM    Comment   Evan Hill discharge to home/self care  Follow-up Information     Follow up With Specialties Details Why Contact Info Additional Information    Taurus Arredondo MD Internal Medicine, Pediatrics Go in 2 days  [address]  [address] [place] [address] Emergency Department Emergency Medicine Go to  If symptoms worsen [address] Emergency Department,  [address], [place], [place], [address]          Discharge Medication List as of 11/18/2022  1:57 PM      START taking these medications    Details   benzonatate (TESSALON PERLES) 100 mg capsule Take 1 capsule (100 mg total) by mouth every 8 (eight) hours, Starting Fri 11/18/2022, Normal           No discharge procedures on file  PDMP Review     None           ED Provider  Attending physically available and evaluated Evan Hill  I managed the patient along with the ED Attending      Electronically Signed by         Jana Contreras MD  11/18/22 6318

## 2022-11-18 NOTE — DISCHARGE INSTRUCTIONS
-please follow up with your primary care physician as needed  -please take medications as prescribed  -Please return to the emergency department if you begin to develop high fevers unresolved by tylenol and ibuprofen, shortness of breath, chest pain, cold sweats, pale/blue discoloration of the skin

## 2022-11-19 NOTE — ED ATTENDING ATTESTATION
11/18/2022  IJuliana MD, saw and evaluated the patient  I have discussed the patient with the resident/non-physician practitioner and agree with the resident's/non-physician practitioner's findings, Plan of Care, and MDM as documented in the resident's/non-physician practitioner's note, except where noted  All available labs and Radiology studies were reviewed  I was present for key portions of any procedure(s) performed by the resident/non-physician practitioner and I was immediately available to provide assistance  At this point I agree with the current assessment done in the Emergency Department    I have conducted an independent evaluation of this patient a history and physical is as follows:see h and p above- agree with er resident  tx plan/ dispo    ED Course  ED Course as of 11/19/22 8695   Fri Nov 18, 2022   1343 Cxr reviewed by md- no infiltrATE         Critical Care Time  Procedures

## 2022-11-20 LAB
ATRIAL RATE: 107 BPM
P AXIS: 61 DEGREES
PR INTERVAL: 134 MS
QRS AXIS: 46 DEGREES
QRSD INTERVAL: 88 MS
QT INTERVAL: 332 MS
QTC INTERVAL: 443 MS
T WAVE AXIS: 48 DEGREES
VENTRICULAR RATE: 107 BPM

## 2022-11-28 ENCOUNTER — TELEPHONE (OUTPATIENT)
Dept: FAMILY MEDICINE CLINIC | Facility: CLINIC | Age: 47
End: 2022-11-28

## 2022-11-28 DIAGNOSIS — J40 BRONCHITIS: Primary | ICD-10-CM

## 2022-11-28 RX ORDER — AZITHROMYCIN 250 MG/1
TABLET, FILM COATED ORAL
Qty: 6 TABLET | Refills: 0 | Status: SHIPPED | OUTPATIENT
Start: 2022-11-28 | End: 2022-12-02

## 2022-11-28 RX ORDER — ALBUTEROL SULFATE 90 UG/1
2 AEROSOL, METERED RESPIRATORY (INHALATION) EVERY 6 HOURS PRN
Qty: 18 G | Refills: 5 | Status: SHIPPED | OUTPATIENT
Start: 2022-11-28 | End: 2022-12-07

## 2022-11-28 NOTE — TELEPHONE ENCOUNTER
Was scheduled for an ov on 11/18 and was sent over to the ER  They gave her tessalon perls for the cough  Patient has a lot of phlem, and still congested and coughing and the tessalon perls are not really helping  She wants to know if you would call something else in for her?     Pharmacy - [address]    Patient ph # [phone]

## 2022-12-06 RX ORDER — HYDROCODONE BITARTRATE AND ACETAMINOPHEN 5; 325 MG/1; MG/1
1 TABLET ORAL EVERY 4 HOURS PRN
COMMUNITY
Start: 2022-10-05 | End: 2022-12-07

## 2022-12-06 RX ORDER — IBUPROFEN 600 MG/1
TABLET ORAL
COMMUNITY
Start: 2022-10-05 | End: 2022-12-07

## 2022-12-07 ENCOUNTER — OFFICE VISIT (OUTPATIENT)
Dept: FAMILY MEDICINE CLINIC | Facility: CLINIC | Age: 47
End: 2022-12-07

## 2022-12-07 VITALS
SYSTOLIC BLOOD PRESSURE: 120 MMHG | RESPIRATION RATE: 16 BRPM | TEMPERATURE: 97.3 F | BODY MASS INDEX: 31.83 KG/M2 | DIASTOLIC BLOOD PRESSURE: 80 MMHG | WEIGHT: 173 LBS | HEIGHT: 62 IN | OXYGEN SATURATION: 96 % | HEART RATE: 87 BPM

## 2022-12-07 DIAGNOSIS — M94.8X9 POLYCHONDRITIS: ICD-10-CM

## 2022-12-07 DIAGNOSIS — E23.7 ABNORMALITY OF PITUITARY GLAND (HCC): Primary | ICD-10-CM

## 2023-01-17 PROBLEM — J06.9 UPPER RESPIRATORY INFECTION, ACUTE: Status: RESOLVED | Noted: 2022-11-18 | Resolved: 2023-01-17

## 2023-02-02 ENCOUNTER — RA CDI HCC (OUTPATIENT)
Dept: OTHER | Facility: HOSPITAL | Age: 48
End: 2023-02-02

## 2023-02-02 NOTE — PROGRESS NOTES
[facility]  coding opportunities       Chart reviewed, no opportunity found: CHART REVIEWED, NO OPPORTUNITY FOUND        Patients Insurance        Commercial Insurance: [employer]

## 2023-02-08 ENCOUNTER — OFFICE VISIT (OUTPATIENT)
Dept: FAMILY MEDICINE CLINIC | Facility: CLINIC | Age: 48
End: 2023-02-08

## 2023-02-08 VITALS
HEIGHT: 62 IN | BODY MASS INDEX: 32.27 KG/M2 | RESPIRATION RATE: 16 BRPM | HEART RATE: 88 BPM | DIASTOLIC BLOOD PRESSURE: 82 MMHG | WEIGHT: 175.38 LBS | OXYGEN SATURATION: 96 % | SYSTOLIC BLOOD PRESSURE: 120 MMHG | TEMPERATURE: 99.1 F

## 2023-02-08 DIAGNOSIS — Z28.21 INFLUENZA VACCINATION DECLINED: ICD-10-CM

## 2023-02-08 DIAGNOSIS — Z00.00 ANNUAL PHYSICAL EXAM: Primary | ICD-10-CM

## 2023-02-08 DIAGNOSIS — Z12.4 CERVICAL CANCER SCREENING: ICD-10-CM

## 2023-02-08 DIAGNOSIS — R53.83 OTHER FATIGUE: ICD-10-CM

## 2023-02-08 DIAGNOSIS — Z12.31 BREAST CANCER SCREENING BY MAMMOGRAM: ICD-10-CM

## 2023-02-08 DIAGNOSIS — Z13.31 DEPRESSION SCREENING NEGATIVE: ICD-10-CM

## 2023-02-08 DIAGNOSIS — D35.2 PITUITARY MICROADENOMA (HCC): ICD-10-CM

## 2023-02-08 DIAGNOSIS — Z12.11 COLON CANCER SCREENING: ICD-10-CM

## 2023-02-08 DIAGNOSIS — Z13.220 LIPID SCREENING: ICD-10-CM

## 2023-02-08 DIAGNOSIS — Z13.29 THYROID DISORDER SCREENING: ICD-10-CM

## 2023-02-08 NOTE — PROGRESS NOTES
[address] [facility] FAMILY MEDICINE    NAME: Mary Kate Huff  AGE: 46 y o  SEX: female  : 1975     DATE: 2023     Assessment and Plan:     Problem List Items Addressed This Visit        Endocrine    Pituitary microadenoma [facility])     Has follow up MRI scheduled for pituitary gland-labs ordered         Relevant Orders    Prolactin   Other Visit Diagnoses     Annual physical exam    -  Primary    Relevant Orders    CBC and differential    Comprehensive metabolic panel    Lipid panel    TSH, 3rd generation    Depression screening negative        Lipid screening        Relevant Orders    Lipid panel    Thyroid disorder screening        Relevant Orders    TSH, 3rd generation    Breast cancer screening by mammogram        Relevant Orders    Mammo screening bilateral w 3d & cad    Influenza vaccination declined        BMI 32 0-32 9,adult        Other fatigue        Relevant Orders    Iron Panel (Includes Ferritin, Iron Sat%, Iron, and TIBC)    Colon cancer screening        Is going to follow up with GI on this    Cervical cancer screening        had normal pap and hpv in 2022          Immunizations and preventive care screenings were discussed with patient today  Appropriate education was printed on patient's after visit summary  Counseling:  Alcohol/drug use: discussed moderation in alcohol intake, the recommendations for healthy alcohol use, and avoidance of illicit drug use  Dental Health: discussed importance of regular tooth brushing, flossing, and dental visits  · Exercise: the importance of regular exercise/physical activity was discussed  Recommend exercise 3-5 times per week for at least 30 minutes            Return in about 1 year (around 2024) for Annual physical      Chief Complaint:     Chief Complaint   Patient presents with   • Physical Exam     Colonoscopy completed 19 - repeat colonoscopy 5 years () was recommendation (Dr Marnie Mead) - printed result from [facility] PDF file and will scan result into chart  History of Present Illness:     Adult Annual Physical   Patient here for a comprehensive physical exam  The patient reports no problems  Diet and Physical Activity  · Diet/Nutrition: well balanced diet  · Exercise: moderate cardiovascular exercise  Depression Screening  PHQ-2/9 Depression Screening    Little interest or pleasure in doing things: 0 - not at all  Feeling down, depressed, or hopeless: 0 - not at all  PHQ-2 Score: 0  PHQ-2 Interpretation: Negative depression screen       General Health  · Sleep: sleeps well  · Hearing: normal - none   · Vision: goes for regular eye exams  · Dental: regular dental visits  /GYN Health  · Patient is: perimenopausal  · Last menstrual period: last month  · Contraceptive method: not discussion  Review of Systems:     Review of Systems   Constitutional: Positive for fatigue  HENT: Negative  Respiratory: Negative  Cardiovascular: Negative  Gastrointestinal: Positive for constipation and diarrhea  Musculoskeletal: Negative  Neurological: Negative  Hematological: Negative         Past Medical History:     Past Medical History:   Diagnosis Date   • Abnormal Pap smear of cervix    • Alcohol intoxication ([facility] [address] ) 2021    ED visit   • Allergies     bactrim, IV contrast dye   • Enlarged pituitary gland ([facility] [address] )    • Shoulder pain, right 2019      Past Surgical History:     Past Surgical History:   Procedure Laterality Date   •  SECTION      x 1   • CHOLECYSTECTOMY  2016   • COLONOSCOPY  2019    to be done in 5 years ()   • COLONOSCOPY  2016   • GALLBLADDER SURGERY     • KNEE SURGERY     • MAMMO (HISTORICAL) Bilateral 2022    NORMAL   • TUBAL LIGATION        Social History:     Social History     Socioeconomic History   • Marital status: /Civil Union     Spouse name: None   • Number of children: None   • Years of education: None   • Highest education level: None   Occupational History   • None   Tobacco Use   • Smoking status: Never   • Smokeless tobacco: Never   Vaping Use   • Vaping Use: Never used   Substance and Sexual Activity   • Alcohol use: Yes     Comment: Social drinker   • Drug use: No   • Sexual activity: Yes     Partners: Male     Birth control/protection: Surgical   Other Topics Concern   • None   Social History Narrative    · Occupation:   house wife         [place] [employer]      Social Determinants of Health     Financial Resource Strain: Not on file   Food Insecurity: Not on file   Transportation Needs: Not on file   Physical Activity: Not on file   Stress: Not on file   Social Connections: Not on file   Intimate Partner Violence: Not on file   Housing Stability: Not on file      Family History:     Family History   Problem Relation Age of Onset   • Rheum arthritis Mother    • Lupus Mother    • Arthritis Mother         Rheumatoid   • Cancer Father    • Heart disease Father         bypass   • Melanoma Father    • Liver cancer Maternal Grandmother    • No Known Problems Sister    • No Known Problems Brother    • No Known Problems Son    • No Known Problems Daughter    • No Known Problems Sister    • No Known Problems Daughter    • No Known Problems Daughter    • No Known Problems Daughter       Current Medications:     No current outpatient medications on file  No current facility-administered medications for this visit  Allergies:      Allergies   Allergen Reactions   • Bactrim [Sulfamethoxazole-Trimethoprim] Other (See Comments)     Suicidal   • Contrast Dye [Iodinated Contrast Media] Rash     Pt denies having an allergy to this and to shellfish/seafood       Physical Exam:     /82 (BP Location: Left arm)   Pulse 88   Temp 99 1 °F (37 3 °C) (Tympanic)   Resp 16   Ht 5' 2" (1 575 m)   Wt 79 5 kg (175 lb 6 oz)   SpO2 96%   BMI 32 08 kg/m²     Physical Exam  Constitutional:       Appearance: Normal appearance  HENT:      Head: Normocephalic and atraumatic  Right Ear: External ear normal       Left Ear: External ear normal       Nose: Nose normal       Mouth/Throat:      Mouth: Mucous membranes are moist    Eyes:      Extraocular Movements: Extraocular movements intact  Pupils: Pupils are equal, round, and reactive to light  Cardiovascular:      Rate and Rhythm: Regular rhythm  Heart sounds: Normal heart sounds  Pulmonary:      Effort: Pulmonary effort is normal       Breath sounds: Normal breath sounds  Abdominal:      General: Abdomen is flat  Palpations: Abdomen is soft  Musculoskeletal:         General: Normal range of motion  Cervical back: Normal range of motion and neck supple  Skin:     General: Skin is warm  Neurological:      General: No focal deficit present  Mental Status: She is alert and oriented to person, place, and time  Psychiatric:         Mood and Affect: Mood normal          Thought Content: Thought content normal          Judgment: Judgment normal           Allison Castillo MD  [facility] MEDICINE     BMI Counseling: Body mass index is 32 08 kg/m²  The BMI is above normal  Nutrition recommendations include reducing portion sizes, decreasing overall calorie intake, 3-5 servings of fruits/vegetables daily, reducing fast food intake, consuming healthier snacks, decreasing soda and/or juice intake, moderation in carbohydrate intake, increasing intake of lean protein, reducing intake of saturated fat and trans fat and reducing intake of cholesterol  Exercise recommendations include exercising 3-5 times per week and strength training exercises

## 2023-02-14 ENCOUNTER — ANESTHESIA EVENT (OUTPATIENT)
Dept: RADIOLOGY | Facility: HOSPITAL | Age: 48
End: 2023-02-14

## 2023-02-14 ENCOUNTER — APPOINTMENT (OUTPATIENT)
Dept: LAB | Facility: CLINIC | Age: 48
End: 2023-02-14

## 2023-02-14 DIAGNOSIS — Z00.00 ANNUAL PHYSICAL EXAM: ICD-10-CM

## 2023-02-14 DIAGNOSIS — R53.83 OTHER FATIGUE: ICD-10-CM

## 2023-02-14 DIAGNOSIS — D35.2 PITUITARY MICROADENOMA (HCC): ICD-10-CM

## 2023-02-14 DIAGNOSIS — Z13.220 LIPID SCREENING: ICD-10-CM

## 2023-02-14 DIAGNOSIS — Z13.29 THYROID DISORDER SCREENING: ICD-10-CM

## 2023-02-14 LAB
ALBUMIN SERPL BCP-MCNC: 3.2 G/DL (ref 3.5–5)
ALP SERPL-CCNC: 64 U/L (ref 46–116)
ALT SERPL W P-5'-P-CCNC: 17 U/L (ref 12–78)
ANION GAP SERPL CALCULATED.3IONS-SCNC: 5 MMOL/L (ref 4–13)
AST SERPL W P-5'-P-CCNC: 12 U/L (ref 5–45)
BASOPHILS # BLD AUTO: 0.04 THOUSANDS/ÂΜL (ref 0–0.1)
BASOPHILS NFR BLD AUTO: 1 % (ref 0–1)
BILIRUB SERPL-MCNC: 0.27 MG/DL (ref 0.2–1)
BUN SERPL-MCNC: 11 MG/DL (ref 5–25)
CALCIUM ALBUM COR SERPL-MCNC: 9.1 MG/DL (ref 8.3–10.1)
CALCIUM SERPL-MCNC: 8.5 MG/DL (ref 8.3–10.1)
CHLORIDE SERPL-SCNC: 110 MMOL/L (ref 96–108)
CHOLEST SERPL-MCNC: 182 MG/DL
CO2 SERPL-SCNC: 26 MMOL/L (ref 21–32)
CREAT SERPL-MCNC: 0.67 MG/DL (ref 0.6–1.3)
EOSINOPHIL # BLD AUTO: 0.08 THOUSAND/ÂΜL (ref 0–0.61)
EOSINOPHIL NFR BLD AUTO: 1 % (ref 0–6)
ERYTHROCYTE [DISTWIDTH] IN BLOOD BY AUTOMATED COUNT: 14.1 % (ref 11.6–15.1)
FERRITIN SERPL-MCNC: 5 NG/ML (ref 8–388)
GFR SERPL CREATININE-BSD FRML MDRD: 102 ML/MIN/1.73SQ M
GLUCOSE P FAST SERPL-MCNC: 91 MG/DL (ref 65–99)
HCT VFR BLD AUTO: 33 % (ref 34.8–46.1)
HDLC SERPL-MCNC: 52 MG/DL
HGB BLD-MCNC: 9.9 G/DL (ref 11.5–15.4)
IMM GRANULOCYTES # BLD AUTO: 0.01 THOUSAND/UL (ref 0–0.2)
IMM GRANULOCYTES NFR BLD AUTO: 0 % (ref 0–2)
IRON SATN MFR SERPL: 7 % (ref 15–50)
IRON SERPL-MCNC: 28 UG/DL (ref 50–170)
LDLC SERPL CALC-MCNC: 108 MG/DL (ref 0–100)
LYMPHOCYTES # BLD AUTO: 2.31 THOUSANDS/ÂΜL (ref 0.6–4.47)
LYMPHOCYTES NFR BLD AUTO: 41 % (ref 14–44)
MCH RBC QN AUTO: 25.3 PG (ref 26.8–34.3)
MCHC RBC AUTO-ENTMCNC: 30 G/DL (ref 31.4–37.4)
MCV RBC AUTO: 84 FL (ref 82–98)
MONOCYTES # BLD AUTO: 0.36 THOUSAND/ÂΜL (ref 0.17–1.22)
MONOCYTES NFR BLD AUTO: 6 % (ref 4–12)
NEUTROPHILS # BLD AUTO: 2.87 THOUSANDS/ÂΜL (ref 1.85–7.62)
NEUTS SEG NFR BLD AUTO: 51 % (ref 43–75)
NONHDLC SERPL-MCNC: 130 MG/DL
NRBC BLD AUTO-RTO: 0 /100 WBCS
PLATELET # BLD AUTO: 380 THOUSANDS/UL (ref 149–390)
PMV BLD AUTO: 9.7 FL (ref 8.9–12.7)
POTASSIUM SERPL-SCNC: 3.7 MMOL/L (ref 3.5–5.3)
PROLACTIN SERPL-MCNC: 15.9 NG/ML
PROT SERPL-MCNC: 7.5 G/DL (ref 6.4–8.4)
RBC # BLD AUTO: 3.91 MILLION/UL (ref 3.81–5.12)
SODIUM SERPL-SCNC: 141 MMOL/L (ref 135–147)
TIBC SERPL-MCNC: 408 UG/DL (ref 250–450)
TRIGL SERPL-MCNC: 112 MG/DL
TSH SERPL DL<=0.05 MIU/L-ACNC: 1.86 UIU/ML (ref 0.45–4.5)
WBC # BLD AUTO: 5.67 THOUSAND/UL (ref 4.31–10.16)

## 2023-02-22 ENCOUNTER — TELEPHONE (OUTPATIENT)
Dept: OTHER | Facility: OTHER | Age: 48
End: 2023-02-22

## 2023-02-22 NOTE — TELEPHONE ENCOUNTER
Pt called to cancel their apt for 2/23 due to a family need but would like to reschedule for next wk if possible  Please call pt back to reschedule

## 2023-03-08 ENCOUNTER — ANESTHESIA (OUTPATIENT)
Dept: RADIOLOGY | Facility: HOSPITAL | Age: 48
End: 2023-03-08

## 2023-03-08 ENCOUNTER — HOSPITAL ENCOUNTER (OUTPATIENT)
Dept: RADIOLOGY | Facility: HOSPITAL | Age: 48
Discharge: HOME | End: 2023-03-08
Attending: INTERNAL MEDICINE

## 2023-03-08 VITALS
HEIGHT: 62 IN | BODY MASS INDEX: 32.2 KG/M2 | HEART RATE: 63 BPM | DIASTOLIC BLOOD PRESSURE: 61 MMHG | WEIGHT: 175 LBS | OXYGEN SATURATION: 99 % | SYSTOLIC BLOOD PRESSURE: 107 MMHG | TEMPERATURE: 96.7 F | RESPIRATION RATE: 18 BRPM

## 2023-03-08 DIAGNOSIS — E23.7 ABNORMALITY OF PITUITARY GLAND (HCC): ICD-10-CM

## 2023-03-08 RX ORDER — DEXAMETHASONE SODIUM PHOSPHATE 10 MG/ML
INJECTION, SOLUTION INTRAMUSCULAR; INTRAVENOUS AS NEEDED
Status: DISCONTINUED | OUTPATIENT
Start: 2023-03-08 | End: 2023-03-08

## 2023-03-08 RX ORDER — SODIUM CHLORIDE, SODIUM LACTATE, POTASSIUM CHLORIDE, CALCIUM CHLORIDE 600; 310; 30; 20 MG/100ML; MG/100ML; MG/100ML; MG/100ML
50 INJECTION, SOLUTION INTRAVENOUS CONTINUOUS
Status: DISCONTINUED | OUTPATIENT
Start: 2023-03-08 | End: 2023-03-09 | Stop reason: HOSPADM

## 2023-03-08 RX ORDER — LIDOCAINE HYDROCHLORIDE 10 MG/ML
INJECTION, SOLUTION EPIDURAL; INFILTRATION; INTRACAUDAL; PERINEURAL AS NEEDED
Status: DISCONTINUED | OUTPATIENT
Start: 2023-03-08 | End: 2023-03-08

## 2023-03-08 RX ORDER — ONDANSETRON 2 MG/ML
4 INJECTION INTRAMUSCULAR; INTRAVENOUS ONCE AS NEEDED
Status: DISCONTINUED | OUTPATIENT
Start: 2023-03-08 | End: 2023-03-09 | Stop reason: HOSPADM

## 2023-03-08 RX ORDER — ONDANSETRON 2 MG/ML
INJECTION INTRAMUSCULAR; INTRAVENOUS AS NEEDED
Status: DISCONTINUED | OUTPATIENT
Start: 2023-03-08 | End: 2023-03-08

## 2023-03-08 RX ORDER — GLYCOPYRROLATE 0.2 MG/ML
INJECTION INTRAMUSCULAR; INTRAVENOUS AS NEEDED
Status: DISCONTINUED | OUTPATIENT
Start: 2023-03-08 | End: 2023-03-08

## 2023-03-08 RX ORDER — LIDOCAINE HYDROCHLORIDE 10 MG/ML
0.5 INJECTION, SOLUTION EPIDURAL; INFILTRATION; INTRACAUDAL; PERINEURAL ONCE AS NEEDED
Status: COMPLETED | OUTPATIENT
Start: 2023-03-08 | End: 2023-03-08

## 2023-03-08 RX ORDER — OXYCODONE HYDROCHLORIDE AND ACETAMINOPHEN 5; 325 MG/1; MG/1
1 TABLET ORAL ONCE AS NEEDED
Status: DISCONTINUED | OUTPATIENT
Start: 2023-03-08 | End: 2023-03-09 | Stop reason: HOSPADM

## 2023-03-08 RX ORDER — PROPOFOL 10 MG/ML
INJECTION, EMULSION INTRAVENOUS AS NEEDED
Status: DISCONTINUED | OUTPATIENT
Start: 2023-03-08 | End: 2023-03-08

## 2023-03-08 RX ADMIN — SODIUM CHLORIDE, SODIUM LACTATE, POTASSIUM CHLORIDE, AND CALCIUM CHLORIDE 50 ML/HR: .6; .31; .03; .02 INJECTION, SOLUTION INTRAVENOUS at 10:44

## 2023-03-08 RX ADMIN — PROPOFOL 200 MG: 10 INJECTION, EMULSION INTRAVENOUS at 12:06

## 2023-03-08 RX ADMIN — ONDANSETRON 4 MG: 2 INJECTION INTRAMUSCULAR; INTRAVENOUS at 12:06

## 2023-03-08 RX ADMIN — LIDOCAINE HYDROCHLORIDE 50 MG: 10 INJECTION, SOLUTION EPIDURAL; INFILTRATION; INTRACAUDAL; PERINEURAL at 12:06

## 2023-03-08 RX ADMIN — GADOBUTROL 8 ML: 604.72 INJECTION INTRAVENOUS at 12:52

## 2023-03-08 RX ADMIN — LIDOCAINE HYDROCHLORIDE 0.5 ML: 10 INJECTION, SOLUTION EPIDURAL; INFILTRATION; INTRACAUDAL; PERINEURAL at 10:45

## 2023-03-08 RX ADMIN — DEXAMETHASONE SODIUM PHOSPHATE 5 MG: 10 INJECTION, SOLUTION INTRAMUSCULAR; INTRAVENOUS at 12:06

## 2023-03-08 RX ADMIN — GLYCOPYRROLATE 0.1 MG: 0.2 INJECTION, SOLUTION INTRAMUSCULAR; INTRAVENOUS at 12:06

## 2023-03-08 RX ADMIN — SODIUM CHLORIDE, SODIUM LACTATE, POTASSIUM CHLORIDE, AND CALCIUM CHLORIDE: .6; .31; .03; .02 INJECTION, SOLUTION INTRAVENOUS at 12:00

## 2023-03-08 NOTE — ADDENDUM NOTE
Addendum  created 03/08/23 1337 by Lashawn Keys MD    Order list changed, Pharmacy for encounter modified

## 2023-03-08 NOTE — ANESTHESIA PREPROCEDURE EVALUATION
Procedure:  MRI BRAIN PITUITARY WO AND W CONTRAST    Relevant Problems   MUSCULOSKELETAL   (+) Lateral epicondylitis of left elbow      NEURO/PSYCH   (+) Double vision   (+) New daily persistent headache             Anesthesia Plan  ASA Score- 2     Anesthesia Type- general with ASA Monitors  Additional Monitors:   Airway Plan: LMA  Plan Factors-    Chart reviewed  EKG reviewed  Existing labs reviewed  Patient summary reviewed  Induction- intravenous  Postoperative Plan-     Informed Consent- Anesthetic plan and risks discussed with patient  I personally reviewed this patient with the CRNA  Discussed and agreed on the Anesthesia Plan with the CRNA  Chhaya Rodriguez

## 2023-03-08 NOTE — NURSING NOTE
MRI brain pituitary w/without contrast complete  Pt tolerated well  VSS post procedure  Pt alert and oriented x4 on room air  No complaints or visible signs of distress  Report given to Maurizio casey

## 2023-03-08 NOTE — ANESTHESIA POSTPROCEDURE EVALUATION
Post-Op Assessment Note    CV Status:  Stable    Pain management: adequate  Multimodal analgesia used between 6 hours prior to anesthesia start to PACU discharge    Mental Status:  Alert and awake   Hydration Status:  Euvolemic and stable   PONV Controlled:  Controlled   Airway Patency:  Patent   Two or more mitigation strategies used for obstructive sleep apnea   Post Op Vitals Reviewed: Yes      Staff: CRNA         No notable events documented      BP   123/76   Temp  98   Pulse  16   Resp   12   SpO2   99

## 2023-03-14 ENCOUNTER — TELEPHONE (OUTPATIENT)
Dept: NEUROLOGY | Facility: CLINIC | Age: 48
End: 2023-03-14

## 2023-03-14 NOTE — TELEPHONE ENCOUNTER
Provider out of the office 03/23/2023  Please reschedule appt  Left message on voicemail  (1)    Plz see offer appt on 03/18/2023 if availability

## 2023-03-15 ENCOUNTER — TELEPHONE (OUTPATIENT)
Dept: NEUROLOGY | Facility: CLINIC | Age: 48
End: 2023-03-15

## 2023-03-15 NOTE — TELEPHONE ENCOUNTER
Pt called back to reschedule, per note below I offered 3-18-23 and I offered her 1130 am and she accepted it

## 2023-03-15 NOTE — TELEPHONE ENCOUNTER
Left message on voicemail  Provider out of the office on 03/18/2023  Please help rescheduling pt to next available

## 2023-03-16 NOTE — TELEPHONE ENCOUNTER
patient called back and asked to be scheduled for the first available in either in [facility] or [facility] and I offered her 4-11-23 at 1 am in [facility] and added her to the wait list and she accepted

## 2023-04-03 ENCOUNTER — OFFICE VISIT (OUTPATIENT)
Dept: GASTROENTEROLOGY | Facility: CLINIC | Age: 48
End: 2023-04-03

## 2023-04-03 VITALS
SYSTOLIC BLOOD PRESSURE: 133 MMHG | DIASTOLIC BLOOD PRESSURE: 69 MMHG | HEART RATE: 90 BPM | HEIGHT: 62 IN | BODY MASS INDEX: 33.31 KG/M2 | WEIGHT: 181 LBS

## 2023-04-03 DIAGNOSIS — K64.2 GRADE III HEMORRHOIDS: ICD-10-CM

## 2023-04-03 DIAGNOSIS — K62.5 RECTAL BLEED: Primary | ICD-10-CM

## 2023-04-03 RX ORDER — HYDROCORTISONE 25 MG/G
CREAM TOPICAL 2 TIMES DAILY
Qty: 28 G | Refills: 2 | Status: SHIPPED | OUTPATIENT
Start: 2023-04-03

## 2023-04-03 NOTE — PROGRESS NOTES
[facility] - Outpatient Follow-up Note  Nataliya Torres 46 y o  female MRN: [id number]  Encounter: [id number]          ASSESSMENT AND PLAN:      1  Rectal bleed  Patient is having rectal bleeding intermittently with discomfort  She is also having itching and mild pain off and on  Long discussion regarding high-fiber diet and hemorrhoidal cream   Anusol cream has been prescribed  Patient denies any other issues such as abdominal pain or discomfort  - psyllium (KONSYL) 33 % POWD; Take by mouth daily  Dispense: 390 g; Refill: 4  - hydrocortisone (ANUSOL-HC) 2 5 % rectal cream; Apply topically 2 (two) times a day  Dispense: 28 g; Refill: 2    2  Grade III hemorrhoids  Patient has hemorrhoids  The hemorrhoid seems to be prolapsing out during bowel movement and then goes back in  Anoscopy and banding will be planned in 4 to 6 weeks  - psyllium (KONSYL) 33 % POWD; Take by mouth daily  Dispense: 390 g; Refill: 4  - hydrocortisone (ANUSOL-HC) 2 5 % rectal cream; Apply topically 2 (two) times a day  Dispense: 28 g; Refill: 2    ______________________________________________________________________    SUBJECTIVE: Rectal bleed and discomfort  Some difficulty in evacuating  No weight loss or weight gain      REVIEW OF SYSTEMS IS OTHERWISE NEGATIVE        Historical Information   Past Medical History:   Diagnosis Date   • Abnormal Pap smear of cervix    • Alcohol intoxication ([facility] [address] ) 2021    ED visit   • Allergies     bactrim, IV contrast dye   • Enlarged pituitary gland [facility])    • Shoulder pain, right 2019     Past Surgical History:   Procedure Laterality Date   •  SECTION      x 1   • CHOLECYSTECTOMY  2016   • COLONOSCOPY  2019    to be done in 5 years ()   • COLONOSCOPY  2016   • GALLBLADDER SURGERY     • KNEE SURGERY     • MAMMO (HISTORICAL) Bilateral 2022    NORMAL   • TUBAL LIGATION       Social History   Social History     Substance and "Sexual Activity   Alcohol Use Yes    Comment: Social drinker     Social History     Substance and Sexual Activity   Drug Use No     Social History     Tobacco Use   Smoking Status Never   Smokeless Tobacco Never     Family History   Problem Relation Age of Onset   • Rheum arthritis Mother    • Lupus Mother    • Arthritis Mother         Rheumatoid   • Cancer Father    • Heart disease Father         bypass   • Melanoma Father    • Liver cancer Maternal Grandmother    • No Known Problems Sister    • No Known Problems Brother    • No Known Problems Son    • No Known Problems Daughter    • No Known Problems Sister    • No Known Problems Daughter    • No Known Problems Daughter    • No Known Problems Daughter        Meds/Allergies       Current Outpatient Medications:   •  hydrocortisone (ANUSOL-HC) 2 5 % rectal cream  •  psyllium (KONSYL) 33 % POWD    Allergies   Allergen Reactions   • Bactrim [Sulfamethoxazole-Trimethoprim] Other (See Comments)     Suicidal   • Contrast Dye [Iodinated Contrast Media] Rash           Objective     Blood pressure 133/69, pulse 90, height 5' 2\" (1 575 m), weight 82 1 kg (181 lb)  Body mass index is 33 11 kg/m²  PHYSICAL EXAM:      General Appearance:   Alert, cooperative, no distress   HEENT:   Normocephalic, atraumatic, anicteric      Neck:  Supple, symmetrical, trachea midline   Lungs:   Clear to auscultation bilaterally; no rales, rhonchi or wheezing; respirations unlabored    Heart[de-identified]   Regular rate and rhythm; no murmur, rub, or gallop  Abdomen:   Soft, non-tender, non-distended; normal bowel sounds; no masses, no organomegaly    Genitalia:   Deferred    Rectal:   Deferred    Extremities:  No cyanosis, clubbing or edema    Pulses:  2+ and symmetric    Skin:  No jaundice, rashes, or lesions    Lymph nodes:  No palpable cervical lymphadenopathy        Lab Results:   No visits with results within 1 Day(s) from this visit     Latest known visit with results is:   Appointment on " 02/14/2023   Component Date Value   • WBC 02/14/2023 5 67    • RBC 02/14/2023 3 91    • Hemoglobin 02/14/2023 9 9 (L)    • Hematocrit 02/14/2023 33 0 (L)    • MCV 02/14/2023 84    • MCH 02/14/2023 25 3 (L)    • MCHC 02/14/2023 30 0 (L)    • RDW 02/14/2023 14 1    • MPV 02/14/2023 9 7    • Platelets 98/06/4332 380    • nRBC 02/14/2023 0    • Neutrophils Relative 02/14/2023 51    • Immat GRANS % 02/14/2023 0    • Lymphocytes Relative 02/14/2023 41    • Monocytes Relative 02/14/2023 6    • Eosinophils Relative 02/14/2023 1    • Basophils Relative 02/14/2023 1    • Neutrophils Absolute 02/14/2023 2 87    • Immature Grans Absolute 02/14/2023 0 01    • Lymphocytes Absolute 02/14/2023 2 31    • Monocytes Absolute 02/14/2023 0 36    • Eosinophils Absolute 02/14/2023 0 08    • Basophils Absolute 02/14/2023 0 04    • Sodium 02/14/2023 141    • Potassium 02/14/2023 3 7    • Chloride 02/14/2023 110 (H)    • CO2 02/14/2023 26    • ANION GAP 02/14/2023 5    • BUN 02/14/2023 11    • Creatinine 02/14/2023 0 67    • Glucose, Fasting 02/14/2023 91    • Calcium 02/14/2023 8 5    • Corrected Calcium 02/14/2023 9 1    • AST 02/14/2023 12    • ALT 02/14/2023 17    • Alkaline Phosphatase 02/14/2023 64    • Total Protein 02/14/2023 7 5    • Albumin 02/14/2023 3 2 (L)    • Total Bilirubin 02/14/2023 0 27    • eGFR 02/14/2023 102    • Cholesterol 02/14/2023 182    • Triglycerides 02/14/2023 112    • HDL, Direct 02/14/2023 52    • LDL Calculated 02/14/2023 108 (H)    • Non-HDL-Chol (CHOL-HDL) 02/14/2023 130    • TSH 3RD GENERATON 02/14/2023 1 860    • Prolactin 02/14/2023 15 9    • Iron Saturation 02/14/2023 7 (L)    • TIBC 02/14/2023 408    • Iron 02/14/2023 28 (L)    • Ferritin 02/14/2023 5 (L)          Radiology Results:   MRI brain pituitary wo and w contrast    Result Date: 3/11/2023  Narrative: MRI BRAIN AND SELLA  WITH AND WITHOUT CONTRAST INDICATION:  E23 7: Disorder of pituitary gland, unspecified     Abnormal MRI pituitary gland with worsening headaches patient describes episodes of throbbing pain and inflammation involving cartilaginous portion of left ureter  Occasional episodes of blurry vision  COMPARISON: Relevant examinations including MRI brain 1/13/2021 TECHNIQUE: Multiplanar, multisequence imaging of the brain and sella was performed before and after gadolinium administration  Targeted images of the sella were performed requiring additional time at acquisition and interpretation of approximately 25% IV Contrast:  8 mL of Gadobutrol injection (SINGLE-DOSE) IMAGE QUALITY:  Diagnostic  FINDINGS: BRAIN PARENCHYMA AND ADJACENT EXTRA-AXIAL SPACES: There are no areas of abnormal signal intensity evident within brain parenchyma  There is no expansile mass evident  VENTRICLES: Normal size for age  SELLA AND PITUITARY GLAND:  Again noted pituitary gland considered mildly enlarged for age with superior convex margin and deviation of the pituitary stalk to the left  There is an approximately 5 mm ovoid area of intrinsic T1 shortening and heterogeneously lower gadolinium enhancement along the right inferior margin of the adenohypophysis  Overall findings are similar to prior MRIs  Normal parasellar and suprasellar structures  ORBITS:  No significant abnormality evident  PARANASAL SINUSES AND TEMPORAL BONES: The paranasal sinuses and temporal bones appear to be well aerated  No middle or inner ear abnormality evident  VASCULATURE:  No significant vascular abnormality evident  CALVARIUM, SKULL BASE, AND UPPER CERVICAL SPINE:  No significant abnormality evident  EXTRACRANIAL SOFT TISSUES:  No significant abnormality evident  Impression: Pituitary gland considered abnormally enlarged for age with deviation of the pituitary stalk to the left and abnormal signal and enhancement along the right inferior aspect of the adenohypophysis similar to prior MRIs dating back to 9/25/2020 and probably pituitary microadenoma   No abnormality evident involving the left ear  Workstation performed:  [id number]

## 2023-04-04 ENCOUNTER — TELEPHONE (OUTPATIENT)
Dept: OTHER | Facility: OTHER | Age: 48
End: 2023-04-04

## 2023-04-04 NOTE — TELEPHONE ENCOUNTER
Patient is requesting a call back from  office to discuss in more detail the procedure she will be having in May

## 2023-04-05 ENCOUNTER — TELEPHONE (OUTPATIENT)
Dept: NEUROLOGY | Facility: CLINIC | Age: 48
End: 2023-04-05

## 2023-04-05 NOTE — TELEPHONE ENCOUNTER
Provider out of the office on 04/11/2023    Left message on voicemail to cb to reschedule  Appt slot on hold for 04/21/2023

## 2023-04-21 ENCOUNTER — OFFICE VISIT (OUTPATIENT)
Dept: NEUROLOGY | Facility: CLINIC | Age: 48
End: 2023-04-21

## 2023-04-21 VITALS
HEART RATE: 78 BPM | TEMPERATURE: 97.4 F | SYSTOLIC BLOOD PRESSURE: 118 MMHG | HEIGHT: 62 IN | DIASTOLIC BLOOD PRESSURE: 80 MMHG | BODY MASS INDEX: 32.57 KG/M2 | WEIGHT: 177 LBS

## 2023-04-21 DIAGNOSIS — G51.0 FACIAL PARALYSIS ON RIGHT SIDE: ICD-10-CM

## 2023-04-21 DIAGNOSIS — E23.7: Primary | ICD-10-CM

## 2023-04-21 DIAGNOSIS — E23.7 ABNORMALITY OF PITUITARY GLAND (HCC): ICD-10-CM

## 2023-04-21 DIAGNOSIS — G44.85 PRIMARY STABBING HEADACHE: ICD-10-CM

## 2023-04-21 DIAGNOSIS — R76.8 POSITIVE SM/RNP ANTIBODY: ICD-10-CM

## 2023-04-21 DIAGNOSIS — D35.2 PITUITARY MICROADENOMA (HCC): ICD-10-CM

## 2023-04-21 RX ORDER — DEXAMETHASONE 2 MG/1
2 TABLET ORAL DAILY PRN
Qty: 5 TABLET | Refills: 2 | Status: SHIPPED | OUTPATIENT
Start: 2023-04-21

## 2023-04-21 RX ORDER — GABAPENTIN 100 MG/1
100 CAPSULE ORAL DAILY
Qty: 30 CAPSULE | Refills: 0 | Status: SHIPPED | OUTPATIENT
Start: 2023-04-21

## 2023-04-21 NOTE — PROGRESS NOTES
Patient ID: Bertha Cooney is a 46 y o  female  Assessment/Plan:           Problem List Items Addressed This Visit        Endocrine    Pituitary microadenoma [facility])    Relevant Orders    Ambulatory Referral to Endocrinology    MRI brain pituitary wo and w contrast    Abnormality of pituitary gland (HCC)    Disorder of adenohypophysis (HCC) - Primary    Relevant Medications    dexamethasone (DECADRON) 2 mg tablet    gabapentin (Neurontin) 100 mg capsule    Other Relevant Orders    Ambulatory Referral to Endocrinology    MRI brain pituitary wo and w contrast    BUN    Creatinine, serum       Nervous and Auditory    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    Relevant Medications    gabapentin (Neurontin) 100 mg capsule       Other    Positive sm/RNP antibody   Other Visit Diagnoses     Primary stabbing headache        Relevant Medications    dexamethasone (DECADRON) 2 mg tablet    gabapentin (Neurontin) 100 mg capsule         Mrs Kaila Staples has presented to [address] [place] with her  for follow-up multiple neurological complaint  Patient has been following with me for facial paralysis with complete resolution noted based on today's exam   Patient described what we believe left-sided glossopharyngeal neuralgia, patient was offered gabapentin 100 mg capsule and Decadron 2 mg on as-needed basis but patient may require trial of carbamazepine or lamotrigine if pain became more frequent or intensity get worse  Patient was last seen by me in November 2020 with MRI brain was completed in November 2020 and January 2021  No contrast-enhancing areas in brain parenchyma or manages identified at that time  Brain MRI were repeated in March 2023 with following findings has been described : pituitary gland considered abnormally enlarged for age with deviation of the pituitary stalk to the left and abnormal signal and enhancement along the right inferior aspect of the adenohypophysis      Question if patient start developing hypophysitis after her recent bacterial infection which she had suffered in November 2022  Patient had serologic work-up and evaluation for viruses as she was negative for COVID/flu/RSV  Patient does not have signs of meningitis or encephalitis on today's evaluation  No migraine headache has been reported  No significant visual dysfunction described  Patient will be offered evaluation by endocrinology, and I agree with any treatment choices offered to the patient if clinically advised  Patient will have MRI of the brain repeated within 6 months around September 2023  Patient has remote history of positive markers for lupus, evaluation by rheumatology completed several years ago and no diagnosis been established without clinical presentation  Patient continue describing knee pain and ankle pain with knee replacement required  Patient is to follow-up primary care team for that matter  Patient will be advised spinal tap if repeated MRI suggest worsening imaging and concern for hypophysitis became high  Patient is to follow-up with optometrist for visual field ( VF) evaluation  Patient is to follow [facility] neurology 1 week after MRI completed and available for my review    Subjective: Pituitary microadenoma and related issues    HPI  Mrs Yoana Garcia is a 51 yo female who was referred to [address] [place] [address] for follow up on left facial paralysis and abnormal brain imaging   Patient was last seen in November 2020  Since last 2-1/2 years, patient describes having left ear to neck pain which last couple seconds but frequency has been getting worse  Patient states the right side of her head also has pain with pain radiating to the back  Patient states the right-sided headache may last for 1 minute and comes with blurriness and dizzy  Patient also reported left ear cartilage pain which last up to 2 days because may last 12 hours  Patient reports no joint pain    Patient states she always has knee pain and foot pain  Patient has been limping  Patient has a rheumatology evaluation with Dr Melchor Chin  Patient has known ascites 2009, lateral epicondylitis    Double vision was also described  XIOMY/Sjogren's/RF/ Lyme/CRPdsDNA//negative; ESR 36 elevated; EMMY RNP ab positive; Patient described her current sxs are headache rating 6-7 right side temporal traveling to back of head, nause, fatigue, dizziness, lightheadedness and sleep disrtubance  Patient has been reporting emotional disturbances since her last brain MRI, patient has recurrent crying spells, with thought hurting herself reported, and patient is considering establishing her care at [facility] with no medical management has been requested  Patient has no new focal neurologic deficit  Patient has been going through perimenopause         The following portions of the patient's history were reviewed and updated as appropriate:   She  has a past medical history of Abnormal Pap smear of cervix, Alcohol intoxication ([facility] ) (2021), Allergies, Enlarged pituitary gland ([facility] ), and Shoulder pain, right (2019)  She   Patient Active Problem List    Diagnosis Date Noted   • Abnormality of pituitary gland ([facility] ) 2023   • Disorder of adenohypophysis ([facility] ) 2023   • New daily persistent headache 2020   • Excessive crying of adult 2020   • Pituitary microadenoma ([facility] ) 10/07/2020   • Positive sm/RNP antibody 10/07/2020   • Double vision 2020   • 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 2020   • Lateral epicondylitis of left elbow 2019     She  has a past surgical history that includes Knee surgery; Gallbladder surgery;  section; Tubal ligation; Colonoscopy (2019); Cholecystectomy (2016); Colonoscopy (2016); and Mammo (historical) (Bilateral, 2022)    Her family history includes Arthritis in her mother; Cancer in her father; Heart disease in her father; Liver cancer "in her maternal grandmother; Lupus in her mother; Melanoma in her father; No Known Problems in her brother, daughter, daughter, daughter, daughter, sister, sister, and son; Rheum arthritis in her mother  She  reports that she has never smoked  She has never used smokeless tobacco  She reports current alcohol use  She reports that she does not use drugs  Current Outpatient Medications   Medication Sig Dispense Refill   • albuterol (Ventolin HFA) 90 mcg/act inhaler Inhale 2 puffs every 4 (four) hours as needed for wheezing 18 g 0   • dexamethasone (DECADRON) 2 mg tablet Take 1 tablet (2 mg total) by mouth daily as needed (headache) 5 tablet 2   • gabapentin (Neurontin) 100 mg capsule Take 1 capsule (100 mg total) by mouth daily 30 capsule 0   • hydrocortisone (ANUSOL-HC) 2 5 % rectal cream Apply topically 2 (two) times a day 28 g 2   • promethazine-dextromethorphan (PHENERGAN-DM) 6 25-15 mg/5 mL oral syrup Take 5 mL by mouth 4 (four) times a day as needed for cough 100 mL 0   • psyllium (KONSYL) 33 % POWD Take by mouth daily (Patient not taking: Reported on 4/21/2023) 390 g 4     No current facility-administered medications for this visit  Current Outpatient Medications on File Prior to Visit   Medication Sig   • albuterol (Ventolin HFA) 90 mcg/act inhaler Inhale 2 puffs every 4 (four) hours as needed for wheezing   • hydrocortisone (ANUSOL-HC) 2 5 % rectal cream Apply topically 2 (two) times a day   • promethazine-dextromethorphan (PHENERGAN-DM) 6 25-15 mg/5 mL oral syrup Take 5 mL by mouth 4 (four) times a day as needed for cough   • psyllium (KONSYL) 33 % POWD Take by mouth daily (Patient not taking: Reported on 4/21/2023)     No current facility-administered medications on file prior to visit  She is allergic to bactrim [sulfamethoxazole-trimethoprim] and contrast dye [iodinated contrast media]            Objective:    Blood pressure 118/80, pulse 78, temperature (!) 97 4 °F (36 3 °C), height 5' 2\" (1 575 " m), weight 80 3 kg (177 lb)  Physical Exam    Neurological Exam  CONSTITUTIONAL: NAD, pleasant  NECK: supple, no lymphadenopathy, no thyromegaly, no JVD  CARDIOVASCULAR: RRR, normal S1S2, no murmurs, no rubs  RESP: clear to auscultation bilaterally, no wheezes/rhonchi/rales  ABDOMEN: soft, non tender, non distended  SKIN: no rash or skin lesions  EXTREMITIES: no edema, pulses 2+bilaterally  PSYCH: appropriate mood and affect  NEUROLOGIC COMPREHENSIVE EXAM: Patient is oriented to person, place and time, NAD; appropriate affect  CN II, III, IV, V, VI, VII,VIII,IX,X,XI-XII intact with EOMI, PERRLA, OKN intact, VF grossly intact, fundi poorly visualized secondary to pupillary constriction; symmetric face noted  Motor: 5/5 UE/LE bilateral symmetric; Sensory: intact to light touch and pinprick bilaterally; normal vibration sensation feet bilaterally; Coordination within normal limits on FTN and CHRISTI testing; DTR: 2/4 through, no Babinski, no clonus  Tandem gait is intact  Romberg: absent  ROS:    Review of Systems   Constitutional: Negative  Negative for appetite change and fever  HENT: Positive for ear pain  Negative for hearing loss, tinnitus, trouble swallowing and voice change  Has pain around the outside of L ear, can't touch it, radiates down the back of her neck sometimes can last the entire day  Used to be only for a few minutes now comes on sooner and a lot more frequent   Eyes: Negative  Negative for photophobia, pain and visual disturbance  Having vision problems awaiting visit with optometrist   Respiratory: Negative  Negative for shortness of breath  Cardiovascular: Negative  Negative for palpitations  Gastrointestinal: Negative  Negative for nausea and vomiting  Endocrine: Negative  Negative for cold intolerance  Genitourinary: Negative  Negative for dysuria, frequency and urgency  Musculoskeletal: Negative  Negative for gait problem, myalgias and neck pain     Skin: Negative  Negative for rash  Allergic/Immunologic: Negative  Neurological: Positive for dizziness, facial asymmetry and headaches  Negative for tremors, seizures, syncope, speech difficulty, weakness, light-headedness and numbness  Gets a sharp pain R side of head that causes dizziness lasts about a minute  Started happening again around 11/2022  Has HX of [place] palsy   Hematological: Negative  Does not bruise/bleed easily  Psychiatric/Behavioral: Negative  Negative for confusion, hallucinations and sleep disturbance  All other systems reviewed and are negative

## 2023-04-24 ENCOUNTER — TELEPHONE (OUTPATIENT)
Dept: FAMILY MEDICINE CLINIC | Facility: CLINIC | Age: 48
End: 2023-04-24

## 2023-04-24 NOTE — TELEPHONE ENCOUNTER
Shawn Ro was has been sick for the last week and now she has a cold sore  She been using otc but not helping wants to know if something could be called in or should she be seen

## 2023-04-25 ENCOUNTER — OFFICE VISIT (OUTPATIENT)
Dept: FAMILY MEDICINE CLINIC | Facility: CLINIC | Age: 48
End: 2023-04-25

## 2023-04-25 VITALS
OXYGEN SATURATION: 99 % | TEMPERATURE: 98 F | DIASTOLIC BLOOD PRESSURE: 72 MMHG | BODY MASS INDEX: 31.83 KG/M2 | WEIGHT: 173 LBS | RESPIRATION RATE: 16 BRPM | SYSTOLIC BLOOD PRESSURE: 118 MMHG | HEIGHT: 62 IN | HEART RATE: 77 BPM

## 2023-04-25 DIAGNOSIS — B00.1 COLD SORE: Primary | ICD-10-CM

## 2023-04-25 RX ORDER — VALACYCLOVIR HYDROCHLORIDE 1 G/1
TABLET, FILM COATED ORAL
Qty: 4 TABLET | Refills: 5 | Status: SHIPPED | OUTPATIENT
Start: 2023-04-25 | End: 2023-04-25

## 2023-04-25 NOTE — PROGRESS NOTES
Assessment/Plan:         Problem List Items Addressed This Visit    None  Visit Diagnoses     Cold sore    -  Primary    Will rx Valtrex with refills and suggest ice to area and continued use of Abbreva    Relevant Medications    valACYclovir (VALTREX) 1,000 mg tablet            Subjective:      Patient ID: Courtney Avila is a 46 y o  female  [facility] spot right corner of her mouth-has had it for a few days now-started out like a cold sore and has been using abrevva cream-still painful and starts to bleed at times when she opens her mouth    Mouth Lesions   Associated symptoms include mouth sores  Pertinent negatives include no fever  The following portions of the patient's history were reviewed and updated as appropriate:   Past Medical History:  She has a past medical history of Abnormal Pap smear of cervix, Alcohol intoxication ([facility] ) (2021), Allergies, Enlarged pituitary gland ([facility] ), and Shoulder pain, right (2019)  ,  _______________________________________________________________________  Medical Problems:  does not have any pertinent problems on file ,  _______________________________________________________________________  Past Surgical History:   has a past surgical history that includes Knee surgery; Gallbladder surgery;  section; Tubal ligation; Colonoscopy (2019); Cholecystectomy (2016); Colonoscopy (2016); and Mammo (historical) (Bilateral, 2022)  ,  _______________________________________________________________________  Family History:  family history includes Arthritis in her mother; Cancer in her father; Heart disease in her father; Liver cancer in her maternal grandmother; Lupus in her mother; Melanoma in her father; No Known Problems in her brother, daughter, daughter, daughter, daughter, sister, sister, and son;  Rheum arthritis in her mother ,  _______________________________________________________________________  Social History: "reports that she has never smoked  She has never used smokeless tobacco  She reports current alcohol use  She reports that she does not use drugs  ,  _______________________________________________________________________  Allergies:  is allergic to bactrim [sulfamethoxazole-trimethoprim] and contrast dye [iodinated contrast media]     _______________________________________________________________________  Current Outpatient Medications   Medication Sig Dispense Refill   • albuterol (Ventolin HFA) 90 mcg/act inhaler Inhale 2 puffs every 4 (four) hours as needed for wheezing 18 g 0   • dexamethasone (DECADRON) 2 mg tablet Take 1 tablet (2 mg total) by mouth daily as needed (headache) 5 tablet 2   • gabapentin (Neurontin) 100 mg capsule Take 1 capsule (100 mg total) by mouth daily 30 capsule 0   • hydrocortisone (ANUSOL-HC) 2 5 % rectal cream Apply topically 2 (two) times a day 28 g 2   • valACYclovir (VALTREX) 1,000 mg tablet Take 2 tablets BID 4 tablet 5     No current facility-administered medications for this visit      _______________________________________________________________________  Review of Systems   Constitutional: Negative for fatigue and fever  HENT: Positive for mouth sores  Neurological: Negative  Hematological: Negative  Objective:  Vitals:    04/25/23 0904   BP: 118/72   BP Location: Left arm   Patient Position: Sitting   Cuff Size: Standard   Pulse: 77   Resp: 16   Temp: 98 °F (36 7 °C)   TempSrc: Tympanic   SpO2: 99%   Weight: 78 5 kg (173 lb)   Height: 5' 2\" (1 575 m)     Body mass index is 31 64 kg/m²  Physical Exam  Constitutional:       Appearance: Normal appearance  HENT:      Head: Normocephalic and atraumatic        Right Ear: External ear normal       Left Ear: External ear normal       Nose: Nose normal       Mouth/Throat:      Comments: Cold sore looking lesion right corner of mouth with some surrounding erythema  Cardiovascular:      Rate and Rhythm: Normal rate " and regular rhythm  Pulmonary:      Effort: Pulmonary effort is normal       Breath sounds: Normal breath sounds  Musculoskeletal:      Cervical back: Normal range of motion and neck supple  No tenderness  Lymphadenopathy:      Cervical: No cervical adenopathy  Neurological:      General: No focal deficit present  Mental Status: She is alert and oriented to person, place, and time  Mental status is at baseline  Psychiatric:         Mood and Affect: Mood normal          Thought Content:  Thought content normal          Judgment: Judgment normal

## 2023-05-01 ENCOUNTER — TELEPHONE (OUTPATIENT)
Dept: GASTROENTEROLOGY | Facility: CLINIC | Age: 48
End: 2023-05-01

## 2023-05-01 NOTE — TELEPHONE ENCOUNTER
Left message for pt that there are no female doctors that do bandings  She should call back if she wants to reschedule with Dr Gisel Brown

## 2023-05-01 NOTE — TELEPHONE ENCOUNTER
Patients GI provider:  Dr Jenny Carl    Number to return call: [phone]    Reason for call: Pt calling to cancel her 5/3/23 banding procedure w/ Dr Jenny Carl  Pt would like a call back to schedule w/ a female doctor       Scheduled procedure/appointment date if applicable: n/a

## 2023-05-08 ENCOUNTER — TELEPHONE (OUTPATIENT)
Dept: NEUROLOGY | Facility: CLINIC | Age: 48
End: 2023-05-08

## 2023-05-08 NOTE — TELEPHONE ENCOUNTER
Patient needs a referral sent to [facility] for sight  Dr GANNON referred her to get a visual field test done  Please fax referral to [phone]  Call patient when referral is placed

## 2023-05-09 DIAGNOSIS — E23.7: ICD-10-CM

## 2023-05-09 DIAGNOSIS — E23.7 ABNORMALITY OF PITUITARY GLAND (HCC): Primary | ICD-10-CM

## 2023-05-09 NOTE — TELEPHONE ENCOUNTER
Dr Rito Whittington - If agreeable, please place referral for ophthalmology for pt to get visual field test performed

## 2023-05-09 NOTE — TELEPHONE ENCOUNTER
Referral printed and faxed to number provided  Spoke with pt and informed her of same  Pt appreciative of call back

## 2023-05-09 NOTE — TELEPHONE ENCOUNTER
Detailed message left for patient informing her of previous  Provided the number for [facility]  Mailed a copy of referral to home  Requested a call back with further questions  No

## 2023-05-18 ENCOUNTER — OFFICE VISIT (OUTPATIENT)
Dept: ENDOCRINOLOGY | Facility: CLINIC | Age: 48
End: 2023-05-18

## 2023-05-18 VITALS
HEIGHT: 62 IN | SYSTOLIC BLOOD PRESSURE: 120 MMHG | BODY MASS INDEX: 32.2 KG/M2 | WEIGHT: 175 LBS | DIASTOLIC BLOOD PRESSURE: 70 MMHG | HEART RATE: 76 BPM

## 2023-05-18 DIAGNOSIS — D50.9 IRON DEFICIENCY ANEMIA, UNSPECIFIED IRON DEFICIENCY ANEMIA TYPE: ICD-10-CM

## 2023-05-18 DIAGNOSIS — E23.7 ABNORMALITY OF PITUITARY GLAND (HCC): ICD-10-CM

## 2023-05-18 DIAGNOSIS — D35.2 PITUITARY MICROADENOMA (HCC): Primary | ICD-10-CM

## 2023-05-18 DIAGNOSIS — D64.9 ANEMIA, UNSPECIFIED TYPE: ICD-10-CM

## 2023-05-18 NOTE — PROGRESS NOTES
Established Patient Progress Note       CC: MRI findings suggestive of pituitary microadenoma     Impression & Plan:    Problem List Items Addressed This Visit        Endocrine    Pituitary microadenoma ([address] ) - Primary    Relevant Orders    Insulin-like growth factor 1 (IGF-1) Lab Collect    Cortisol Level, AM Specimen Lab Collect    Follicle stimulating hormone Lab Collect    Luteinizing hormone Lab Collect    T4, free- Lab Collect    ACTH- Lab Collect    Prolactin    Abnormality of pituitary gland (HCC)     Will repeat pituitary function panel, last checked with no abnormality in September 2020  After pituitary function panel, will request patient complete dexamethasone suppression test separate from initial panel  Neurology to repeat MRI in June 2023, per patient  Visual field testing to be evaluated in June 2023  Will follow up in six months, or sooner depending on laboratory findings  Relevant Orders    Insulin-like growth factor 1 (IGF-1) Lab Collect    Cortisol Level, AM Specimen Lab Collect    Follicle stimulating hormone Lab Collect    Luteinizing hormone Lab Collect    T4, free- Lab Collect    ACTH- Lab Collect    Prolactin       Other    Iron deficiency anemia     Primary care recommended iron supplementation in February 2023  Will repeat CBC  Other Visit Diagnoses     Anemia, unspecified type        Relevant Orders    CBC and differential- Lab Collect          Orders Placed This Encounter   Procedures   • Insulin-like growth factor 1 (IGF-1) Lab Collect     Standing Status:   Future     Standing Expiration Date:   5/19/2024   • Cortisol Level, AM Specimen Lab Collect     This is a patient instruction:  This test must be collected between 7-9 AM       Standing Status:   Future     Standing Expiration Date:   7/66/5805   • Follicle stimulating hormone Lab Collect     Standing Status:   Future     Standing Expiration Date:   5/19/2024   • Luteinizing hormone Lab Collect Standing Status:   Future     Standing Expiration Date:   5/19/2024   • T4, free- Lab Collect     Standing Status:   Future     Standing Expiration Date:   5/19/2024   • ACTH- Lab Collect     This is a patient instruction: ACTH should be drawn between 7 AM and 10 AM      Standing Status:   Future     Standing Expiration Date:   5/19/2024   • CBC and differential- Lab Collect     This is a patient instruction: This test is non-fasting  Please drink two glasses of water morning of bloodwork  Standing Status:   Future     Standing Expiration Date:   5/19/2024   • Prolactin     Standing Status:   Future     Standing Expiration Date:   5/19/2024       History of Present Illness:     Cecy Oneill is a 46 y o  female presents for initial consultation for history of findings on MRI suggestive of a pituitary microadenoma dating back to 2020  kM Draper has a history of Bell's Palsy in August 2020 and findings consistent with microadenoma seen on MRI in 2020 and 2021  She recently was seen for re-evaluation by neurology for facial paralysis and glossopharyngeal neuralgia (on gabapentin 100 mg and decadron 2 mg prn)  Brain MRI completed in March 2023 with abnormal pituitary enlargement for age with deviation of the pituitary stalk to the left with abnormal signal and enhancement along the right inferior aspect of the adenohypophysis  Initial pituitary function panel from September 2020 was normal, recently prolactin level checked in February 2023 which was within normal range  Symptoms include: irregular periods, excessive fatigue, nausea, fatigue, dizziness, lightheadedness, and sleep disturbance  Neurology recommending MRI in June 2023 and visual field evaluation which is scheduled for June 2023, as well       Patient Active Problem List   Diagnosis   • Lateral epicondylitis of left elbow   • Double vision   • 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   • Pituitary microadenoma (HCC)   • Positive sm/RNP antibody   • New daily persistent headache   • Excessive crying of adult   • Abnormality of pituitary gland (HCC)   • Disorder of adenohypophysis (HCC)   • Iron deficiency anemia      Past Medical History:   Diagnosis Date   • Abnormal Pap smear of cervix    • Alcohol intoxication ([facility] [address] ) 2021    ED visit   • Allergies     bactrim, IV contrast dye   • Enlarged pituitary gland ([facility] [address] )    • Shoulder pain, right 2019      Past Surgical History:   Procedure Laterality Date   •  SECTION      x 1   • CHOLECYSTECTOMY  2016   • COLONOSCOPY  2019    to be done in 5 years ()   • COLONOSCOPY  2016   • GALLBLADDER SURGERY     • KNEE SURGERY     • MAMMO (HISTORICAL) Bilateral 2022    NORMAL   • TUBAL LIGATION        Family History   Problem Relation Age of Onset   • Rheum arthritis Mother    • Lupus Mother    • Arthritis Mother         Rheumatoid   • Cancer Father    • Heart disease Father         bypass   • Melanoma Father    • Liver cancer Maternal Grandmother    • No Known Problems Sister    • No Known Problems Brother    • No Known Problems Son    • No Known Problems Daughter    • No Known Problems Sister    • No Known Problems Daughter    • No Known Problems Daughter    • No Known Problems Daughter      Social History     Tobacco Use   • Smoking status: Never   • Smokeless tobacco: Never   Substance Use Topics   • Alcohol use: Yes     Comment: Social drinker     Allergies   Allergen Reactions   • Bactrim [Sulfamethoxazole-Trimethoprim] Other (See Comments)     Suicidal   • Contrast Dye [Iodinated Contrast Media] Rash       Current Outpatient Medications:   •  albuterol (Ventolin HFA) 90 mcg/act inhaler, Inhale 2 puffs every 4 (four) hours as needed for wheezing, Disp: 18 g, Rfl: 0  •  dexamethasone (DECADRON) 2 mg tablet, Take 1 tablet (2 mg total) by mouth daily as needed (headache), Disp: 5 tablet, Rfl: 2  •  gabapentin (Neurontin) 100 mg capsule, Take 1 capsule (100 mg total) by mouth daily, "Disp: 30 capsule, Rfl: 0  •  hydrocortisone (ANUSOL-HC) 2 5 % rectal cream, Apply topically 2 (two) times a day, Disp: 28 g, Rfl: 2  •  valACYclovir (VALTREX) 1,000 mg tablet, Take 2 tablets BID, Disp: 4 tablet, Rfl: 5    Review of Systems See HPI    Physical Exam:  Body mass index is 32 01 kg/m²  /70   Pulse 76   Ht 5' 2\" (1 575 m)   Wt 79 4 kg (175 lb)   BMI 32 01 kg/m²    Wt Readings from Last 3 Encounters:   05/18/23 79 4 kg (175 lb)   04/25/23 78 5 kg (173 lb)   04/21/23 80 3 kg (177 lb)       Physical Exam   Vitals reviewed  Constitutional:       Appearance: Normal appearance  Cardiovascular:      Rate and Rhythm: Normal rate and regular rhythm  Pulses: Normal pulses  Pulmonary:      Effort: Pulmonary effort is normal    Skin:     General: Skin is warm and dry  Capillary Refill: Capillary refill takes less than 2 seconds  Neurological:      General: No focal deficit present  Mental Status: She is alert and oriented to person, place, and time  Psychiatric:         Mood and Affect: Mood normal          Behavior: Behavior normal      Labs:     Lab Results   Component Value Date    CREATININE 0 67 02/14/2023    CREATININE 0 80 11/18/2022    CREATININE 0 85 01/25/2022    BUN 11 02/14/2023    K 3 7 02/14/2023     (H) 02/14/2023    CO2 26 02/14/2023     eGFR   Date Value Ref Range Status   02/14/2023 102 ml/min/1 73sq m Final       Lab Results   Component Value Date    HDL 52 02/14/2023    TRIG 112 02/14/2023       Lab Results   Component Value Date    ALT 17 02/14/2023    AST 12 02/14/2023    ALKPHOS 64 02/14/2023     Discussed with the patient and all questioned fully answered  She will call me if any problems arise  Follow-up appointment in 6 months or sooner as needed       Counseled patient on diagnostic results, prognosis, risk and benefit of treatment options, instruction for management, importance of treatment compliance, Risk  factor reduction and " impressions    May 19, [id number]: reviewed pituitary function panel lab work ordered with attending  Will recommend dexamethasone suppression test, as well, after completion of above lab work       Rogers Gay

## 2023-05-19 PROBLEM — D50.9 IRON DEFICIENCY ANEMIA: Status: ACTIVE | Noted: 2023-05-19

## 2023-05-19 NOTE — ASSESSMENT & PLAN NOTE
Will repeat pituitary function panel, last checked with no abnormality in September 2020  After pituitary function panel, will request patient complete dexamethasone suppression test separate from initial panel  Neurology to repeat MRI in June 2023, per patient  Visual field testing to be evaluated in June 2023  Will follow up in six months, or sooner depending on laboratory findings

## 2023-05-23 ENCOUNTER — TELEPHONE (OUTPATIENT)
Dept: ENDOCRINOLOGY | Facility: CLINIC | Age: 48
End: 2023-05-23

## 2023-05-23 NOTE — TELEPHONE ENCOUNTER
It is already ordered, there will be one additional test to be completed after this round (dexamethasone suppression test)

## 2023-05-23 NOTE — TELEPHONE ENCOUNTER
Patient called saying that you were going to talk to Dr Robyn Abad regarding any additional testing you would like done  Should she have the ones ordered done now?

## 2023-05-25 ENCOUNTER — APPOINTMENT (OUTPATIENT)
Dept: LAB | Facility: CLINIC | Age: 48
End: 2023-05-25

## 2023-05-25 DIAGNOSIS — E23.7 ABNORMALITY OF PITUITARY GLAND (HCC): ICD-10-CM

## 2023-05-25 DIAGNOSIS — D35.2 PITUITARY MICROADENOMA (HCC): ICD-10-CM

## 2023-05-25 DIAGNOSIS — D64.9 ANEMIA, UNSPECIFIED TYPE: ICD-10-CM

## 2023-05-25 LAB
BASOPHILS # BLD AUTO: 0.03 THOUSANDS/ÂΜL (ref 0–0.1)
BASOPHILS NFR BLD AUTO: 1 % (ref 0–1)
CORTIS AM PEAK SERPL-MCNC: 14.3 UG/DL (ref 6.7–22.6)
EOSINOPHIL # BLD AUTO: 0.08 THOUSAND/ÂΜL (ref 0–0.61)
EOSINOPHIL NFR BLD AUTO: 2 % (ref 0–6)
ERYTHROCYTE [DISTWIDTH] IN BLOOD BY AUTOMATED COUNT: 14.8 % (ref 11.6–15.1)
FSH SERPL-ACNC: 60 MIU/ML
HCT VFR BLD AUTO: 34.6 % (ref 34.8–46.1)
HGB BLD-MCNC: 10.1 G/DL (ref 11.5–15.4)
IMM GRANULOCYTES # BLD AUTO: 0.01 THOUSAND/UL (ref 0–0.2)
IMM GRANULOCYTES NFR BLD AUTO: 0 % (ref 0–2)
LH SERPL-ACNC: 38.1 MIU/ML
LYMPHOCYTES # BLD AUTO: 2.22 THOUSANDS/ÂΜL (ref 0.6–4.47)
LYMPHOCYTES NFR BLD AUTO: 45 % (ref 14–44)
MCH RBC QN AUTO: 24.1 PG (ref 26.8–34.3)
MCHC RBC AUTO-ENTMCNC: 29.2 G/DL (ref 31.4–37.4)
MCV RBC AUTO: 83 FL (ref 82–98)
MONOCYTES # BLD AUTO: 0.28 THOUSAND/ÂΜL (ref 0.17–1.22)
MONOCYTES NFR BLD AUTO: 6 % (ref 4–12)
NEUTROPHILS # BLD AUTO: 2.32 THOUSANDS/ÂΜL (ref 1.85–7.62)
NEUTS SEG NFR BLD AUTO: 46 % (ref 43–75)
NRBC BLD AUTO-RTO: 0 /100 WBCS
PLATELET # BLD AUTO: 399 THOUSANDS/UL (ref 149–390)
PMV BLD AUTO: 9.6 FL (ref 8.9–12.7)
PROLACTIN SERPL-MCNC: 10.89 NG/ML
RBC # BLD AUTO: 4.19 MILLION/UL (ref 3.81–5.12)
T4 FREE SERPL-MCNC: 1.06 NG/DL (ref 0.61–1.12)
WBC # BLD AUTO: 4.94 THOUSAND/UL (ref 4.31–10.16)

## 2023-05-26 LAB — ACTH PLAS-MCNC: 37.6 PG/ML (ref 7.2–63.3)

## 2023-05-31 ENCOUNTER — TELEPHONE (OUTPATIENT)
Dept: ENDOCRINOLOGY | Facility: CLINIC | Age: 48
End: 2023-05-31

## 2023-05-31 DIAGNOSIS — E23.7 ABNORMALITY OF PITUITARY GLAND (HCC): Primary | ICD-10-CM

## 2023-05-31 RX ORDER — DEXAMETHASONE 1 MG
TABLET ORAL
Qty: 1 TABLET | Refills: 0 | Status: SHIPPED | OUTPATIENT
Start: 2023-05-31

## 2023-05-31 NOTE — TELEPHONE ENCOUNTER
Please let her know that I am waiting for the results of the first panel; however, the next test is a dexamethasone suppression test  She needs to take 1 mg dexamethasone tablet at 11 pm the night before blood work    The blood work to be completed is an am cortisol level which should be completed at 8 am

## 2023-05-31 NOTE — TELEPHONE ENCOUNTER
Patient called looking for another set of labs to be done at a later date    Can you let me know which ones they would be

## 2023-06-02 LAB — IGF-I SERPL-MCNC: 114 NG/ML (ref 70–225)

## 2023-06-09 ENCOUNTER — APPOINTMENT (OUTPATIENT)
Dept: LAB | Facility: CLINIC | Age: 48
End: 2023-06-09
Payer: COMMERCIAL

## 2023-06-09 DIAGNOSIS — E23.7 ABNORMALITY OF PITUITARY GLAND (HCC): ICD-10-CM

## 2023-06-09 DIAGNOSIS — E23.7: ICD-10-CM

## 2023-06-09 LAB — CORTIS AM PEAK SERPL-MCNC: 0.4 UG/DL (ref 6.7–22.6)

## 2023-06-09 PROCEDURE — 82533 TOTAL CORTISOL: CPT

## 2023-06-09 PROCEDURE — 36415 COLL VENOUS BLD VENIPUNCTURE: CPT

## 2023-06-16 ENCOUNTER — TELEPHONE (OUTPATIENT)
Dept: ENDOCRINOLOGY | Facility: CLINIC | Age: 48
End: 2023-06-16

## 2023-11-07 ENCOUNTER — PREP FOR PROCEDURE (OUTPATIENT)
Age: 48
End: 2023-11-07

## 2023-11-07 DIAGNOSIS — Z12.11 COLON CANCER SCREENING: Primary | ICD-10-CM

## 2023-12-11 NOTE — PRE-PROCEDURE INSTRUCTIONS
Pre-Surgery Instructions:   Medication Instructions    albuterol (Ventolin HFA) 90 mcg/act inhaler Uses PRN- OK to take day of surgery    dexamethasone (DECADRON) 2 mg tablet Uses PRN- OK to take day of surgery    hydrocortisone (ANUSOL-HC) 2.5 % rectal cream Uses PRN- OK to take day of surgery    valACYclovir (VALTREX) 1,000 mg tablet Uses PRN- OK to take day of surgery    The patient should have nothing to eat or drink after 11 pm the night before the MRI. The patient may take their medications with a sip of water at least 2 hours prior to their arrival time.  You will receive a call the evening before your MRI appointment with additional instructions.      Please leave your jewelry and valuables at home, wedding rings are the exception.   Please bring your physician order, insurance cards, a form of photo ID and a list of your medications with you. Arrive 15 minutes prior to your appointment time in order to register. Please bring any prior CT or MRI studies of this area that were not performed at a [facility] facility.

## 2023-12-13 ENCOUNTER — CONSULT (OUTPATIENT)
Dept: FAMILY MEDICINE CLINIC | Facility: CLINIC | Age: 48
End: 2023-12-13
Payer: COMMERCIAL

## 2023-12-13 VITALS
DIASTOLIC BLOOD PRESSURE: 80 MMHG | HEART RATE: 72 BPM | OXYGEN SATURATION: 99 % | SYSTOLIC BLOOD PRESSURE: 130 MMHG | HEIGHT: 63 IN | RESPIRATION RATE: 16 BRPM | TEMPERATURE: 98.5 F | WEIGHT: 174 LBS | BODY MASS INDEX: 30.83 KG/M2

## 2023-12-13 DIAGNOSIS — Z01.818 PRE-OPERATIVE CLEARANCE: Primary | ICD-10-CM

## 2023-12-13 PROCEDURE — 99213 OFFICE O/P EST LOW 20 MIN: CPT

## 2023-12-13 NOTE — ASSESSMENT & PLAN NOTE
Pt presents for pre-op exam scheduled for MRI brain pituitary with IV sedation. Pt has had no complications with anesthesia in the past, stable kidney function last CMP 02/2023, will order updated lab. Pt may proceed with procedure as planned without further work-up.

## 2023-12-13 NOTE — PROGRESS NOTES
Elsie Lu 1975 female MRN: [id number]        ASSESSMENT/PLAN  Problem List Items Addressed This Visit          Other    Pre-operative clearance - Primary     Pt presents for pre-op exam scheduled for MRI brain pituitary with IV sedation. Pt has had no complications with anesthesia in the past, stable kidney function last CMP 02/2023, will order updated lab. Pt may proceed with procedure as planned without further work-up. Relevant Orders    Comprehensive metabolic panel       High Risk Surgery: no  CAD: no  CHF: no  CVD: no  DM2 on insulin: no  Cr>2: no        Elsie Lu is undergoing a Minimal risk surgery. She is at [facility] for major adverse cardiac event (MACE). She may proceed with surgery as planned without further workup. SUBJECTIVE  CC: Pre-op Exam      HPI:  Elsie Lu is a 46 y.o. female who is planning to undergo MRI of brain  under IV sedation  on 01/10/2024. Patient has not had complications with anesthesia in the past.  Functional status: Independent     Review of Systems   Constitutional:  Negative for activity change, appetite change, chills, diaphoresis, fatigue and fever. HENT:  Negative for congestion, drooling, ear pain, hearing loss, nosebleeds, postnasal drip, rhinorrhea, sinus pressure, sinus pain, sore throat, tinnitus, trouble swallowing and voice change. Eyes:  Negative for photophobia, pain, discharge, redness and visual disturbance. Respiratory:  Negative for cough, chest tightness, shortness of breath and wheezing. Cardiovascular:  Negative for chest pain, palpitations and leg swelling. Gastrointestinal:  Positive for nausea. Negative for abdominal distention, abdominal pain, blood in stool, constipation, diarrhea, rectal pain and vomiting. Endocrine: Negative for cold intolerance, heat intolerance, polydipsia, polyphagia and polyuria.    Genitourinary:  Negative for decreased urine volume, difficulty urinating, dysuria, flank pain, genital sores, hematuria, menstrual problem, pelvic pain, urgency, vaginal discharge and vaginal pain. Musculoskeletal:  Negative for arthralgias, back pain, gait problem, myalgias, neck pain and neck stiffness. Skin:  Negative for color change, rash and wound. Allergic/Immunologic: Negative for environmental allergies, food allergies and immunocompromised state. Neurological:  Positive for dizziness. Negative for tremors, seizures, syncope, facial asymmetry, weakness, light-headedness and numbness. Hematological:  Negative for adenopathy. Psychiatric/Behavioral:  Negative for agitation, behavioral problems, confusion, decreased concentration, dysphoric mood, hallucinations, self-injury, sleep disturbance and suicidal ideas. The patient is not nervous/anxious and is not hyperactive. All other systems reviewed and are negative.         Historical Information   The patient history was reviewed as follows:    Past Medical History:   Diagnosis Date    Abnormal Pap smear of cervix     Alcohol intoxication ([address]) 2021    ED visit    Allergies     bactrim, IV contrast dye    Enlarged pituitary gland ([address])     Shoulder pain, right 2019     Past Surgical History:   Procedure Laterality Date     SECTION      x 1    CHOLECYSTECTOMY  2016    COLONOSCOPY  2019    to be done in 5 years ()    COLONOSCOPY  2016    GALLBLADDER SURGERY      KNEE SURGERY      MAMMO (HISTORICAL) Bilateral 2022    NORMAL    TUBAL LIGATION       Family History   Problem Relation Age of Onset    Rheum arthritis Mother     Lupus Mother     Arthritis Mother         Rheumatoid    Cancer Father     Heart disease Father         bypass    Melanoma Father     Liver cancer Maternal Grandmother     No Known Problems Sister     No Known Problems Brother     No Known Problems Son     No Known Problems Daughter     No Known Problems Sister     No Known Problems Daughter     No Known Problems Daughter     No Known Problems Daughter       Social History       Medications:     Current Outpatient Medications:     albuterol (Ventolin HFA) 90 mcg/act inhaler, Inhale 2 puffs every 4 (four) hours as needed for wheezing (Patient not taking: Reported on 12/13/2023), Disp: 18 g, Rfl: 0    dexamethasone (DECADRON) 1 mg tablet, Once at 11PM night before AM cortisol test (Patient not taking: Reported on 12/13/2023), Disp: 1 tablet, Rfl: 0    dexamethasone (DECADRON) 2 mg tablet, Take 1 tablet (2 mg total) by mouth daily as needed (headache) (Patient not taking: Reported on 12/13/2023), Disp: 5 tablet, Rfl: 2    gabapentin (Neurontin) 100 mg capsule, Take 1 capsule (100 mg total) by mouth daily (Patient not taking: Reported on 12/11/2023), Disp: 30 capsule, Rfl: 0    hydrocortisone (ANUSOL-HC) 2.5 % rectal cream, Apply topically 2 (two) times a day (Patient not taking: Reported on 12/13/2023), Disp: 28 g, Rfl: 2    valACYclovir (VALTREX) 1,000 mg tablet, Take 2 tablets BID (Patient not taking: Reported on 12/13/2023), Disp: 4 tablet, Rfl: 5  Allergies   Allergen Reactions    Bactrim [Sulfamethoxazole-Trimethoprim] Other (See Comments)     Suicidal    Contrast Dye [Iodinated Contrast Media] Rash       OBJECTIVE    Vitals:   Vitals:    12/13/23 1049   BP: 130/80   BP Location: Left arm   Patient Position: Sitting   Cuff Size: Standard   Pulse: 72   Resp: 16   Temp: 98.5 °F (36.9 °C)   TempSrc: Tympanic   SpO2: 99%   Weight: 78.9 kg (174 lb)   Height: 5' 3" (1.6 m)           Physical Exam  Vitals and nursing note reviewed. Constitutional:       General: She is not in acute distress. Appearance: Normal appearance. She is normal weight. She is not ill-appearing or toxic-appearing. HENT:      Head: Normocephalic and atraumatic. Right Ear: Tympanic membrane, ear canal and external ear normal. There is no impacted cerumen. Left Ear: Tympanic membrane, ear canal and external ear normal. There is no impacted cerumen.       Nose: Nose normal. No congestion or rhinorrhea. Mouth/Throat:      Mouth: Mucous membranes are moist.      Pharynx: Oropharynx is clear. No oropharyngeal exudate or posterior oropharyngeal erythema. Eyes:      General: No scleral icterus. Right eye: No discharge. Left eye: No discharge. Extraocular Movements: Extraocular movements intact. Conjunctiva/sclera: Conjunctivae normal.      Pupils: Pupils are equal, round, and reactive to light. Neck:      Vascular: No carotid bruit. Cardiovascular:      Rate and Rhythm: Normal rate and regular rhythm. Pulses: Normal pulses. Heart sounds: Normal heart sounds. No murmur heard. Pulmonary:      Effort: Pulmonary effort is normal. No respiratory distress. Breath sounds: Normal breath sounds. No stridor. No wheezing or rhonchi. Chest:      Chest wall: No tenderness. Abdominal:      General: Bowel sounds are normal. There is no distension. Palpations: Abdomen is soft. There is no mass. Tenderness: There is no abdominal tenderness. There is no right CVA tenderness, left CVA tenderness, guarding or rebound. Hernia: No hernia is present. Musculoskeletal:         General: No swelling or tenderness. Normal range of motion. Cervical back: Normal range of motion. No rigidity or tenderness. Right lower leg: No edema. Left lower leg: No edema. Lymphadenopathy:      Cervical: No cervical adenopathy. Skin:     General: Skin is warm. Capillary Refill: Capillary refill takes less than 2 seconds. Coloration: Skin is not jaundiced. Findings: No bruising, erythema or rash. Neurological:      General: No focal deficit present. Mental Status: She is alert and oriented to person, place, and time. Cranial Nerves: No cranial nerve deficit. Sensory: No sensory deficit. Motor: No weakness, tremor, atrophy, abnormal muscle tone, seizure activity or pronator drift.       Coordination: Coordination is intact. Romberg sign negative. Coordination normal. Finger-Nose-Finger Test and Heel to [facility] Test normal. Rapid alternating movements normal.      Gait: Gait normal.      Deep Tendon Reflexes: Reflexes normal.   Psychiatric:         Mood and Affect: Mood normal.         Behavior: Behavior normal.         Thought Content:  Thought content normal.         Judgment: Judgment normal.                Mario Alberto Lyn, [address] [place] [address]   12/13/2023  11:14 AM

## 2023-12-14 ENCOUNTER — APPOINTMENT (OUTPATIENT)
Dept: LAB | Facility: CLINIC | Age: 48
End: 2023-12-14
Payer: COMMERCIAL

## 2023-12-14 DIAGNOSIS — Z01.818 PRE-OPERATIVE CLEARANCE: ICD-10-CM

## 2023-12-14 LAB
ALBUMIN SERPL BCP-MCNC: 4.3 G/DL (ref 3.5–5)
ALP SERPL-CCNC: 74 U/L (ref 34–104)
ALT SERPL W P-5'-P-CCNC: 13 U/L (ref 7–52)
ANION GAP SERPL CALCULATED.3IONS-SCNC: 10 MMOL/L
AST SERPL W P-5'-P-CCNC: 19 U/L (ref 13–39)
BILIRUB SERPL-MCNC: 0.31 MG/DL (ref 0.2–1)
BUN SERPL-MCNC: 10 MG/DL (ref 5–25)
CALCIUM SERPL-MCNC: 9.6 MG/DL (ref 8.4–10.2)
CHLORIDE SERPL-SCNC: 102 MMOL/L (ref 96–108)
CO2 SERPL-SCNC: 26 MMOL/L (ref 21–32)
CREAT SERPL-MCNC: 0.59 MG/DL (ref 0.6–1.3)
GFR SERPL CREATININE-BSD FRML MDRD: 107 ML/MIN/1.73SQ M
GLUCOSE P FAST SERPL-MCNC: 92 MG/DL (ref 65–99)
POTASSIUM SERPL-SCNC: 3.8 MMOL/L (ref 3.5–5.3)
PROT SERPL-MCNC: 8.1 G/DL (ref 6.4–8.4)
SODIUM SERPL-SCNC: 138 MMOL/L (ref 135–147)

## 2023-12-14 PROCEDURE — 80053 COMPREHEN METABOLIC PANEL: CPT

## 2023-12-14 PROCEDURE — 36415 COLL VENOUS BLD VENIPUNCTURE: CPT

## 2023-12-18 ENCOUNTER — TELEPHONE (OUTPATIENT)
Dept: NEUROLOGY | Facility: CLINIC | Age: 48
End: 2023-12-18

## 2024-01-10 ENCOUNTER — HOSPITAL ENCOUNTER (OUTPATIENT)
Dept: RADIOLOGY | Facility: HOSPITAL | Age: 49
Discharge: HOME | End: 2024-01-10
Attending: PSYCHIATRY & NEUROLOGY
Payer: COMMERCIAL

## 2024-01-10 ENCOUNTER — ANESTHESIA (OUTPATIENT)
Dept: RADIOLOGY | Facility: HOSPITAL | Age: 49
End: 2024-01-10

## 2024-01-10 ENCOUNTER — ANESTHESIA EVENT (OUTPATIENT)
Dept: RADIOLOGY | Facility: HOSPITAL | Age: 49
End: 2024-01-10

## 2024-01-10 VITALS
OXYGEN SATURATION: 94 % | BODY MASS INDEX: 32.2 KG/M2 | TEMPERATURE: 96 F | WEIGHT: 175 LBS | RESPIRATION RATE: 16 BRPM | DIASTOLIC BLOOD PRESSURE: 72 MMHG | SYSTOLIC BLOOD PRESSURE: 109 MMHG | HEIGHT: 62 IN | HEART RATE: 74 BPM

## 2024-01-10 DIAGNOSIS — D35.2 PITUITARY MICROADENOMA (HCC): ICD-10-CM

## 2024-01-10 DIAGNOSIS — E23.7: ICD-10-CM

## 2024-01-10 LAB
EXT PREGNANCY TEST URINE: NEGATIVE
EXT. CONTROL: NORMAL

## 2024-01-10 PROCEDURE — 70553 MRI BRAIN STEM W/O & W/DYE: CPT

## 2024-01-10 PROCEDURE — 81025 URINE PREGNANCY TEST: CPT | Performed by: ANESTHESIOLOGY

## 2024-01-10 PROCEDURE — A9585 GADOBUTROL INJECTION: HCPCS | Performed by: PSYCHIATRY & NEUROLOGY

## 2024-01-10 PROCEDURE — G1004 CDSM NDSC: HCPCS

## 2024-01-10 RX ORDER — METOCLOPRAMIDE HYDROCHLORIDE 5 MG/ML
10 INJECTION INTRAMUSCULAR; INTRAVENOUS ONCE AS NEEDED
Status: DISCONTINUED | OUTPATIENT
Start: 2024-01-10 | End: 2024-01-11 | Stop reason: HOSPADM

## 2024-01-10 RX ORDER — ONDANSETRON 2 MG/ML
INJECTION INTRAMUSCULAR; INTRAVENOUS AS NEEDED
Status: DISCONTINUED | OUTPATIENT
Start: 2024-01-10 | End: 2024-01-10

## 2024-01-10 RX ORDER — SODIUM CHLORIDE, SODIUM LACTATE, POTASSIUM CHLORIDE, CALCIUM CHLORIDE 600; 310; 30; 20 MG/100ML; MG/100ML; MG/100ML; MG/100ML
INJECTION, SOLUTION INTRAVENOUS CONTINUOUS PRN
Status: DISCONTINUED | OUTPATIENT
Start: 2024-01-10 | End: 2024-01-10

## 2024-01-10 RX ORDER — LIDOCAINE HYDROCHLORIDE 10 MG/ML
INJECTION, SOLUTION EPIDURAL; INFILTRATION; INTRACAUDAL; PERINEURAL AS NEEDED
Status: DISCONTINUED | OUTPATIENT
Start: 2024-01-10 | End: 2024-01-10

## 2024-01-10 RX ORDER — PHENYLEPHRINE HCL IN 0.9% NACL 1 MG/10 ML
SYRINGE (ML) INTRAVENOUS AS NEEDED
Status: DISCONTINUED | OUTPATIENT
Start: 2024-01-10 | End: 2024-01-10

## 2024-01-10 RX ORDER — GADOBUTROL 604.72 MG/ML
8 INJECTION INTRAVENOUS
Status: COMPLETED | OUTPATIENT
Start: 2024-01-10 | End: 2024-01-10

## 2024-01-10 RX ORDER — DEXAMETHASONE SODIUM PHOSPHATE 10 MG/ML
INJECTION, SOLUTION INTRAMUSCULAR; INTRAVENOUS AS NEEDED
Status: DISCONTINUED | OUTPATIENT
Start: 2024-01-10 | End: 2024-01-10

## 2024-01-10 RX ORDER — PROPOFOL 10 MG/ML
INJECTION, EMULSION INTRAVENOUS AS NEEDED
Status: DISCONTINUED | OUTPATIENT
Start: 2024-01-10 | End: 2024-01-10

## 2024-01-10 RX ORDER — SODIUM CHLORIDE, SODIUM LACTATE, POTASSIUM CHLORIDE, CALCIUM CHLORIDE 600; 310; 30; 20 MG/100ML; MG/100ML; MG/100ML; MG/100ML
125 INJECTION, SOLUTION INTRAVENOUS CONTINUOUS
Status: DISCONTINUED | OUTPATIENT
Start: 2024-01-10 | End: 2024-01-11 | Stop reason: HOSPADM

## 2024-01-10 RX ORDER — SODIUM CHLORIDE, SODIUM LACTATE, POTASSIUM CHLORIDE, CALCIUM CHLORIDE 600; 310; 30; 20 MG/100ML; MG/100ML; MG/100ML; MG/100ML
125 INJECTION, SOLUTION INTRAVENOUS CONTINUOUS
Status: DISPENSED | OUTPATIENT
Start: 2024-01-10 | End: 2024-01-10

## 2024-01-10 RX ADMIN — SODIUM CHLORIDE, SODIUM LACTATE, POTASSIUM CHLORIDE, AND CALCIUM CHLORIDE: .6; .31; .03; .02 INJECTION, SOLUTION INTRAVENOUS at 08:10

## 2024-01-10 RX ADMIN — SODIUM CHLORIDE, SODIUM LACTATE, POTASSIUM CHLORIDE, AND CALCIUM CHLORIDE 125 ML/HR: .6; .31; .03; .02 INJECTION, SOLUTION INTRAVENOUS at 07:35

## 2024-01-10 RX ADMIN — LIDOCAINE HYDROCHLORIDE 2 ML: 10 INJECTION, SOLUTION EPIDURAL; INFILTRATION; INTRACAUDAL; PERINEURAL at 08:13

## 2024-01-10 RX ADMIN — Medication 150 MCG: at 08:52

## 2024-01-10 RX ADMIN — PROPOFOL 180 MG: 10 INJECTION, EMULSION INTRAVENOUS at 08:13

## 2024-01-10 RX ADMIN — GADOBUTROL 8 ML: 604.72 INJECTION INTRAVENOUS at 08:51

## 2024-01-10 RX ADMIN — ONDANSETRON 4 MG: 2 INJECTION INTRAMUSCULAR; INTRAVENOUS at 08:10

## 2024-01-10 RX ADMIN — DEXAMETHASONE SODIUM PHOSPHATE 10 MG: 10 INJECTION, SOLUTION INTRAMUSCULAR; INTRAVENOUS at 08:10

## 2024-01-10 NOTE — ANESTHESIA POSTPROCEDURE EVALUATION
Post-Op Assessment Note    CV Status:  Stable  Pain Score: 0    Pain management: adequate       Mental Status:  Arousable   Hydration Status:  Euvolemic   PONV Controlled:  Controlled   Airway Patency:  Patent     Post Op Vitals Reviewed: Yes      Staff: Anesthesiologist, CRNA               BP   110/69   Temp   96.4   Pulse  95   Resp      SpO2   99 room air

## 2024-01-10 NOTE — ANESTHESIA PREPROCEDURE EVALUATION
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        Sinus tachycardia  Otherwise normal ECG  No previous ECGs available  Confirmed by Alhaji Kruger ([id number]) on 11/20/2022 2:47:33 PM           Component  Ref Range & Units 5/25/23 0724 2/14/23 0703 11/18/22 1150 1/25/22 0711 12/23/20 0921 8/29/20 2014   WBC  4.31 - 10.16 Thousand/uL 4.94 5.67 7.06 6.17 4.83 5.92   RBC  3.81 - 5.12 Million/uL 4.19 3.91 4.33 4.49 4.09 4.14   Hemoglobin  11.5 - 15.4 g/dL 10.1 Low  9.9 Low  11.8 12.2 11.2 Low  11.7   Hematocrit  34.8 - 46.1 % 34.6 Low  33.0 Low  37.0 40.0 36.0 37.2   MCV  82 - 98 fL 83 84 86 89 88 90   MCH  26.8 - 34.3 pg 24.1 Low  25.3 Low  27.3 27.2 27.4 28.3   MCHC  31.4 - 37.4 g/dL 29.2 Low  30.0 Low  31.9 30.5 Low  31.1 Low  31.5   RDW  11.6 - 15.1 % 14.8 14.1 13.6 13.2 13.2 12.6   MPV  8.9 - 12.7 fL 9.6 9.7 9.2 9.8 9.6 9.4   Platelets  149 - 390 Thousands/uL 399 High  380 290 339 310 290              Physical Exam    Airway    Mallampati score: I  TM Distance: >3 FB  Neck ROM: full     Dental       Cardiovascular      Pulmonary      Other Findings  post-pubertal.      Anesthesia Plan  ASA Score- 2     Anesthesia Type- general with ASA Monitors.         Additional Monitors:     Airway Plan: LMA.           Plan Factors-    Chart reviewed. EKG reviewed.  Existing labs reviewed. Patient summary reviewed.    Patient is not a current smoker.  Patient did not smoke on day of surgery.            Induction-     Postoperative Plan- . Planned trial extubation    Informed Consent- Anesthetic plan and risks discussed with patient.  I  personally reviewed this patient with the CRNA. Discussed and agreed on the Anesthesia Plan with the CRNA..

## 2024-01-10 NOTE — NURSING NOTE
MRI brain pituitary w/without contrast complete. Pt tolerated well.     VSS post procedure. Pt alert and oriented on room air.   No complaints or visible signs of distress.    Report given to Nataliya Maxwell [facility] RN.  Transported back to [facility].

## 2024-01-15 ENCOUNTER — OFFICE VISIT (OUTPATIENT)
Dept: NEUROLOGY | Facility: CLINIC | Age: 49
End: 2024-01-15
Payer: COMMERCIAL

## 2024-01-15 VITALS
TEMPERATURE: 97.6 F | DIASTOLIC BLOOD PRESSURE: 80 MMHG | SYSTOLIC BLOOD PRESSURE: 100 MMHG | BODY MASS INDEX: 32.76 KG/M2 | WEIGHT: 178 LBS | OXYGEN SATURATION: 97 % | HEIGHT: 62 IN | HEART RATE: 82 BPM

## 2024-01-15 DIAGNOSIS — R55 NEAR SYNCOPE: Primary | ICD-10-CM

## 2024-01-15 DIAGNOSIS — R07.89 CHEST TIGHTNESS: ICD-10-CM

## 2024-01-15 DIAGNOSIS — E23.7: ICD-10-CM

## 2024-01-15 PROCEDURE — 99215 OFFICE O/P EST HI 40 MIN: CPT | Performed by: PSYCHIATRY & NEUROLOGY

## 2024-01-15 NOTE — PROGRESS NOTES
Patient ID: Lolis Huber is a 48 y.o. female.    Assessment/Plan:           Problem List Items Addressed This Visit          Endocrine    Disorder of adenohypophysis (HCC)     Other Visit Diagnoses       Near syncope    -  Primary    Relevant Orders    Holter monitor    Glucose MARY 2HR 75GM Nonpreg    Lipid Panel with Direct LDL reflex    Echo complete w/ contrast if indicated    Chest tightness        Relevant Orders    Echo complete w/ contrast if indicated           Mrs. Huber has presented to [facility] multiple sclerosis [place] for follow-up on abnormal radiographic findings of pituitary gland.  Patient has known pituitary microadenoma with deviation of the pituitary stalk to the left and abnormal signal with enlargement along the inferior aspect of adenohypophysis with imaging from 2021 compared to MRIs done in March 2023, patient also completed MRI in January 2024 with no significant changes been advised.    Case was personally reviewed with Dr.Schoenberger , no significant changes on brain MRI completed on January 10, 2020 for being noted, formal report will follow.    Patient completed serologic workup in June 2023 as patient has normal cortisol/prolactin/ACTH/T4/LT/FSH/IGF-1.    Patient describes no significant visual dysfunction but blurriness in nasal area in the right eye and temporal area in the left eye, after emoji evaluation been pending.    Patient also had sharp headaches but no migraine headaches, nonconcerning.    Patient describes 2 events of what I believe near syncopal events when patient has lightheadedness with losing concentration and bladder/bowel loss while driving and walking.  Patient did not have any changes position of her body.  Concern for syncope/near syncope.  Patient will be offered Holter monitoring and 2D echo as well as start taking aspirin 81 mg chewable daily basis till she has results back.  Patient is to follow-up with primary care team within several weeks to review  results and provide additional relation of cardiac function if clinically indicated.  No concern for seizures.    Patient was also offered lipid panel in addition to glucose tolerance test to rule out bouts of postprandial hypoglycemia.    Patient is to follow [facility] neurology within 10-12 months.    Subjective:    HPI    Mrs. Huber has presented to [facility] multiple sclerosis center with her  for follow-up on abnormal brain MRI findings.    Patient has been following with ophthalmology with evaluation has been pending.  Patient has a blurriness but no vision loss or visual field cut.    Patient established with endocrinology with comprehensive serological workup been completed.    Patient describes instances where she has chest pressure at the same time she is gasping for air events lasted for few minutes.  Patient had previous events which were similar in clinical presentation as patient had hypoxemia.  Patient need to take deep breaths to overcome this heaviness in her chest.  Patient had 2 instances where she almost lost consciousness described as feeling dizzy and lightheaded with losing control bowel and bladder with nausea.  First event took place during walking second when she was driving.  Patient did not seek any medical attention.     MRI brain 1/2024: Stable mildly prominent pituitary gland. Stable 5 mm T1 hyperintense, hypoenhancing focus in the posterior pituitary gland. See differential above. No change since 2020.   This could represent the posterior hypophysis but is asymmetric to the right and small Rathke cleft cyst a consideration.. Microadenoma lower on the differential.. Finding is stable since 2020. Parasellar and suprasellar structures are   unremarkable.     Brain MRI were repeated in March 2023 with following findings has been described : pituitary gland considered abnormally enlarged for age with deviation of the pituitary stalk to the left and abnormal signal and  enhancement along the right inferior aspect of the adenohypophysis.            Patient is to follow-up with optometrist for visual field ( VF) evaluation.    The following portions of the patient's history were reviewed and updated as appropriate: She  has a past medical history of Abnormal Pap smear of cervix, Alcohol intoxication (HCC) (2021), Allergies, Enlarged pituitary gland (HCC), and Shoulder pain, right (2019).  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  Her family history includes Arthritis in her mother; Cancer in her father; Heart disease in her father; Liver cancer in her maternal grandmother; Lupus in her mother; Melanoma in her father; No Known Problems in her brother, daughter, daughter, daughter, daughter, sister, sister, and son; Rheum arthritis in her mother.  She  reports that she has never smoked. She has never used smokeless tobacco. She reports current alcohol use. She reports that she does not use drugs.  Current Outpatient Medications   Medication Sig Dispense Refill    albuterol (Ventolin HFA) 90 mcg/act inhaler Inhale 2 puffs every 4 (four) hours as needed for wheezing (Patient not taking: Reported on 2023) 18 g 0    dexamethasone (DECADRON) 1 mg tablet  "Once at 11PM night before AM cortisol test (Patient not taking: Reported on 12/13/2023) 1 tablet 0    dexamethasone (DECADRON) 2 mg tablet Take 1 tablet (2 mg total) by mouth daily as needed (headache) (Patient not taking: Reported on 12/13/2023) 5 tablet 2    gabapentin (Neurontin) 100 mg capsule Take 1 capsule (100 mg total) by mouth daily (Patient not taking: Reported on 12/11/2023) 30 capsule 0    hydrocortisone (ANUSOL-HC) 2.5 % rectal cream Apply topically 2 (two) times a day (Patient not taking: Reported on 12/13/2023) 28 g 2    valACYclovir (VALTREX) 1,000 mg tablet Take 2 tablets BID (Patient not taking: Reported on 12/13/2023) 4 tablet 5     No current facility-administered medications for this visit.     Current Outpatient Medications on File Prior to Visit   Medication Sig    albuterol (Ventolin HFA) 90 mcg/act inhaler Inhale 2 puffs every 4 (four) hours as needed for wheezing (Patient not taking: Reported on 12/13/2023)    dexamethasone (DECADRON) 1 mg tablet Once at 11PM night before AM cortisol test (Patient not taking: Reported on 12/13/2023)    dexamethasone (DECADRON) 2 mg tablet Take 1 tablet (2 mg total) by mouth daily as needed (headache) (Patient not taking: Reported on 12/13/2023)    gabapentin (Neurontin) 100 mg capsule Take 1 capsule (100 mg total) by mouth daily (Patient not taking: Reported on 12/11/2023)    hydrocortisone (ANUSOL-HC) 2.5 % rectal cream Apply topically 2 (two) times a day (Patient not taking: Reported on 12/13/2023)    valACYclovir (VALTREX) 1,000 mg tablet Take 2 tablets BID (Patient not taking: Reported on 12/13/2023)     No current facility-administered medications on file prior to visit.     She is allergic to bactrim [sulfamethoxazole-trimethoprim] and contrast dye [iodinated contrast media]..         Objective:    Blood pressure 100/80, pulse 82, temperature 97.6 °F (36.4 °C), temperature source Temporal, height 5' 2\" (1.575 m), weight 80.7 kg (178 lb), SpO2 " 97%.    Physical Exam    Neurological Exam    CONSTITUTIONAL: NAD, pleasant. NECK: supple, no lymphadenopathy, no thyromegaly, no JVD. CARDIOVASCULAR: RRR, normal S1S2, no murmurs, no rubs. RESP: clear to auscultation bilaterally, no wheezes/rhonchi/rales. ABDOMEN: soft, non tender, non distended. SKIN: no rash or skin lesions. EXTREMITIES: no edema, pulses 2+bilaterally. PSYCH: appropriate mood and affect  NEUROLOGIC COMPREHENSIVE EXAM: Patient is oriented to person, place and time, NAD; appropriate affect. CN II, III, IV, V, VI, VII,VIII,IX,X,XI-XII intact with EOMI, PERRLA, OKN intact, VF with more blurriness in the nasal portion of right eye and temporal area left eye, fundi poorly visualized secondary to pupillary constriction; symmetric face noted. Motor: 5/5 UE/LE bilateral symmetric; Sensory: intact to light touch and pinprick bilaterally; normal vibration sensation feet bilaterally; Coordination within normal limits on FTN and CHRISTI testing; DTR: 2/4 through, no Babinski, no clonus. Tandem gait is intact. Romberg: absent.  ROS:    Review of Systems

## 2024-01-22 ENCOUNTER — APPOINTMENT (OUTPATIENT)
Dept: LAB | Facility: CLINIC | Age: 49
End: 2024-01-22
Payer: COMMERCIAL

## 2024-01-22 DIAGNOSIS — R55 NEAR SYNCOPE: ICD-10-CM

## 2024-01-22 LAB
CHOLEST SERPL-MCNC: 219 MG/DL
GLUCOSE P FAST SERPL-MCNC: 97 MG/DL (ref 65–99)
HDLC SERPL-MCNC: 70 MG/DL
LDLC SERPL CALC-MCNC: 123 MG/DL (ref 0–100)
TRIGL SERPL-MCNC: 132 MG/DL

## 2024-01-22 PROCEDURE — 80061 LIPID PANEL: CPT

## 2024-01-22 PROCEDURE — 36415 COLL VENOUS BLD VENIPUNCTURE: CPT

## 2024-01-22 PROCEDURE — 82947 ASSAY GLUCOSE BLOOD QUANT: CPT

## 2024-01-24 ENCOUNTER — TELEPHONE (OUTPATIENT)
Dept: NEUROLOGY | Facility: CLINIC | Age: 49
End: 2024-01-24

## 2024-01-24 NOTE — TELEPHONE ENCOUNTER
Received VM transcription from 1/23/24, 10:53 AM:    Hi, this is Lolis Huber. Call back number is [phone]. I went to do a 2 hour glucose test that Dr. GANNON had asked me to get done. And they're supposed to send you guys some messages. It didn't go good because it mess up my stomach and I end up throwing up everything and was very light headed and stuff. So they said that there is an alternative or something. I don't know what else you need me to do juan luis they could not proceed with drawing the blood after giving me to drink. And they said to reach out to you guys. And they also were gonna put in their note to send to you also. So if you can give me a call back, [phone]. See what is the next step. Thank you, rai.  ---------------------------    Dr. TERESSA Mansfield Pt was unable to complete Glucose tolerance test. Please advise.

## 2024-01-24 NOTE — TELEPHONE ENCOUNTER
Spoke with pt and advised her that Dr. GANNON was able to review her message and informed her of Dr. GANNON's response. Let pt know not able to find note from lab regarding test.  Pt verbalizes understanding. Nothing further at this time.

## 2024-01-25 ENCOUNTER — HOSPITAL ENCOUNTER (OUTPATIENT)
Dept: NON INVASIVE DIAGNOSTICS | Facility: CLINIC | Age: 49
Discharge: HOME | End: 2024-01-25
Payer: COMMERCIAL

## 2024-01-25 DIAGNOSIS — R55 NEAR SYNCOPE: ICD-10-CM

## 2024-01-25 PROCEDURE — 93226 XTRNL ECG REC<48 HR SCAN A/R: CPT

## 2024-01-25 PROCEDURE — 93225 XTRNL ECG REC<48 HRS REC: CPT

## 2024-01-29 ENCOUNTER — HOSPITAL ENCOUNTER (OUTPATIENT)
Dept: NON INVASIVE DIAGNOSTICS | Facility: CLINIC | Age: 49
Discharge: HOME | End: 2024-01-29
Payer: COMMERCIAL

## 2024-01-29 VITALS
WEIGHT: 178 LBS | BODY MASS INDEX: 32.76 KG/M2 | HEIGHT: 62 IN | SYSTOLIC BLOOD PRESSURE: 100 MMHG | DIASTOLIC BLOOD PRESSURE: 80 MMHG | HEART RATE: 82 BPM

## 2024-01-29 DIAGNOSIS — R07.89 CHEST TIGHTNESS: ICD-10-CM

## 2024-01-29 DIAGNOSIS — R55 NEAR SYNCOPE: ICD-10-CM

## 2024-01-29 LAB
AORTIC ROOT: 2.5 CM
APICAL FOUR CHAMBER EJECTION FRACTION: 51 %
ASCENDING AORTA: 2.8 CM
BSA FOR ECHO PROCEDURE: 1.82 M2
E WAVE DECELERATION TIME: 166 MS
E/A RATIO: 1.39
FRACTIONAL SHORTENING: 29 (ref 28–44)
INTERVENTRICULAR SEPTUM IN DIASTOLE (PARASTERNAL SHORT AXIS VIEW): 0.7 CM
INTERVENTRICULAR SEPTUM: 0.7 CM (ref 0.6–1.1)
LAAS-AP2: 16 CM2
LAAS-AP4: 16.5 CM2
LEFT ATRIUM SIZE: 3.4 CM
LEFT ATRIUM VOLUME (MOD BIPLANE): 45 ML
LEFT ATRIUM VOLUME INDEX (MOD BIPLANE): 24.7 ML/M2
LEFT INTERNAL DIMENSION IN SYSTOLE: 3.2 CM (ref 2.1–4)
LEFT VENTRICULAR INTERNAL DIMENSION IN DIASTOLE: 4.5 CM (ref 3.5–6)
LEFT VENTRICULAR POSTERIOR WALL IN END DIASTOLE: 0.7 CM
LEFT VENTRICULAR STROKE VOLUME: 49 ML
LVSV (TEICH): 49 ML
MV E'TISSUE VEL-SEP: 9 CM/S
MV PEAK A VEL: 0.56 M/S
MV PEAK E VEL: 78 CM/S
MV STENOSIS PRESSURE HALF TIME: 48 MS
MV VALVE AREA P 1/2 METHOD: 4.58
RIGHT ATRIAL 2D VOLUME: 29 ML
RIGHT ATRIUM AREA SYSTOLE A4C: 13.2 CM2
RIGHT VENTRICLE ID DIMENSION: 3.5 CM
SL CV LEFT ATRIUM LENGTH A2C: 4.8 CM
SL CV LV EF: 55
SL CV PED ECHO LEFT VENTRICLE DIASTOLIC VOLUME (MOD BIPLANE) 2D: 90 ML
SL CV PED ECHO LEFT VENTRICLE SYSTOLIC VOLUME (MOD BIPLANE) 2D: 41 ML
TRICUSPID ANNULAR PLANE SYSTOLIC EXCURSION: 1.8 CM

## 2024-01-29 PROCEDURE — 93306 TTE W/DOPPLER COMPLETE: CPT | Performed by: INTERNAL MEDICINE

## 2024-01-29 PROCEDURE — 93306 TTE W/DOPPLER COMPLETE: CPT

## 2024-01-29 RX ADMIN — PERFLUTREN 0.6 ML/MIN: 6.52 INJECTION, SUSPENSION INTRAVENOUS at 12:05

## 2024-01-30 PROCEDURE — 93227 XTRNL ECG REC<48 HR R&I: CPT | Performed by: INTERNAL MEDICINE

## 2024-02-06 ENCOUNTER — OFFICE VISIT (OUTPATIENT)
Dept: GASTROENTEROLOGY | Facility: AMBULARY SURGERY CENTER | Age: 49
End: 2024-02-06
Payer: COMMERCIAL

## 2024-02-06 ENCOUNTER — APPOINTMENT (OUTPATIENT)
Dept: LAB | Facility: AMBULARY SURGERY CENTER | Age: 49
End: 2024-02-06
Payer: COMMERCIAL

## 2024-02-06 VITALS
SYSTOLIC BLOOD PRESSURE: 128 MMHG | BODY MASS INDEX: 33.2 KG/M2 | DIASTOLIC BLOOD PRESSURE: 84 MMHG | OXYGEN SATURATION: 99 % | HEIGHT: 62 IN | HEART RATE: 74 BPM | WEIGHT: 180.4 LBS

## 2024-02-06 DIAGNOSIS — D50.9 IRON DEFICIENCY ANEMIA, UNSPECIFIED IRON DEFICIENCY ANEMIA TYPE: ICD-10-CM

## 2024-02-06 DIAGNOSIS — Z12.11 COLON CANCER SCREENING: ICD-10-CM

## 2024-02-06 DIAGNOSIS — D12.6 ADENOMATOUS POLYP OF COLON, UNSPECIFIED PART OF COLON: ICD-10-CM

## 2024-02-06 DIAGNOSIS — K62.5 RECTAL BLEEDING: ICD-10-CM

## 2024-02-06 DIAGNOSIS — K62.5 RECTAL BLEEDING: Primary | ICD-10-CM

## 2024-02-06 LAB
BASOPHILS # BLD AUTO: 0.05 THOUSANDS/ÂΜL (ref 0–0.1)
BASOPHILS NFR BLD AUTO: 1 % (ref 0–1)
EOSINOPHIL # BLD AUTO: 0.08 THOUSAND/ÂΜL (ref 0–0.61)
EOSINOPHIL NFR BLD AUTO: 2 % (ref 0–6)
ERYTHROCYTE [DISTWIDTH] IN BLOOD BY AUTOMATED COUNT: 14.4 % (ref 11.6–15.1)
HCT VFR BLD AUTO: 36.3 % (ref 34.8–46.1)
HGB BLD-MCNC: 10.5 G/DL (ref 11.5–15.4)
IMM GRANULOCYTES # BLD AUTO: 0.01 THOUSAND/UL (ref 0–0.2)
IMM GRANULOCYTES NFR BLD AUTO: 0 % (ref 0–2)
LYMPHOCYTES # BLD AUTO: 2.46 THOUSANDS/ÂΜL (ref 0.6–4.47)
LYMPHOCYTES NFR BLD AUTO: 47 % (ref 14–44)
MCH RBC QN AUTO: 23.9 PG (ref 26.8–34.3)
MCHC RBC AUTO-ENTMCNC: 28.9 G/DL (ref 31.4–37.4)
MCV RBC AUTO: 83 FL (ref 82–98)
MONOCYTES # BLD AUTO: 0.37 THOUSAND/ÂΜL (ref 0.17–1.22)
MONOCYTES NFR BLD AUTO: 7 % (ref 4–12)
NEUTROPHILS # BLD AUTO: 2.21 THOUSANDS/ÂΜL (ref 1.85–7.62)
NEUTS SEG NFR BLD AUTO: 43 % (ref 43–75)
NRBC BLD AUTO-RTO: 0 /100 WBCS
PLATELET # BLD AUTO: 420 THOUSANDS/UL (ref 149–390)
PMV BLD AUTO: 9.5 FL (ref 8.9–12.7)
RBC # BLD AUTO: 4.39 MILLION/UL (ref 3.81–5.12)
WBC # BLD AUTO: 5.18 THOUSAND/UL (ref 4.31–10.16)

## 2024-02-06 PROCEDURE — 86231 EMA EACH IG CLASS: CPT

## 2024-02-06 PROCEDURE — 36415 COLL VENOUS BLD VENIPUNCTURE: CPT

## 2024-02-06 PROCEDURE — 86258 DGP ANTIBODY EACH IG CLASS: CPT

## 2024-02-06 PROCEDURE — 86364 TISS TRNSGLTMNASE EA IG CLAS: CPT

## 2024-02-06 PROCEDURE — 82784 ASSAY IGA/IGD/IGG/IGM EACH: CPT

## 2024-02-06 PROCEDURE — 85025 COMPLETE CBC W/AUTO DIFF WBC: CPT

## 2024-02-06 PROCEDURE — 99214 OFFICE O/P EST MOD 30 MIN: CPT | Performed by: INTERNAL MEDICINE

## 2024-02-06 NOTE — PROGRESS NOTES
Consultation -  [facility]  Lolis Huber 49 y.o. female MRN: [id number]  Unit/Bed#:  Encounter: [id number]        Consults    ASSESSMENT/PLAN:     1.  Iron deficiency anemia: Possibly due to irregular menses, has menorrhagia, other possibility includes bleeding from hemorrhoids versus less likely due to peptic ulcer disease/AVM's.  -Will check celiac serologies as well.  -Repeat CBC at this time.  -Given mild dyspepsia symptoms, would recommend EGD at the same time as colonoscopy to assess for upper GI source of anemia.    2.  Postprandial abdominal discomfort/dark stool/iron deficiency anemia: Rule out peptic ulcer disease/gastritis/celiac disease.  -Will plan for EGD.  Check CBC and celiac serologies.  Avoid NSAIDs.    3.  History of adenomatous polyps:  -Patient is overdue for colonoscopy at this time.  Patient was explained about the risks and benefits of the procedure. Risks including but not limited to bleeding, infection, perforation were explained in detail. Also explained about less than 100% sensitivity with the exam and other alternatives.  -Recommend MiraLAX/Dulcolax bowel prep.    4.  Rectal bleeding: Likely hemorrhoidal however patient reports occasional lightheadedness and dizziness therefore would recommend obtaining CBC.  -Recommend sitz bath.  -Start on a fiber supplement every other day.  -Avoid straining.  -If hemorrhoids appear prominent on colonoscopy, consider referral to colorectal surgery for banding.      ______________________________________________________________________    Reason for Consult / Principal Problem: [unfilled]  With history of  HPI: Lolis Huber is a 49 y.o. year old female with history of hemorrhoids presents to [facility].  Patient was last seen in April 2023 by Dr. Pinedo for rectal bleeding and hemorrhoids.  Patient reports that she has intermittent rectal bleeding even now.  She reports that she sometimes feels lightheaded.  She has  tried fiber supplements however tells me that daily use causes her to have increased loose bowel movements which worsens rectal bleeding.  She also uses Tucks.  She has not tried sitz bath.  She also reports occasional dark stool, reports fullness sometimes after eating.  Denies any hematemesis, dysphagia, GERD symptoms.  She denies any regular use of NSAIDs.  She has never had an EGD.  She last underwent colonoscopy in 2019 and was recommended repeat colonoscopy at 5-year interval.  She has had history of adenomatous polyps in the past.  No family history of colon cancer.  Most recent blood work was reviewed and is notable for iron deficiency anemia.  Patient does not take iron supplements as they constipate her.  She does report that she has been having irregular periods for the past 2 years, reports that she is having less frequent menses however when they do come the last almost 7 to 8 days.    Review of Systems:  The remainder of the review of systems was negative except for the pertinent positives noted in HPI.     Historical Information   Past Medical History:   Diagnosis Date    Abnormal Pap smear of cervix     Alcohol intoxication (HCC) 2021    ED visit    Allergies     bactrim, IV contrast dye    Enlarged pituitary gland (HCC)     Shoulder pain, right 2019     Past Surgical History:   Procedure Laterality Date     SECTION      x 1    CHOLECYSTECTOMY  2016    COLONOSCOPY  2019    to be done in 5 years ()    COLONOSCOPY  2016    GALLBLADDER SURGERY      KNEE SURGERY      MAMMO (HISTORICAL) Bilateral 2022    NORMAL    TUBAL LIGATION       Social History   Social History     Substance and Sexual Activity   Alcohol Use Yes    Comment: Social drinker     Social History     Substance and Sexual Activity   Drug Use No     Social History     Tobacco Use   Smoking Status Never   Smokeless Tobacco Never     Family History   Problem Relation Age of Onset    Rheum  "arthritis Mother     Lupus Mother     Arthritis Mother         Rheumatoid    Cancer Father     Heart disease Father         bypass    Melanoma Father     Liver cancer Maternal Grandmother     No Known Problems Sister     No Known Problems Brother     No Known Problems Son     No Known Problems Daughter     No Known Problems Sister     No Known Problems Daughter     No Known Problems Daughter     No Known Problems Daughter        Meds/Allergies     (Not in a hospital admission)    No current facility-administered medications for this visit.       Allergies   Allergen Reactions    Bactrim [Sulfamethoxazole-Trimethoprim] Other (See Comments)     Suicidal    Contrast Dye [Iodinated Contrast Media] Rash       Objective     Blood pressure 128/84, pulse 74, height 5' 2\" (1.575 m), weight 81.8 kg (180 lb 6.4 oz), SpO2 99%.    [unfilled]    PHYSICAL EXAM     GEN: well nourished, well developed, no acute distress  HEENT: anicteric, MMM, no cervical or supraclavicular lymphadenopathy  CV: RRR, no m/r/g  CHEST: CTA b/l, no WRR  ABD: +BS, soft, NT/ND, no hepatosplenomegaly  EXT: no c/c/e  SKIN: no rashes,  NEURO: aaox3    Lab Results:   No visits with results within 1 Day(s) from this visit.   Latest known visit with results is:   Hospital Outpatient Visit on 01/29/2024   Component Date Value    BSA 01/29/2024 1.82     A4C EF 01/29/2024 51     LVIDd 01/29/2024 4.50     LVIDS 01/29/2024 3.20     IVSd 01/29/2024 0.70     LVPWd 01/29/2024 0.70     FS 01/29/2024 29     MV E' Tissue Velocity Se* 01/29/2024 9     LA Volume Index (BP) 01/29/2024 24.7     E/A ratio 01/29/2024 1.39     E wave deceleration time 01/29/2024 166     MV Peak E Christopher 01/29/2024 78     MV Peak A Christopher 01/29/2024 0.56     RVID d 01/29/2024 3.5     Tricuspid annular plane * 01/29/2024 1.80     LA size 01/29/2024 3.4     LA length (A2C) 01/29/2024 4.80     LA volume (BP) 01/29/2024 45     RA 2D Volume 01/29/2024 29.0     RAA A4C 01/29/2024 13.2     MV stenosis pressure " 1/2* 01/29/2024 48     MV valve area p 1/2 meth* 01/29/2024 4.58     Ao root 01/29/2024 2.50     Asc Ao 01/29/2024 2.8     Left ventricular stroke * 01/29/2024 49.00     IVS 01/29/2024 0.7     LEFT VENTRICLE SYSTOLIC * 01/29/2024 41     LV DIASTOLIC VOLUME (MOD* 01/29/2024 90     Left Atrium Area-systoli* 01/29/2024 16.5     Left Atrium Area-systoli* 01/29/2024 16     LVSV, 2D 01/29/2024 49     LV EF 01/29/2024 55      Imaging Studies: I have personally reviewed pertinent films in PACS

## 2024-02-06 NOTE — PATIENT INSTRUCTIONS
Scheduled date of EGD/colonoscopy (as of today): 4/8/23  Physician performing EGD/colonoscopy: Willie  Location of EGD/colonoscopy: [facility]  Desired bowel prep reviewed with patient: Miralax/Dulcolax  Instructions reviewed with patient by: Brea/Jazz  Clearances: N/A

## 2024-02-07 LAB
ENDOMYSIUM IGA SER QL: NEGATIVE
GLIADIN PEPTIDE IGA SER-ACNC: 1 UNITS (ref 0–19)
GLIADIN PEPTIDE IGG SER-ACNC: 9 UNITS (ref 0–19)
IGA SERPL-MCNC: <5 MG/DL (ref 87–352)
TTG IGA SER-ACNC: <2 U/ML (ref 0–3)
TTG IGG SER-ACNC: 6 U/ML (ref 0–5)

## 2024-02-09 ENCOUNTER — OFFICE VISIT (OUTPATIENT)
Dept: FAMILY MEDICINE CLINIC | Facility: CLINIC | Age: 49
End: 2024-02-09
Payer: COMMERCIAL

## 2024-02-09 VITALS
TEMPERATURE: 98 F | SYSTOLIC BLOOD PRESSURE: 102 MMHG | OXYGEN SATURATION: 99 % | WEIGHT: 179 LBS | DIASTOLIC BLOOD PRESSURE: 60 MMHG | BODY MASS INDEX: 31.71 KG/M2 | HEART RATE: 70 BPM | HEIGHT: 63 IN | RESPIRATION RATE: 16 BRPM

## 2024-02-09 DIAGNOSIS — Z12.11 COLON CANCER SCREENING: ICD-10-CM

## 2024-02-09 DIAGNOSIS — D50.9 IRON DEFICIENCY ANEMIA, UNSPECIFIED IRON DEFICIENCY ANEMIA TYPE: ICD-10-CM

## 2024-02-09 DIAGNOSIS — Z12.31 ENCOUNTER FOR SCREENING MAMMOGRAM FOR MALIGNANT NEOPLASM OF BREAST: Primary | ICD-10-CM

## 2024-02-09 DIAGNOSIS — Z00.00 ANNUAL PHYSICAL EXAM: ICD-10-CM

## 2024-02-09 DIAGNOSIS — E23.7 ABNORMALITY OF PITUITARY GLAND (HCC): ICD-10-CM

## 2024-02-09 PROCEDURE — 99396 PREV VISIT EST AGE 40-64: CPT | Performed by: INTERNAL MEDICINE

## 2024-02-09 PROCEDURE — 99214 OFFICE O/P EST MOD 30 MIN: CPT | Performed by: INTERNAL MEDICINE

## 2024-02-09 NOTE — PROGRESS NOTES
ADULT ANNUAL PHYSICAL  [facility] FAMILY MEDICINE    NAME: Lolis Huber  AGE: 49 y.o. SEX: female  : 1975     DATE: 2024     Assessment and Plan:     Problem List Items Addressed This Visit       Abnormality of pituitary gland (HCC)    Iron deficiency anemia    Colon cancer screening     Other Visit Diagnoses       Encounter for screening mammogram for malignant neoplasm of breast    -  Primary    Relevant Orders    Mammo screening bilateral w 3d & cad    Annual physical exam            Lolis here for annual physical, hx of pituitary abnormality, iron deficiency anemia-has EGD/colonoscopy coming up with Dr Tapia-had recent MRI pituitary which showed stability of pituitary gland spot-mammogram discussed and ordered, saw Cardiology for her sensation of palpitations and lightheaded ness and has normal Echocardiogram and normal 48 hour Holter-no plan on cardiac stress test at this time    Immunizations and preventive care screenings were discussed with patient today. Appropriate education was printed on patient's after visit summary.    Counseling:  Alcohol/drug use: discussed moderation in alcohol intake, the recommendations for healthy alcohol use, and avoidance of illicit drug use.  Dental Health: discussed importance of regular tooth brushing, flossing, and dental visits.  Exercise: the importance of regular exercise/physical activity was discussed. Recommend exercise 3-5 times per week for at least 30 minutes.          No follow-ups on file.     Chief Complaint:     Chief Complaint   Patient presents with    Physical Exam      History of Present Illness:     Adult Annual Physical   Patient here for a comprehensive physical exam. The patient reports no problems.    Diet and Physical Activity  Diet/Nutrition: well balanced diet.   Exercise: no formal exercise.      Depression Screening  PHQ-2/9 Depression Screening    Little interest or pleasure in doing  things: 0 - not at all  Feeling down, depressed, or hopeless: 0 - not at all  PHQ-2 Score: 0  PHQ-2 Interpretation: Negative depression screen       General Health  Sleep: sleeps well.   Hearing: normal - bilateral.  Vision: no vision problems.   Dental: regular dental visits.       /GYN Health  Follows with gynecology? yes   Patient is: perimenopausal  Last menstrual period: Sept  Contraceptive method:  not discussed .    Advanced Care Planning  Do you have an advanced directive? no  Do you have a durable medical power of ? no     Review of Systems:     Review of Systems   Constitutional: Negative.    HENT: Negative.     Respiratory:  Negative for shortness of breath.    Cardiovascular:  Positive for palpitations. Negative for chest pain and leg swelling.   Musculoskeletal: Negative.    Neurological:  Positive for light-headedness.   Psychiatric/Behavioral: Negative.        Past Medical History:     Past Medical History:   Diagnosis Date    Abnormal Pap smear of cervix     Alcohol intoxication (HCC) 2021    ED visit    Allergies     bactrim, IV contrast dye    Enlarged pituitary gland (HCC)     Shoulder pain, right 2019      Past Surgical History:     Past Surgical History:   Procedure Laterality Date     SECTION      x 1    CHOLECYSTECTOMY  2016    COLONOSCOPY  2019    to be done in 5 years ()    COLONOSCOPY  2016    GALLBLADDER SURGERY      KNEE SURGERY      MAMMO (HISTORICAL) Bilateral 2022    NORMAL    TUBAL LIGATION        Social History:     Social History     Socioeconomic History    Marital status: /Civil Union     Spouse name: None    Number of children: None    Years of education: None    Highest education level: None   Occupational History    None   Tobacco Use    Smoking status: Never    Smokeless tobacco: Never   Vaping Use    Vaping status: Never Used   Substance and Sexual Activity    Alcohol use: Yes     Comment: Social drinker    Drug  "use: No    Sexual activity: Yes     Partners: Male     Birth control/protection: Surgical   Other Topics Concern    None   Social History Narrative    · Occupation:   house wife         [employer]      Social Determinants of Health     Financial Resource Strain: Not on file   Food Insecurity: Not on file   Transportation Needs: Not on file   Physical Activity: Not on file   Stress: Not on file   Social Connections: Not on file   Intimate Partner Violence: Not on file   Housing Stability: Not on file      Family History:     Family History   Problem Relation Age of Onset    Rheum arthritis Mother     Lupus Mother     Arthritis Mother         Rheumatoid    Cancer Father     Heart disease Father         bypass    Melanoma Father     Liver cancer Maternal Grandmother     No Known Problems Sister     No Known Problems Brother     No Known Problems Son     No Known Problems Daughter     No Known Problems Sister     No Known Problems Daughter     No Known Problems Daughter     No Known Problems Daughter       Current Medications:     No current outpatient medications on file.     No current facility-administered medications for this visit.      Allergies:     Allergies   Allergen Reactions    Bactrim [Sulfamethoxazole-Trimethoprim] Other (See Comments)     Suicidal    Contrast Dye [Iodinated Contrast Media] Rash      Physical Exam:     /60 (BP Location: Left arm, Patient Position: Sitting, Cuff Size: Standard)   Pulse 70   Temp 98 °F (36.7 °C) (Tympanic)   Resp 16   Ht 5' 2.5\" (1.588 m)   Wt 81.2 kg (179 lb)   SpO2 99%   BMI 32.22 kg/m²     Physical Exam  Constitutional:       Appearance: Normal appearance.   HENT:      Head: Normocephalic and atraumatic.      Right Ear: External ear normal.      Left Ear: External ear normal.      Nose: Nose normal.      Mouth/Throat:      Mouth: Mucous membranes are moist.   Eyes:      Extraocular Movements: Extraocular movements intact.   Cardiovascular:      Rate and " Rhythm: Normal rate and regular rhythm.      Heart sounds: Normal heart sounds.   Pulmonary:      Effort: Pulmonary effort is normal.      Breath sounds: Normal breath sounds.   Abdominal:      Palpations: Abdomen is soft.   Musculoskeletal:         General: Normal range of motion.      Cervical back: Normal range of motion.   Skin:     General: Skin is warm.      Capillary Refill: Capillary refill takes less than 2 seconds.   Neurological:      General: No focal deficit present.      Mental Status: She is alert and oriented to person, place, and time.   Psychiatric:         Mood and Affect: Mood normal.         Behavior: Behavior normal.         Thought Content: Thought content normal.          Marsha Green MD  [facility]

## 2024-02-21 PROBLEM — Z12.11 COLON CANCER SCREENING: Status: RESOLVED | Noted: 2024-02-06 | Resolved: 2024-02-21

## 2024-02-26 ENCOUNTER — TELEPHONE (OUTPATIENT)
Age: 49
End: 2024-02-26

## 2024-02-26 NOTE — TELEPHONE ENCOUNTER
Pt calling to r/s her np appt she had to cancel in Feb    Pt geoffrey Jorgensen, advised her currently its booked through Jan 2025 but she can call  to July to schedule with Dr Torres once she returns from [facility]

## 2024-03-19 ENCOUNTER — OFFICE VISIT (OUTPATIENT)
Dept: URGENT CARE | Facility: CLINIC | Age: 49
End: 2024-03-19
Payer: COMMERCIAL

## 2024-03-19 VITALS
TEMPERATURE: 97.9 F | HEART RATE: 65 BPM | OXYGEN SATURATION: 99 % | WEIGHT: 183 LBS | BODY MASS INDEX: 33.68 KG/M2 | DIASTOLIC BLOOD PRESSURE: 66 MMHG | RESPIRATION RATE: 16 BRPM | SYSTOLIC BLOOD PRESSURE: 126 MMHG | HEIGHT: 62 IN

## 2024-03-19 DIAGNOSIS — R35.0 FREQUENT URINATION: Primary | ICD-10-CM

## 2024-03-19 LAB
SL AMB  POCT GLUCOSE, UA: ABNORMAL
SL AMB LEUKOCYTE ESTERASE,UA: ABNORMAL
SL AMB POCT BILIRUBIN,UA: ABNORMAL
SL AMB POCT BLOOD,UA: ABNORMAL
SL AMB POCT CLARITY,UA: CLEAR
SL AMB POCT COLOR,UA: ABNORMAL
SL AMB POCT KETONES,UA: ABNORMAL
SL AMB POCT NITRITE,UA: ABNORMAL
SL AMB POCT PH,UA: 6
SL AMB POCT SPECIFIC GRAVITY,UA: 1.02
SL AMB POCT URINE PROTEIN: ABNORMAL
SL AMB POCT UROBILINOGEN: 0.2

## 2024-03-19 PROCEDURE — 87086 URINE CULTURE/COLONY COUNT: CPT | Performed by: PHYSICIAN ASSISTANT

## 2024-03-19 PROCEDURE — 81002 URINALYSIS NONAUTO W/O SCOPE: CPT | Performed by: PHYSICIAN ASSISTANT

## 2024-03-19 PROCEDURE — G0382 LEV 3 HOSP TYPE B ED VISIT: HCPCS | Performed by: PHYSICIAN ASSISTANT

## 2024-03-19 NOTE — PATIENT INSTRUCTIONS
Take over the counter Azos as instructed on packaging.  Copious fluids to help flush out your system.   Urine sent for culture and we will notify you of any positive result.   If urine is negative, and symptoms are still present in 3-5 days, follow-up with PCP.   If symptoms worse or new symptoms develop, report to the emergency department immediately.

## 2024-03-19 NOTE — PROGRESS NOTES
[facility]        NAME: Lolis Huber is a 49 y.o. female  : 1975    MRN: [id number]  DATE: 2024  TIME: 9:24 AM    Assessment and Plan   Frequent urination [R35.0]  1. Frequent urination  POCT urine dip      Pt presents with possible UTI. UA findings equivocal, will send urine for culture before starting treatment. Recommend OTC Azo for symptoms.       Patient Instructions     Patient Instructions   Take over the counter Azos as instructed on packaging.  Copious fluids to help flush out your system.   Urine sent for culture and we will notify you of any positive result.   If urine is negative, and symptoms are still present in 3-5 days, follow-up with PCP.   If symptoms worse or new symptoms develop, report to the emergency department immediately.     Follow up with PCP in 3-5 days.  Proceed to  ER if symptoms worsen.    Chief Complaint     Chief Complaint   Patient presents with    Possible UTI     Pt states that she has frequency and urgency with no burning that started on off since saturday. Pt did not take anything otc.          History of Present Illness       49 year old female presents with complaint of urgency and frequency of urination since Saturday. She denies burning and itching as well as vaginal discharge. Denies fever, chills, back pain and abdominal pain. Reports baseline dizziness/.         Review of Systems   Review of Systems   Constitutional:  Negative for chills and fever.   Gastrointestinal:  Negative for abdominal distention, abdominal pain, diarrhea, nausea and vomiting.   Genitourinary:  Positive for frequency and urgency. Negative for flank pain.   Musculoskeletal:  Negative for back pain.         Current Medications     No current outpatient medications on file.    Current Allergies     Allergies as of 2024 - Reviewed 2024   Allergen Reaction Noted    Bactrim [sulfamethoxazole-trimethoprim] Other (See Comments) 2019    Contrast dye [iodinated  "contrast media] Rash 12/15/2020            The following portions of the patient's history were reviewed and updated as appropriate: allergies, current medications, past family history, past medical history, past social history, past surgical history and problem list.     Past Medical History:   Diagnosis Date    Abnormal Pap smear of cervix     Alcohol intoxication (HCC) 2021    ED visit    Allergies     bactrim, IV contrast dye    Enlarged pituitary gland (HCC)     Shoulder pain, right 2019       Past Surgical History:   Procedure Laterality Date     SECTION      x 1    CHOLECYSTECTOMY  2016    COLONOSCOPY  2019    to be done in 5 years ()    COLONOSCOPY  2016    GALLBLADDER SURGERY      KNEE SURGERY      MAMMO (HISTORICAL) Bilateral 2022    NORMAL    TUBAL LIGATION         Family History   Problem Relation Age of Onset    Rheum arthritis Mother     Lupus Mother     Arthritis Mother         Rheumatoid    Cancer Father     Heart disease Father         bypass    Melanoma Father     Liver cancer Maternal Grandmother     No Known Problems Sister     No Known Problems Brother     No Known Problems Son     No Known Problems Daughter     No Known Problems Sister     No Known Problems Daughter     No Known Problems Daughter     No Known Problems Daughter          Medications have been verified.        Objective   /66   Pulse 65   Temp 97.9 °F (36.6 °C)   Resp 16   Ht 5' 2\" (1.575 m)   Wt 83 kg (183 lb)   SpO2 99%   BMI 33.47 kg/m²   No LMP recorded.       Physical Exam     Physical Exam  Vitals and nursing note reviewed.   Constitutional:       General: She is awake. She is not in acute distress.     Appearance: Normal appearance. She is well-developed and well-groomed. She is not ill-appearing, toxic-appearing or diaphoretic.   HENT:      Head: Normocephalic and atraumatic.      Right Ear: Hearing and external ear normal.      Left Ear: Hearing and external ear " "normal.   Eyes:      General: Lids are normal. Vision grossly intact. Gaze aligned appropriately.   Cardiovascular:      Rate and Rhythm: Normal rate.   Pulmonary:      Effort: Pulmonary effort is normal.      Comments: Patient is speaking in full sentences with no increased respiratory effort. No audible wheezing or stridor.   Abdominal:      General: Abdomen is flat.      Palpations: Abdomen is soft.      Tenderness: There is no abdominal tenderness. There is no right CVA tenderness or left CVA tenderness.   Musculoskeletal:      Cervical back: Normal range of motion.   Skin:     General: Skin is warm and dry.   Neurological:      Mental Status: She is alert and oriented to person, place, and time.      Coordination: Coordination is intact.      Gait: Gait is intact.   Psychiatric:         Attention and Perception: Attention and perception normal.         Mood and Affect: Mood and affect normal.         Speech: Speech normal.         Behavior: Behavior normal. Behavior is cooperative.               Note: Portions of this record may have been created with voice recognition software. Occasional wrong word or \"sound a like\" substitutions may have occurred due to the inherent limitations of voice recognition software. Please read the chart carefully and recognize, using context, where substitutions have occurred.*      "

## 2024-03-21 LAB — BACTERIA UR CULT: NORMAL

## 2024-03-22 ENCOUNTER — OFFICE VISIT (OUTPATIENT)
Dept: FAMILY MEDICINE CLINIC | Facility: CLINIC | Age: 49
End: 2024-03-22
Payer: COMMERCIAL

## 2024-03-22 VITALS
SYSTOLIC BLOOD PRESSURE: 128 MMHG | BODY MASS INDEX: 33.29 KG/M2 | HEART RATE: 70 BPM | TEMPERATURE: 97.3 F | DIASTOLIC BLOOD PRESSURE: 70 MMHG | OXYGEN SATURATION: 98 % | WEIGHT: 182 LBS

## 2024-03-22 DIAGNOSIS — R82.2 BILIRUBIN IN URINE: ICD-10-CM

## 2024-03-22 DIAGNOSIS — R39.9 UTI SYMPTOMS: Primary | ICD-10-CM

## 2024-03-22 LAB
SL AMB  POCT GLUCOSE, UA: NORMAL
SL AMB LEUKOCYTE ESTERASE,UA: NORMAL
SL AMB POCT BILIRUBIN,UA: NORMAL
SL AMB POCT BLOOD,UA: NORMAL
SL AMB POCT CLARITY,UA: CLEAR
SL AMB POCT COLOR,UA: YELLOW
SL AMB POCT KETONES,UA: NORMAL
SL AMB POCT NITRITE,UA: NORMAL
SL AMB POCT PH,UA: 5
SL AMB POCT SPECIFIC GRAVITY,UA: 1
SL AMB POCT URINE PROTEIN: NORMAL
SL AMB POCT UROBILINOGEN: NORMAL

## 2024-03-22 PROCEDURE — 87086 URINE CULTURE/COLONY COUNT: CPT

## 2024-03-22 PROCEDURE — 81002 URINALYSIS NONAUTO W/O SCOPE: CPT

## 2024-03-22 PROCEDURE — 99214 OFFICE O/P EST MOD 30 MIN: CPT

## 2024-03-22 RX ORDER — NITROFURANTOIN 25; 75 MG/1; MG/1
100 CAPSULE ORAL 2 TIMES DAILY
Qty: 10 CAPSULE | Refills: 0 | Status: SHIPPED | OUTPATIENT
Start: 2024-03-22 | End: 2024-03-27

## 2024-03-22 NOTE — ASSESSMENT & PLAN NOTE
Pt seen in Urgent Care 3/19 for frequent urination and urgency. Urine dip done had trace leukocytes and blood pt was started on Azo and advised to increase fluids, urine culture done indicates contamination. Pt reports symptoms have not improved completed Azo greater than 24 hrs denies fever, back/flank pain, dysuria. Urine dip in office negative will empirically start on nitrofurantoin 100 mg bid x 5 days and repeat culture. Advised if symptoms fail to improve would f/u with urology.

## 2024-03-22 NOTE — ASSESSMENT & PLAN NOTE
UA in office with moderate bilirubin pt recently completed phenazopyridine tid x 3 days for urinary frequency educate elevation most likely secondary to  medication use denies recent alcohol intake. Recommend increased fluids, UA sent.

## 2024-03-22 NOTE — PROGRESS NOTES
Name: Lolis Huber      : 1975      MRN: [id number]  Encounter Provider: LORENA Lara  Encounter Date: 3/22/2024   Encounter department: [facility]    Assessment & Plan     1. UTI symptoms  Assessment & Plan:  Pt seen in Urgent Care 3/19 for frequent urination and urgency. Urine dip done had trace leukocytes and blood pt was started on Azo and advised to increase fluids, urine culture done indicates contamination. Pt reports symptoms have not improved completed Azo greater than 24 hrs denies fever, back/flank pain, dysuria. Urine dip in office negative will empirically start on nitrofurantoin 100 mg bid x 5 days and repeat culture. Advised if symptoms fail to improve would f/u with urology.    Orders:  -     nitrofurantoin (MACROBID) 100 mg capsule; Take 1 capsule (100 mg total) by mouth 2 (two) times a day for 5 days  -     POCT urine dip  -     Urine culture; Future    2. Bilirubin in urine  Assessment & Plan:  UA in office with moderate bilirubin pt recently completed phenazopyridine tid x 3 days for urinary frequency educate elevation most likely secondary to  medication use denies recent alcohol intake. Recommend increased fluids, UA sent.               Subjective      Pt presents for f/u was seen at Urgent care 3/19 had UA and urine culture done that was reported as inconclusive pt was advise to start Azo and f/u for retesting. Pt reports completed Azo continues with urinary  frequency and urgency, denies dysuria, back/flank pain, N/V or fever.       Review of Systems   Constitutional:  Negative for fatigue and fever.   Eyes:  Negative for visual disturbance.   Gastrointestinal:  Negative for abdominal pain, nausea and vomiting.   Genitourinary:  Positive for frequency and urgency. Negative for decreased urine volume, difficulty urinating, dyspareunia, dysuria, flank pain, hematuria, pelvic pain and vaginal discharge.   Musculoskeletal:  Negative for back  pain.   Skin:  Negative for color change.   Neurological:  Negative for headaches.   Psychiatric/Behavioral:  Negative for agitation.        No current outpatient medications on file prior to visit.       Objective     /70 (BP Location: Right arm, Patient Position: Sitting, Cuff Size: Large)   Pulse 70   Temp (!) 97.3 °F (36.3 °C) (Tympanic)   Wt 82.6 kg (182 lb)   SpO2 98%   BMI 33.29 kg/m²     Physical Exam  Vitals and nursing note reviewed.   Constitutional:       General: She is not in acute distress.     Appearance: Normal appearance. She is normal weight. She is not ill-appearing or toxic-appearing.   HENT:      Head: Normocephalic and atraumatic.      Right Ear: Tympanic membrane, ear canal and external ear normal. There is no impacted cerumen.      Left Ear: Tympanic membrane, ear canal and external ear normal. There is no impacted cerumen.      Nose: Nose normal. No congestion or rhinorrhea.      Mouth/Throat:      Mouth: Mucous membranes are moist.      Pharynx: No oropharyngeal exudate or posterior oropharyngeal erythema.   Eyes:      General:         Right eye: No discharge.         Left eye: No discharge.      Extraocular Movements: Extraocular movements intact.      Conjunctiva/sclera: Conjunctivae normal.      Pupils: Pupils are equal, round, and reactive to light.   Neck:      Vascular: No carotid bruit.   Cardiovascular:      Rate and Rhythm: Normal rate and regular rhythm.      Pulses: Normal pulses.      Heart sounds: Normal heart sounds. No murmur heard.  Pulmonary:      Effort: Pulmonary effort is normal. No respiratory distress.      Breath sounds: Normal breath sounds. No wheezing.   Chest:      Chest wall: No tenderness.   Abdominal:      General: Abdomen is flat. Bowel sounds are normal. There is no distension.      Palpations: Abdomen is soft. There is no shifting dullness, fluid wave, hepatomegaly, splenomegaly, mass or pulsatile mass.      Tenderness: There is no abdominal  tenderness. There is no right CVA tenderness, left CVA tenderness, guarding or rebound. Negative signs include Becerra's sign, Rovsing's sign, McBurney's sign, psoas sign and obturator sign.      Hernia: No hernia is present.   Musculoskeletal:         General: No swelling or tenderness. Normal range of motion.      Cervical back: Normal range of motion. No rigidity or tenderness.      Right lower leg: No edema.      Left lower leg: No edema.   Lymphadenopathy:      Cervical: No cervical adenopathy.   Skin:     General: Skin is warm.      Capillary Refill: Capillary refill takes less than 2 seconds.      Coloration: Skin is not jaundiced.      Findings: No bruising, erythema or rash.   Neurological:      General: No focal deficit present.      Mental Status: She is alert and oriented to person, place, and time.      Cranial Nerves: No cranial nerve deficit.      Sensory: No sensory deficit.      Coordination: Coordination normal.   Psychiatric:         Mood and Affect: Mood normal.         Behavior: Behavior normal.         Thought Content: Thought content normal.       LORENA Lara

## 2024-03-23 LAB — BACTERIA UR CULT: NORMAL

## 2024-03-25 ENCOUNTER — ANESTHESIA (OUTPATIENT)
Dept: ANESTHESIOLOGY | Facility: HOSPITAL | Age: 49
End: 2024-03-25

## 2024-03-25 ENCOUNTER — ANESTHESIA EVENT (OUTPATIENT)
Dept: ANESTHESIOLOGY | Facility: HOSPITAL | Age: 49
End: 2024-03-25

## 2024-04-08 ENCOUNTER — TELEPHONE (OUTPATIENT)
Age: 49
End: 2024-04-08

## 2024-04-08 ENCOUNTER — TELEPHONE (OUTPATIENT)
Dept: GASTROENTEROLOGY | Facility: AMBULARY SURGERY CENTER | Age: 49
End: 2024-04-08

## 2024-04-08 NOTE — TELEPHONE ENCOUNTER
Scheduled date of colonoscopy (as of today):5/21/2024  Physician performing colonoscopy:LS  Location of colonoscopy:[facility]  Bowel prep reviewed with patient:M&D  Instructions reviewed with patient by:NW  Clearances: NONE

## 2024-04-08 NOTE — TELEPHONE ENCOUNTER
I called patient to ask if she was getting prenatal care somewhere else, but she did not answer. If she calls please let her know that she needs to be seen ASAP for a prenatal visit, if she is not being seen somewhere else. Patients GI provider:       Number to return call: ( [phone]    Reason for call: Pt calling had to reschedule;e colonoscopy due to being sick. Pt wants to know if she can have earlier appt in [place] before 5/21//2024    Scheduled procedure/appointment date if applicable: Apt/procedure

## 2024-04-09 ENCOUNTER — OFFICE VISIT (OUTPATIENT)
Dept: UROLOGY | Facility: CLINIC | Age: 49
End: 2024-04-09
Payer: COMMERCIAL

## 2024-04-09 ENCOUNTER — OFFICE VISIT (OUTPATIENT)
Dept: FAMILY MEDICINE CLINIC | Facility: CLINIC | Age: 49
End: 2024-04-09
Payer: COMMERCIAL

## 2024-04-09 VITALS
TEMPERATURE: 97.6 F | HEART RATE: 79 BPM | DIASTOLIC BLOOD PRESSURE: 84 MMHG | HEIGHT: 62 IN | SYSTOLIC BLOOD PRESSURE: 120 MMHG | OXYGEN SATURATION: 97 % | WEIGHT: 181 LBS | BODY MASS INDEX: 33.31 KG/M2

## 2024-04-09 VITALS
HEIGHT: 62 IN | TEMPERATURE: 97.3 F | WEIGHT: 178 LBS | RESPIRATION RATE: 16 BRPM | HEART RATE: 88 BPM | DIASTOLIC BLOOD PRESSURE: 78 MMHG | BODY MASS INDEX: 32.76 KG/M2 | SYSTOLIC BLOOD PRESSURE: 124 MMHG | OXYGEN SATURATION: 98 %

## 2024-04-09 DIAGNOSIS — R39.15 URINARY URGENCY: Primary | ICD-10-CM

## 2024-04-09 DIAGNOSIS — R05.9 COUGH, UNSPECIFIED TYPE: Primary | ICD-10-CM

## 2024-04-09 LAB
BACTERIA UR QL AUTO: ABNORMAL /HPF
BILIRUB UR QL STRIP: NEGATIVE
CLARITY UR: CLEAR
COLOR UR: YELLOW
GLUCOSE UR STRIP-MCNC: NEGATIVE MG/DL
HGB UR QL STRIP.AUTO: NEGATIVE
KETONES UR STRIP-MCNC: NEGATIVE MG/DL
LEUKOCYTE ESTERASE UR QL STRIP: NEGATIVE
NITRITE UR QL STRIP: NEGATIVE
NON-SQ EPI CELLS URNS QL MICRO: ABNORMAL /HPF
PH UR STRIP.AUTO: 6 [PH]
POST-VOID RESIDUAL VOLUME, ML POC: 35 ML
PROT UR STRIP-MCNC: ABNORMAL MG/DL
RBC #/AREA URNS AUTO: ABNORMAL /HPF
SL AMB  POCT GLUCOSE, UA: NORMAL
SL AMB LEUKOCYTE ESTERASE,UA: NORMAL
SL AMB POCT BILIRUBIN,UA: NORMAL
SL AMB POCT BLOOD,UA: NORMAL
SL AMB POCT CLARITY,UA: CLEAR
SL AMB POCT COLOR,UA: YELLOW
SL AMB POCT KETONES,UA: NORMAL
SL AMB POCT NITRITE,UA: NORMAL
SL AMB POCT PH,UA: 5
SL AMB POCT SPECIFIC GRAVITY,UA: 1.01
SL AMB POCT URINE PROTEIN: 15
SL AMB POCT UROBILINOGEN: 0.2
SP GR UR STRIP.AUTO: 1.02 (ref 1–1.03)
UROBILINOGEN UR STRIP-ACNC: <2 MG/DL
WBC #/AREA URNS AUTO: ABNORMAL /HPF

## 2024-04-09 PROCEDURE — 99203 OFFICE O/P NEW LOW 30 MIN: CPT | Performed by: PHYSICIAN ASSISTANT

## 2024-04-09 PROCEDURE — 99214 OFFICE O/P EST MOD 30 MIN: CPT | Performed by: INTERNAL MEDICINE

## 2024-04-09 PROCEDURE — 51798 US URINE CAPACITY MEASURE: CPT | Performed by: PHYSICIAN ASSISTANT

## 2024-04-09 PROCEDURE — 81001 URINALYSIS AUTO W/SCOPE: CPT | Performed by: PHYSICIAN ASSISTANT

## 2024-04-09 PROCEDURE — 81002 URINALYSIS NONAUTO W/O SCOPE: CPT | Performed by: PHYSICIAN ASSISTANT

## 2024-04-09 RX ORDER — AZITHROMYCIN 250 MG/1
TABLET, FILM COATED ORAL
Qty: 6 TABLET | Refills: 0 | Status: SHIPPED | OUTPATIENT
Start: 2024-04-09 | End: 2024-04-13

## 2024-04-09 RX ORDER — ALBUTEROL SULFATE 90 UG/1
2 AEROSOL, METERED RESPIRATORY (INHALATION) EVERY 6 HOURS PRN
Qty: 18 G | Refills: 5 | Status: SHIPPED | OUTPATIENT
Start: 2024-04-09

## 2024-04-09 NOTE — PROGRESS NOTES
Assessment/Plan:Viral vs bacterial URI-suggest warm fluids, mucinex dm, albuterol and will send zpack to use if worse or no better         Problem List Items Addressed This Visit    None  Visit Diagnoses       Cough, unspecified type    -  Primary    Relevant Medications    azithromycin (ZITHROMAX) 250 mg tablet    albuterol (Ventolin HFA) 90 mcg/act inhaler              Subjective:      Patient ID: Lolis Huber is a 49 y.o. female.    Lolis sick since Friday with cough, fatigue, congestion-has  on Friday to go to-    Cough  Associated symptoms include headaches. Pertinent negatives include no fever or sore throat.       The following portions of the patient's history were reviewed and updated as appropriate:   Past Medical History:  She has a past medical history of Abnormal Pap smear of cervix, Alcohol intoxication (HCC) (2021), Allergies, Enlarged pituitary gland (HCC), and Shoulder pain, right (2019).,  _______________________________________________________________________  Medical Problems:  does not have any pertinent problems on file.,  _______________________________________________________________________  Past Surgical History:   has a past surgical history that includes Knee surgery; Gallbladder surgery;  section; Tubal ligation; Colonoscopy (2019); Cholecystectomy (2016); Colonoscopy (2016); and Mammo (historical) (Bilateral, 2022).,  _______________________________________________________________________  Family History:  family history includes Arthritis in her mother; Cancer in her father; Heart disease in her father; Liver cancer in her maternal grandmother; Lupus in her mother; Melanoma in her father; No Known Problems in her brother, daughter, daughter, daughter, daughter, sister, sister, and son; Rheum arthritis in her mother.,  _______________________________________________________________________  Social History:   reports that she has never  "smoked. She has never used smokeless tobacco. She reports current alcohol use. She reports that she does not use drugs.,  _______________________________________________________________________  Allergies:  is allergic to bactrim [sulfamethoxazole-trimethoprim] and contrast dye [iodinated contrast media]..  _______________________________________________________________________  Current Outpatient Medications   Medication Sig Dispense Refill    albuterol (Ventolin HFA) 90 mcg/act inhaler Inhale 2 puffs every 6 (six) hours as needed for wheezing 18 g 5    azithromycin (ZITHROMAX) 250 mg tablet Take 2 tablets today then 1 tablet daily x 4 days 6 tablet 0     No current facility-administered medications for this visit.     _______________________________________________________________________  Review of Systems   Constitutional:  Negative for fever.   HENT:  Positive for congestion. Negative for sore throat.    Respiratory:  Positive for cough.    Neurological:  Positive for headaches.         Objective:  Vitals:    04/09/24 1321   BP: 120/84   BP Location: Left arm   Patient Position: Sitting   Cuff Size: Standard   Pulse: 79   Temp: 97.6 °F (36.4 °C)   TempSrc: Tympanic   SpO2: 97%   Weight: 82.1 kg (181 lb)   Height: 5' 2\" (1.575 m)     Body mass index is 33.11 kg/m².     Physical Exam  Constitutional:       Appearance: Normal appearance.   HENT:      Head: Normocephalic and atraumatic.      Right Ear: Tympanic membrane, ear canal and external ear normal.      Left Ear: Tympanic membrane, ear canal and external ear normal.      Nose: Nose normal.      Mouth/Throat:      Mouth: Mucous membranes are moist.   Eyes:      Extraocular Movements: Extraocular movements intact.      Pupils: Pupils are equal, round, and reactive to light.   Cardiovascular:      Rate and Rhythm: Normal rate and regular rhythm.   Pulmonary:      Effort: Pulmonary effort is normal.      Breath sounds: Normal breath sounds.   Musculoskeletal:    "   Cervical back: Normal range of motion and neck supple.   Skin:     General: Skin is warm.   Neurological:      General: No focal deficit present.      Mental Status: She is alert.   Psychiatric:         Mood and Affect: Mood normal.

## 2024-04-09 NOTE — PROGRESS NOTES
2024      Chief Complaint   Patient presents with    Follow-up         Assessment and Plan    49 y.o. female new patient     LUTS  - Consistent with OAB symptoms.   - Urine cultures negative  - Urine dip today + blood. Negative nitrites or leuks. Sent out for UA micro. If 3 or more RBCs per hpf, recommend hematuria work-up  - PVR 35 mL  - Recommend adequate hydration and avoiding bladder irritants to minimize symptoms  - Discussed trial of OAB medication or pelvic floor PT which she defers at this time      History of Present Illness  Lolis Huber is a 49 y.o. female here for new patient evaluation of  UTI symptoms. She was seen in the urgent care last month with complaint of frequent urination and urgency. Urine culture was negative. She was recommended to take AZO. She then saw PCP with persistent symptoms and was prescribed Macrobid course. Urine culture was again negative. She continues to have urinary frequency and urgency symptoms. No dysuria or gross hematuria. PAWAN at times. Denies any prior  surgical manipulation. No family history of  malignancy. No smoking history.         Review of Systems   Constitutional:  Negative for chills and fever.   Respiratory:  Negative for shortness of breath.    Cardiovascular:  Negative for chest pain.   Gastrointestinal:  Negative for abdominal pain and constipation.   Genitourinary:  Positive for frequency and urgency. Negative for difficulty urinating, dysuria, flank pain, hematuria and pelvic pain.   Neurological:  Negative for dizziness.                  Past Medical History  Past Medical History:   Diagnosis Date    Abnormal Pap smear of cervix     Alcohol intoxication (HCC) 2021    ED visit    Allergies     bactrim, IV contrast dye    Enlarged pituitary gland (HCC)     Shoulder pain, right 2019       Past Social History  Past Surgical History:   Procedure Laterality Date     SECTION      x 1    CHOLECYSTECTOMY  2016    COLONOSCOPY   05/06/2019    to be done in 5 years (2024)    COLONOSCOPY  03/18/2016    GALLBLADDER SURGERY      KNEE SURGERY      MAMMO (HISTORICAL) Bilateral 02/17/2022    NORMAL    TUBAL LIGATION       Social History     Tobacco Use   Smoking Status Never   Smokeless Tobacco Never       Past Family History  Family History   Problem Relation Age of Onset    Rheum arthritis Mother     Lupus Mother     Arthritis Mother         Rheumatoid    Cancer Father     Heart disease Father         bypass    Melanoma Father     Liver cancer Maternal Grandmother     No Known Problems Sister     No Known Problems Brother     No Known Problems Son     No Known Problems Daughter     No Known Problems Sister     No Known Problems Daughter     No Known Problems Daughter     No Known Problems Daughter        Past Social history  Social History     Socioeconomic History    Marital status: /Civil Union     Spouse name: Not on file    Number of children: Not on file    Years of education: Not on file    Highest education level: Not on file   Occupational History    Not on file   Tobacco Use    Smoking status: Never    Smokeless tobacco: Never   Vaping Use    Vaping status: Never Used   Substance and Sexual Activity    Alcohol use: Yes     Comment: Social drinker    Drug use: No    Sexual activity: Yes     Partners: Male     Birth control/protection: Surgical   Other Topics Concern    Not on file   Social History Narrative    · Occupation:   house wife         Per [employer]      Social Determinants of Health     Financial Resource Strain: Not on file   Food Insecurity: Not on file   Transportation Needs: Not on file   Physical Activity: Not on file   Stress: Not on file   Social Connections: Not on file   Intimate Partner Violence: Not on file   Housing Stability: Not on file       Current Medications  No current outpatient medications on file.     No current facility-administered medications for this visit.       Allergies  Allergies   Allergen  "Reactions    Bactrim [Sulfamethoxazole-Trimethoprim] Other (See Comments)     Suicidal    Contrast Dye [Iodinated Contrast Media] Rash         The following portions of the patient's history were reviewed and updated as appropriate: allergies, current medications, past medical history, past social history, past surgical history and problem list.      Vitals  Vitals:    04/09/24 1129   BP: 124/78   Pulse: 88   Resp: 16   Temp: (!) 97.3 °F (36.3 °C)   SpO2: 98%   Weight: 80.7 kg (178 lb)   Height: 5' 2\" (1.575 m)           Physical Exam  Physical Exam  Constitutional:       Appearance: Normal appearance.   HENT:      Head: Normocephalic and atraumatic.      Right Ear: External ear normal.      Left Ear: External ear normal.      Nose: Nose normal.   Eyes:      General: No scleral icterus.     Conjunctiva/sclera: Conjunctivae normal.   Cardiovascular:      Pulses: Normal pulses.   Pulmonary:      Effort: Pulmonary effort is normal.   Musculoskeletal:         General: Normal range of motion.      Cervical back: Normal range of motion.   Neurological:      General: No focal deficit present.      Mental Status: She is alert and oriented to person, place, and time.   Psychiatric:         Mood and Affect: Mood normal.         Behavior: Behavior normal.         Thought Content: Thought content normal.         Judgment: Judgment normal.           Results  Recent Results (from the past 1 hour(s))   POCT Measure PVR    Collection Time: 04/09/24 11:31 AM   Result Value Ref Range    POST-VOID RESIDUAL VOLUME, ML POC 35 mL   POCT urine dip    Collection Time: 04/09/24 11:34 AM   Result Value Ref Range    LEUKOCYTE ESTERASE,UA -     NITRITE,UA -     SL AMB POCT UROBILINOGEN 0.2     POCT URINE PROTEIN 15      PH,UA 5.0     BLOOD,UA +     SPECIFIC GRAVITY,UA 1.015     KETONES,UA +     BILIRUBIN,UA +     GLUCOSE, UA -      COLOR,UA yellow     CLARITY,UA clear    ]  No results found for: \"PSA\"  Lab Results   Component Value Date    " CALCIUM 9.6 12/14/2023    K 3.8 12/14/2023    CO2 26 12/14/2023     12/14/2023    BUN 10 12/14/2023    CREATININE 0.59 (L) 12/14/2023     Lab Results   Component Value Date    WBC 5.18 02/06/2024    HGB 10.5 (L) 02/06/2024    HCT 36.3 02/06/2024    MCV 83 02/06/2024     (H) 02/06/2024           Orders  Orders Placed This Encounter   Procedures    POCT urine dip    POCT Measure PVR       Nery Bee

## 2024-04-11 ENCOUNTER — TELEPHONE (OUTPATIENT)
Dept: UROLOGY | Facility: CLINIC | Age: 49
End: 2024-04-11

## 2024-04-11 NOTE — TELEPHONE ENCOUNTER
LVM per cc relaying Nery SANCHES message.  UA micro negative for significant RBCs. No additional work-up needed at this time. Provided office number     ----- Message from Nery Bee PA-C sent at 4/10/2024  8:14 AM EDT -----  UA micro negative for significant RBCs. No additional work-up needed at this time

## 2024-04-16 PROBLEM — M77.12 LATERAL EPICONDYLITIS OF LEFT ELBOW: Status: RESOLVED | Noted: 2019-01-07 | Resolved: 2024-04-16

## 2024-04-16 PROBLEM — H53.2 DOUBLE VISION: Status: RESOLVED | Noted: 2020-09-03 | Resolved: 2024-04-16

## 2024-04-16 PROBLEM — G44.52 NEW DAILY PERSISTENT HEADACHE: Status: RESOLVED | Noted: 2020-11-23 | Resolved: 2024-04-16

## 2024-04-16 PROBLEM — R45.83: Status: RESOLVED | Noted: 2020-11-23 | Resolved: 2024-04-16

## 2024-04-16 PROBLEM — Z01.818 PRE-OPERATIVE CLEARANCE: Status: RESOLVED | Noted: 2023-12-13 | Resolved: 2024-04-16

## 2024-04-16 PROBLEM — G51.0 FACIAL PARALYSIS ON RIGHT SIDE: Status: RESOLVED | Noted: 2020-09-03 | Resolved: 2024-04-16

## 2024-04-19 ENCOUNTER — ANESTHESIA (OUTPATIENT)
Dept: ANESTHESIOLOGY | Facility: HOSPITAL | Age: 49
End: 2024-04-19

## 2024-04-19 ENCOUNTER — ANESTHESIA EVENT (OUTPATIENT)
Dept: ANESTHESIOLOGY | Facility: HOSPITAL | Age: 49
End: 2024-04-19

## 2024-05-03 ENCOUNTER — ANESTHESIA (OUTPATIENT)
Dept: GASTROENTEROLOGY | Facility: AMBULARY SURGERY CENTER | Age: 49
End: 2024-05-03

## 2024-05-03 ENCOUNTER — ANESTHESIA EVENT (OUTPATIENT)
Dept: GASTROENTEROLOGY | Facility: AMBULARY SURGERY CENTER | Age: 49
End: 2024-05-03

## 2024-05-03 ENCOUNTER — HOSPITAL ENCOUNTER (OUTPATIENT)
Dept: GASTROENTEROLOGY | Facility: AMBULARY SURGERY CENTER | Age: 49
Setting detail: OUTPATIENT SURGERY
Discharge: HOME | End: 2024-05-03
Attending: INTERNAL MEDICINE
Payer: COMMERCIAL

## 2024-05-03 VITALS
TEMPERATURE: 96.1 F | OXYGEN SATURATION: 99 % | RESPIRATION RATE: 19 BRPM | DIASTOLIC BLOOD PRESSURE: 82 MMHG | HEART RATE: 72 BPM | SYSTOLIC BLOOD PRESSURE: 119 MMHG

## 2024-05-03 DIAGNOSIS — D50.9 IRON DEFICIENCY ANEMIA, UNSPECIFIED IRON DEFICIENCY ANEMIA TYPE: ICD-10-CM

## 2024-05-03 DIAGNOSIS — Z12.11 COLON CANCER SCREENING: ICD-10-CM

## 2024-05-03 DIAGNOSIS — K29.70 GASTRITIS WITHOUT BLEEDING, UNSPECIFIED CHRONICITY, UNSPECIFIED GASTRITIS TYPE: Primary | ICD-10-CM

## 2024-05-03 DIAGNOSIS — R19.5 LOOSE STOOLS: ICD-10-CM

## 2024-05-03 PROCEDURE — 43239 EGD BIOPSY SINGLE/MULTIPLE: CPT | Performed by: INTERNAL MEDICINE

## 2024-05-03 PROCEDURE — 45385 COLONOSCOPY W/LESION REMOVAL: CPT | Performed by: INTERNAL MEDICINE

## 2024-05-03 PROCEDURE — 88305 TISSUE EXAM BY PATHOLOGIST: CPT | Performed by: STUDENT IN AN ORGANIZED HEALTH CARE EDUCATION/TRAINING PROGRAM

## 2024-05-03 RX ORDER — PANTOPRAZOLE SODIUM 40 MG/1
40 TABLET, DELAYED RELEASE ORAL DAILY
Qty: 90 TABLET | Refills: 0 | Status: SHIPPED | OUTPATIENT
Start: 2024-05-03 | End: 2024-08-01

## 2024-05-03 RX ORDER — DICYCLOMINE HYDROCHLORIDE 10 MG/1
10 CAPSULE ORAL 3 TIMES DAILY PRN
Qty: 90 CAPSULE | Refills: 0 | Status: SHIPPED | OUTPATIENT
Start: 2024-05-03 | End: 2024-06-02

## 2024-05-03 RX ORDER — PROPOFOL 10 MG/ML
INJECTION, EMULSION INTRAVENOUS AS NEEDED
Status: DISCONTINUED | OUTPATIENT
Start: 2024-05-03 | End: 2024-05-03

## 2024-05-03 RX ORDER — SODIUM CHLORIDE, SODIUM LACTATE, POTASSIUM CHLORIDE, CALCIUM CHLORIDE 600; 310; 30; 20 MG/100ML; MG/100ML; MG/100ML; MG/100ML
INJECTION, SOLUTION INTRAVENOUS CONTINUOUS PRN
Status: DISCONTINUED | OUTPATIENT
Start: 2024-05-03 | End: 2024-05-03

## 2024-05-03 RX ADMIN — Medication 40 MG: at 09:42

## 2024-05-03 RX ADMIN — PROPOFOL 50 MG: 10 INJECTION, EMULSION INTRAVENOUS at 09:37

## 2024-05-03 RX ADMIN — PROPOFOL 50 MG: 10 INJECTION, EMULSION INTRAVENOUS at 09:46

## 2024-05-03 RX ADMIN — PROPOFOL 50 MG: 10 INJECTION, EMULSION INTRAVENOUS at 09:41

## 2024-05-03 RX ADMIN — PROPOFOL 150 MG: 10 INJECTION, EMULSION INTRAVENOUS at 09:26

## 2024-05-03 RX ADMIN — PROPOFOL 50 MG: 10 INJECTION, EMULSION INTRAVENOUS at 09:28

## 2024-05-03 RX ADMIN — PROPOFOL 50 MG: 10 INJECTION, EMULSION INTRAVENOUS at 09:32

## 2024-05-03 RX ADMIN — SODIUM CHLORIDE, SODIUM LACTATE, POTASSIUM CHLORIDE, AND CALCIUM CHLORIDE: .6; .31; .03; .02 INJECTION, SOLUTION INTRAVENOUS at 09:23

## 2024-05-03 NOTE — ANESTHESIA PREPROCEDURE EVALUATION
"Procedure:  EGD  COLONOSCOPY    Relevant Problems   ANESTHESIA (within normal limits)      CARDIO (within normal limits)      ENDO (within normal limits)      GI/HEPATIC   (+) Rectal bleeding      /RENAL (within normal limits)      HEMATOLOGY   (+) Iron deficiency anemia      NEURO/PSYCH (within normal limits)      PULMONARY (within normal limits)      Neurology/Sleep   (+) Pituitary microadenoma (HCC)      TTE 1/29/2024:    Left Ventricle: Left ventricular cavity size is normal. Wall thickness is normal. The left ventricular ejection fraction is 55%. Systolic function is normal. Wall motion is normal. Diastolic function is normal.    Tricuspid Valve: There is mild regurgitation.    Holter 1/25/2024:  IMPRESSION:  The patient had predominantly sinus rhythm.   Heart rate varied from 57 bpm to 141 bpm.  The patient’s average heart rate was 83 bpm.    The patient had a holter monitor tracing done for 47 hours and 58 minutes.  The patient had 0 ventricular ectopic activity versus Leda Phenomenon.  The patient had 37 PACs.   The longest R/R interval was 1.1 seconds.  No other major arrhythmia was noted.  A diary was submitted. Symptoms of SOB and dizziness do not correlate with any cardiac arrhythmia.      Benign 48 hour holter tracing report.   Lab Results   Component Value Date    WBC 5.18 02/06/2024    HGB 10.5 (L) 02/06/2024    HCT 36.3 02/06/2024    MCV 83 02/06/2024     (H) 02/06/2024     Lab Results   Component Value Date    SODIUM 138 12/14/2023    K 3.8 12/14/2023     12/14/2023    CO2 26 12/14/2023    BUN 10 12/14/2023    CREATININE 0.59 (L) 12/14/2023    GLUC 97 11/18/2022    CALCIUM 9.6 12/14/2023     No results found for: \"INR\", \"PROTIME\"  No results found for: \"HGBA1C\"       Physical Exam    Airway    Mallampati score: I  TM Distance: >3 FB  Neck ROM: full     Dental   No notable dental hx     Cardiovascular  Cardiovascular exam normal    Pulmonary  Pulmonary exam normal     Other " Findings  post-pubertal.      Anesthesia Plan  ASA Score- 2     Anesthesia Type- IV sedation with anesthesia with ASA Monitors.         Additional Monitors:     Airway Plan:            Plan Factors-Exercise tolerance (METS): >4 METS.    Chart reviewed. EKG reviewed.  Existing labs reviewed. Patient summary reviewed.                  Induction- intravenous.    Postoperative Plan-     Informed Consent- Anesthetic plan and risks discussed with patient.  I personally reviewed this patient with the CRNA. Discussed and agreed on the Anesthesia Plan with the CRNA..

## 2024-05-03 NOTE — ANESTHESIA POSTPROCEDURE EVALUATION
Post-Op Assessment Note    CV Status:  Stable    Pain management: adequate       Mental Status:  Alert and awake   Hydration Status:  Euvolemic   PONV Controlled:  Controlled   Airway Patency:  Patent     Post Op Vitals Reviewed: Yes    No anethesia notable event occurred.    Staff: Anesthesiologist, CRNA               BP   118/68   Temp 97   Pulse 76   Resp 15   SpO2 99

## 2024-05-03 NOTE — H&P
History and Physical -  [facility]  Lolis Huber 49 y.o. female MRN: [id number]    HPI: Lolis Huber is a 49 y.o. year old female who presents for evaluation of iron deficiency anemia, has history of adenomatous polyps.  Also reports having had dark stools.      Review of Systems    Historical Information   Past Medical History:   Diagnosis Date    Abnormal Pap smear of cervix     Alcohol intoxication (HCC) 2021    ED visit    Allergies     bactrim, IV contrast dye    Double vision 2020    Last assessed 2021    Enlarged pituitary gland (HCC)     Excessive crying of adult 2020    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 2020    Last assessed 2023    Lateral epicondylitis of left elbow 2019    Last assessed 2019    New daily persistent headache 2020    Last assessed 12/15/2020    Pre-operative clearance 2023    Shoulder pain, right 2019     Past Surgical History:   Procedure Laterality Date     SECTION      x 1    CHOLECYSTECTOMY  2016    COLONOSCOPY  2019    to be done in 5 years ()    COLONOSCOPY  2016    GALLBLADDER SURGERY      KNEE SURGERY      MAMMO (HISTORICAL) Bilateral 2022    NORMAL    TUBAL LIGATION       Social History   Social History     Substance and Sexual Activity   Alcohol Use Yes    Comment: Social drinker     Social History     Substance and Sexual Activity   Drug Use No     Social History     Tobacco Use   Smoking Status Never   Smokeless Tobacco Never     Family History   Problem Relation Age of Onset    Rheum arthritis Mother     Lupus Mother     Arthritis Mother         Rheumatoid    Cancer Father     Heart disease Father         bypass    Melanoma Father     Liver cancer Maternal Grandmother     No Known Problems Sister     No Known Problems Brother     No Known Problems Son     No Known Problems Daughter     No Known Problems Sister     No Known Problems Daughter     No Known  Problems Daughter     No Known Problems Daughter        Meds/Allergies     (Not in a hospital admission)      Allergies   Allergen Reactions    Bactrim [Sulfamethoxazole-Trimethoprim] Other (See Comments)     Suicidal    Contrast Dye [Iodinated Contrast Media] Rash       Objective     /64   Pulse 70   Temp (!) 97.1 °F (36.2 °C) (Temporal)   Resp 18   SpO2 99%       PHYSICAL EXAM    Gen: NAD  CV: RRR  CHEST: Clear  ABD: soft, NT/ND  EXT: no edema  Neuro: AAO      ASSESSMENT/PLAN:  This is a 49 y.o. year old female here for evaluation of iron deficiency anemia.    PLAN:   Procedure: EGD and colonoscopy.

## 2024-05-09 ENCOUNTER — TELEPHONE (OUTPATIENT)
Age: 49
End: 2024-05-09

## 2024-05-09 DIAGNOSIS — A04.8 HELICOBACTER PYLORI INFECTION: Primary | ICD-10-CM

## 2024-05-09 NOTE — TELEPHONE ENCOUNTER
Patients GI provider:  Dr. Tapia    Number to return call: [phone]    Reason for call: Pt calling with questions about her results and would like to speak to Dr. Tapia not a nurse regarding the results. Please reach back out to the pt    Scheduled procedure/appointment date if applicable: Apt/procedure

## 2024-05-09 NOTE — TELEPHONE ENCOUNTER
Spoke to patient on phone reviewed results of biopsy with patient that showed H. pylori.  Patient aware there is no special precautions she needs to take continue good handwashing.  Patient informed she will be treated with quadruple therapy.  Please place orders for quadruple therapy and then call patient to review how to take medication with her.  I did explain to patient that 1 to 2 months after she completes medication she will need to get a stool sample I would just reinforce this with her and reinforced with her that she needs to hold any PPI for 2 weeks prior to collecting the stool sample.  Thank you

## 2024-05-09 NOTE — TELEPHONE ENCOUNTER
I spoke to patient concerning results and forward message to clinical pod to place order and reach out to patient concerning quadruple therapy. Thank you

## 2024-05-10 RX ORDER — METRONIDAZOLE 250 MG/1
250 TABLET ORAL
Qty: 56 TABLET | Refills: 0 | Status: SHIPPED | OUTPATIENT
Start: 2024-05-10 | End: 2024-05-24

## 2024-05-10 RX ORDER — TETRACYCLINE HYDROCHLORIDE 500 MG/1
500 CAPSULE ORAL
Qty: 56 CAPSULE | Refills: 0 | Status: SHIPPED | OUTPATIENT
Start: 2024-05-10 | End: 2024-05-24

## 2024-05-10 RX ORDER — BISMUTH SUBSALICYLATE 262 MG/1
262 TABLET, CHEWABLE ORAL
Qty: 56 TABLET | Refills: 0 | Status: SHIPPED | OUTPATIENT
Start: 2024-05-10 | End: 2024-05-24

## 2024-05-10 NOTE — TELEPHONE ENCOUNTER
Quad therapy for H Pylori ordered along with H Pylori stool study to complete one month after treatment.  Patient already has pantoprazole 40 mg at home and will increase to twice a day for fourteen days and then resume daily dose.    She will hold the pantoprazole for two weeks prior to submitting stool sample. She knows that will not affect her current prescription quantity with those instructions.    [facility]. She will be leaving for [place] to visit family. I reviewed she does not need to start this treatment until her return since she is concerned about possible side effects.    Please sign off on other orders.

## 2024-05-20 ENCOUNTER — OFFICE VISIT (OUTPATIENT)
Dept: URGENT CARE | Facility: CLINIC | Age: 49
End: 2024-05-20
Payer: COMMERCIAL

## 2024-05-20 VITALS — DIASTOLIC BLOOD PRESSURE: 63 MMHG | HEART RATE: 66 BPM | RESPIRATION RATE: 16 BRPM | SYSTOLIC BLOOD PRESSURE: 130 MMHG

## 2024-05-20 DIAGNOSIS — M54.50 ACUTE BILATERAL LOW BACK PAIN WITHOUT SCIATICA: Primary | ICD-10-CM

## 2024-05-20 PROCEDURE — 96372 THER/PROPH/DIAG INJ SC/IM: CPT | Performed by: NURSE PRACTITIONER

## 2024-05-20 PROCEDURE — G0382 LEV 3 HOSP TYPE B ED VISIT: HCPCS | Performed by: NURSE PRACTITIONER

## 2024-05-20 RX ORDER — KETOROLAC TROMETHAMINE 30 MG/ML
30 INJECTION, SOLUTION INTRAMUSCULAR; INTRAVENOUS ONCE
Status: COMPLETED | OUTPATIENT
Start: 2024-05-20 | End: 2024-05-20

## 2024-05-20 RX ORDER — CYCLOBENZAPRINE HCL 5 MG
5 TABLET ORAL 3 TIMES DAILY PRN
Qty: 15 TABLET | Refills: 0 | Status: SHIPPED | OUTPATIENT
Start: 2024-05-20

## 2024-05-20 RX ADMIN — KETOROLAC TROMETHAMINE 30 MG: 30 INJECTION, SOLUTION INTRAMUSCULAR; INTRAVENOUS at 12:22

## 2024-05-20 NOTE — PATIENT INSTRUCTIONS
Continue tylenol and heat to the back   Flexeril as needed for muscle spasms- this may cause drowsiness. For home use only   May use over the counter lidocaine and heat patches   Gentle stretches  Follow up with your PCP    Back Pain   WHAT YOU NEED TO KNOW:   Back pain is common. You may have back pain and muscle spasms. You may feel sore or stiff on one or both sides of your back. The pain may spread to your lower body. Conditions that affect the spine, joints, or muscles can cause back pain. These may include arthritis, spinal stenosis (narrowing of the spinal column), muscle tension, or breakdown of the spinal discs.  DISCHARGE INSTRUCTIONS:   Call your local emergency number (911 in the [place]) if:   You have severe back pain with chest pain.    You cannot control your urine or bowel movements.    Your pain becomes so severe that you cannot walk.    Return to the emergency department if:   You have pain, numbness, or weakness in one or both legs.    You have severe back pain, nausea, and vomiting.    You have severe back pain that spreads to your side or genital area.    Call your doctor if:   You have back pain that does not get better with rest and pain medicine.    You have a fever.    You have pain that worsens when you are on your back or when you rest.    You have pain that worsens when you cough or sneeze.    You lose weight without trying.    You have questions or concerns about your condition or care.    Medicines:  You may need any of the following:  NSAIDs  help decrease swelling and pain or fever. This medicine is available with or without a doctor's order. NSAIDs can cause stomach bleeding or kidney problems in certain people. If you take blood thinner medicine, always ask your healthcare provider if NSAIDs are safe for you. Always read the medicine label and follow directions.    Acetaminophen  decreases pain and fever. It is available without a doctor's order. Ask how much to take and how often to  take it. Follow directions. Read the labels of all other medicines you are using to see if they also contain acetaminophen, or ask your doctor or pharmacist. Acetaminophen can cause liver damage if not taken correctly.    Muscle relaxers  help decrease muscle spasms and back pain.    Prescription pain medicine  may be given. Ask your healthcare provider how to take this medicine safely. Some prescription pain medicines contain acetaminophen. Do not take other medicines that contain acetaminophen without talking to your healthcare provider. Too much acetaminophen may cause liver damage. Prescription pain medicine may cause constipation. Ask your healthcare provider how to prevent or treat constipation.     Take your medicine as directed.  Contact your healthcare provider if you think your medicine is not helping or if you have side effects. Tell your provider if you are allergic to any medicine. Keep a list of the medicines, vitamins, and herbs you take. Include the amounts, and when and why you take them. Bring the list or the pill bottles to follow-up visits. Carry your medicine list with you in case of an emergency.    How to manage your back pain:   Apply ice  on your back for 15 to 20 minutes every hour or as directed. Use an ice pack, or put crushed ice in a plastic bag. Cover it with a towel before you apply it to your skin. Ice helps prevent tissue damage and decreases pain.    Apply heat  on your back for 20 to 30 minutes every 2 hours for as many days as directed. Heat helps decrease pain and muscle spasms.    Stay active  as much as you can without causing more pain. Bed rest could make your back pain worse. Avoid heavy lifting until your pain is gone.         Go to physical therapy  as directed. A physical therapist can teach you exercises to help improve movement and strength, and to decrease pain.    Follow up with your doctor in 2 weeks, or as directed:  You might need to see a specialist. Write down  your questions so you remember to ask them during your visits.  © Copyright [employer] 2023 Information is for End User's use only and may not be sold, redistributed or otherwise used for commercial purposes.  The above information is an  only. It is not intended as medical advice for individual conditions or treatments. Talk to your doctor, nurse or pharmacist before following any medical regimen to see if it is safe and effective for you.

## 2024-05-20 NOTE — PROGRESS NOTES
"  [facility]        NAME: Lolis Huber is a 49 y.o. female  : 1975    MRN: [id number]  DATE: May 20, 2024  TIME: 4:49 PM    Assessment and Plan   Acute bilateral low back pain without sciatica [M54.50]  1. Acute bilateral low back pain without sciatica  ketorolac (TORADOL) injection 30 mg    cyclobenzaprine (FLEXERIL) 5 mg tablet        Patient was recently diagnosed with H. pylori however she is holding treatment until she returns from a trip.  She was still advised to not take NSAIDs orally.  Toradol 30 mg IM provided in office for back pain relief.  Flexeril sent to pharmacy.  Advised to continue using Tylenol and heat.  Recommended heat and lidocaine patches for her upcoming trip.    Patient Instructions   Continue tylenol and heat to the back   Flexeril as needed for muscle spasms- this may cause drowsiness. For home use only   May use over the counter lidocaine and heat patches   Gentle stretches  Follow up with your PCP    Follow up with PCP in 3-5 days.  Proceed to  ER if symptoms worsen.    Chief Complaint     Chief Complaint   Patient presents with    Back Pain     2 days ago was putting her boot on and when she got up her back \"felt weird\". Later that day had pain when moving or sitting. Took Tylenol abd heating pad.          History of Present Illness       Patient is a 49-year-old female presenting with lower back pain that started 2 days ago.  She states that she was bending forward to put on her boot she developed some pain in her low back.  She states that she then was in the car for an extended period of time going to [place] as a return trip.  She states that the low back pain became worse.  Denies known injury.  Denies radiation of pain.  Denies numbness, tingling, weakness.  Denies bowel or bladder symptoms.    Back Pain  Pertinent negatives include no abdominal pain, fever, numbness or weakness.       Review of Systems   Review of Systems   Constitutional:  Negative for " activity change, chills and fever.   Gastrointestinal:  Negative for abdominal pain.   Musculoskeletal:  Positive for back pain.   Skin:  Negative for color change.   Neurological:  Negative for weakness and numbness.         Current Medications       Current Outpatient Medications:     bismuth subsalicylate (PEPTO BISMOL) 262 MG chewable tablet, Chew 1 tablet (262 mg total) 4 (four) times a day (before meals and at bedtime) for 14 days, Disp: 56 tablet, Rfl: 0    cyclobenzaprine (FLEXERIL) 5 mg tablet, Take 1 tablet (5 mg total) by mouth 3 (three) times a day as needed for muscle spasms, Disp: 15 tablet, Rfl: 0    dicyclomine (BENTYL) 10 mg capsule, Take 1 capsule (10 mg total) by mouth 3 (three) times a day as needed (Abdominal pain and diarrhea), Disp: 90 capsule, Rfl: 0    metroNIDAZOLE (FLAGYL) 250 mg tablet, Take 1 tablet (250 mg total) by mouth 4 (four) times daily (after meals and at bedtime) for 14 days, Disp: 56 tablet, Rfl: 0    pantoprazole (PROTONIX) 40 mg tablet, Take 1 tablet (40 mg total) by mouth daily, Disp: 90 tablet, Rfl: 0    tetracycline (ACHROMYCIN,SUMYCIN) 500 MG capsule, Take 1 capsule (500 mg total) by mouth 4 (four) times daily (after meals and at bedtime) for 14 days, Disp: 56 capsule, Rfl: 0  No current facility-administered medications for this visit.    Current Allergies     Allergies as of 05/20/2024 - Reviewed 05/20/2024   Allergen Reaction Noted    Bactrim [sulfamethoxazole-trimethoprim] Other (See Comments) 01/07/2019    Contrast dye [iodinated contrast media] Rash 12/15/2020            The following portions of the patient's history were reviewed and updated as appropriate: allergies, current medications, past family history, past medical history, past social history, past surgical history and problem list.     Past Medical History:   Diagnosis Date    Abnormal Pap smear of cervix     Alcohol intoxication (HCC) 08/19/2021    ED visit    Allergies     bactrim, IV contrast dye     Double vision 2020    Last assessed 2021    Enlarged pituitary gland (HCC)     Excessive crying of adult 2020    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 2020    Last assessed 2023    Lateral epicondylitis of left elbow 2019    Last assessed 2019    New daily persistent headache 2020    Last assessed 12/15/2020    Pre-operative clearance 2023    Shoulder pain, right 2019       Past Surgical History:   Procedure Laterality Date     SECTION      x 1    CHOLECYSTECTOMY  2016    COLONOSCOPY  2019    to be done in 5 years ()    COLONOSCOPY  2016    GALLBLADDER SURGERY      KNEE SURGERY      MAMMO (HISTORICAL) Bilateral 2022    NORMAL    TUBAL LIGATION         Family History   Problem Relation Age of Onset    Rheum arthritis Mother     Lupus Mother     Arthritis Mother         Rheumatoid    Cancer Father     Heart disease Father         bypass    Melanoma Father     Liver cancer Maternal Grandmother     No Known Problems Sister     No Known Problems Brother     No Known Problems Son     No Known Problems Daughter     No Known Problems Sister     No Known Problems Daughter     No Known Problems Daughter     No Known Problems Daughter          Medications have been verified.        Objective   /63   Pulse 66   Resp 16        Physical Exam     Physical Exam  Vitals reviewed.   Constitutional:       General: She is awake. She is not in acute distress.     Appearance: Normal appearance. She is normal weight.   HENT:      Head: Normocephalic.   Cardiovascular:      Rate and Rhythm: Normal rate.   Pulmonary:      Effort: Pulmonary effort is normal.   Musculoskeletal:        Back:    Neurological:      Mental Status: She is alert.   Psychiatric:         Behavior: Behavior is cooperative.

## 2024-05-29 ENCOUNTER — TELEPHONE (OUTPATIENT)
Age: 49
End: 2024-05-29

## 2024-05-29 NOTE — TELEPHONE ENCOUNTER
Pt. Calling for advice on H. Pylori treatment, reviewed medications and that it was safe to take allergy medication with quad therapy, also advised that after the 14 days she is to return to Protonix 40 mg daily and hold for 1 week prior to submitting stool test, upon completing test she can resume Protonix 40 mg daily, pt. Verbalized understanding

## 2024-05-29 NOTE — TELEPHONE ENCOUNTER
Patient returned call to confirm medication. Confirmed the Flagyl and Metronidazole are the same medications.    Patient also aware that she needs to hold her Pantopraozle/protonix for 2 weeks prior to submitting the stool sample.  Verbalized understanding.

## 2024-07-08 ENCOUNTER — APPOINTMENT (OUTPATIENT)
Dept: LAB | Facility: CLINIC | Age: 49
End: 2024-07-08

## 2024-07-12 ENCOUNTER — APPOINTMENT (OUTPATIENT)
Dept: LAB | Facility: CLINIC | Age: 49
End: 2024-07-12
Payer: COMMERCIAL

## 2024-07-12 DIAGNOSIS — A04.8 HELICOBACTER PYLORI INFECTION: ICD-10-CM

## 2024-07-12 PROCEDURE — 87338 HPYLORI STOOL AG IA: CPT

## 2024-07-16 LAB — H PYLORI AG STL QL IA: NEGATIVE

## 2024-08-09 ENCOUNTER — HOSPITAL ENCOUNTER (OUTPATIENT)
Dept: RADIOLOGY | Facility: HOSPITAL | Age: 49
End: 2024-08-09
Payer: COMMERCIAL

## 2024-08-09 ENCOUNTER — OFFICE VISIT (OUTPATIENT)
Dept: FAMILY MEDICINE CLINIC | Facility: CLINIC | Age: 49
End: 2024-08-09
Payer: COMMERCIAL

## 2024-08-09 VITALS
HEIGHT: 62 IN | WEIGHT: 176 LBS | DIASTOLIC BLOOD PRESSURE: 78 MMHG | HEART RATE: 97 BPM | RESPIRATION RATE: 16 BRPM | SYSTOLIC BLOOD PRESSURE: 122 MMHG | TEMPERATURE: 98.3 F | BODY MASS INDEX: 32.39 KG/M2 | OXYGEN SATURATION: 99 %

## 2024-08-09 DIAGNOSIS — M25.512 CHRONIC LEFT SHOULDER PAIN: Primary | ICD-10-CM

## 2024-08-09 DIAGNOSIS — G89.29 CHRONIC LEFT SHOULDER PAIN: Primary | ICD-10-CM

## 2024-08-09 DIAGNOSIS — M25.512 CHRONIC LEFT SHOULDER PAIN: ICD-10-CM

## 2024-08-09 DIAGNOSIS — G89.29 CHRONIC LEFT SHOULDER PAIN: ICD-10-CM

## 2024-08-09 PROBLEM — R39.9 UTI SYMPTOMS: Status: RESOLVED | Noted: 2024-03-22 | Resolved: 2024-08-09

## 2024-08-09 PROBLEM — K62.5 RECTAL BLEEDING: Status: RESOLVED | Noted: 2024-02-06 | Resolved: 2024-08-09

## 2024-08-09 PROCEDURE — 99213 OFFICE O/P EST LOW 20 MIN: CPT | Performed by: FAMILY MEDICINE

## 2024-08-09 PROCEDURE — 73030 X-RAY EXAM OF SHOULDER: CPT

## 2024-08-09 NOTE — PROGRESS NOTES
"Ambulatory Visit  Name: Lolis Huber      : 1975      MRN: [id number]  Encounter Provider: Viola Alba MD  Encounter Date: 2024   Encounter department: [facility] MEDICINE    Assessment & Plan   1. Chronic left shoulder pain  Assessment & Plan:  Likely impingement syndrome to ortho check xray start PT  Orders:  -     XR shoulder 2+ vw left; Future; Expected date: 2024  -     Ambulatory Referral to Orthopedic Surgery; Future  -     Ambulatory Referral to Physical Therapy; Future       History of Present Illness     Left shoulder pain since  after clipping branches no fall or injury intermittent pain initially now chronic now with pain moving  reaching etc  occasional numbness to hand     Shoulder Pain         Review of Systems   Musculoskeletal:  Positive for arthralgias (left shoulder).       Objective     /78 (BP Location: Left arm, Patient Position: Sitting, Cuff Size: Standard)   Pulse 97   Temp 98.3 °F (36.8 °C) (Tympanic)   Resp 16   Ht 5' 2\" (1.575 m)   Wt 79.8 kg (176 lb)   SpO2 99%   BMI 32.19 kg/m²     Physical Exam  Musculoskeletal:         General: Tenderness (left shoulder tender  subacromial and posterior limited ROM) present.       Administrative Statements           "

## 2024-08-28 ENCOUNTER — OFFICE VISIT (OUTPATIENT)
Dept: OBGYN CLINIC | Facility: CLINIC | Age: 49
End: 2024-08-28
Payer: COMMERCIAL

## 2024-08-28 VITALS
WEIGHT: 176 LBS | HEART RATE: 76 BPM | SYSTOLIC BLOOD PRESSURE: 132 MMHG | HEIGHT: 62 IN | BODY MASS INDEX: 32.39 KG/M2 | DIASTOLIC BLOOD PRESSURE: 78 MMHG

## 2024-08-28 DIAGNOSIS — M75.02 ADHESIVE CAPSULITIS OF LEFT SHOULDER: Primary | ICD-10-CM

## 2024-08-28 PROCEDURE — 99204 OFFICE O/P NEW MOD 45 MIN: CPT | Performed by: ORTHOPAEDIC SURGERY

## 2024-08-28 NOTE — PROGRESS NOTES
[facility] [address] [address] [phone]       Lolis Huber  [id number]  1975    ORTHOPAEDIC SURGERY OUTPATIENT NOTE  2024      HISTORY:  49 y.o. female for initial evaluation of her left shoulder.  She reports in May of this year she was using large pruning drew and felt a pull in her left shoulder.  She is right-hand dominant.  She has been taking Tylenol.  She was seen by her family doctor and had an x-ray.  She reports pain with reaching overhead and across her body in the left shoulder.  No pain in the neck or pain down her arm.  No numbness or tingling.  She has not had any injections. She does endorse night time pain.       Past Medical History:   Diagnosis Date    Abnormal Pap smear of cervix     Alcohol intoxication (HCC) 2021    ED visit    Allergies     bactrim, IV contrast dye    Double vision 2020    Last assessed 2021    Enlarged pituitary gland (HCC)     Excessive crying of adult 2020    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 2020    Last assessed 2023    Lateral epicondylitis of left elbow 2019    Last assessed 2019    New daily persistent headache 2020    Last assessed 12/15/2020    Pre-operative clearance 2023    Shoulder pain, right 2019       Past Surgical History:   Procedure Laterality Date     SECTION      x 1    CHOLECYSTECTOMY  2016    COLONOSCOPY  2019    to be done in 5 years ()    COLONOSCOPY  2016    GALLBLADDER SURGERY      KNEE SURGERY      MAMMO (HISTORICAL) Bilateral 2022    NORMAL    TUBAL LIGATION         Social History     Socioeconomic History    Marital status: /Civil Union     Spouse name: Not on file    Number of children: Not on file    Years of education: Not on file    Highest education level: Not on file   Occupational History    Not on file   Tobacco Use     "Smoking status: Never    Smokeless tobacco: Never   Vaping Use    Vaping status: Never Used   Substance and Sexual Activity    Alcohol use: Yes     Comment: Social drinker    Drug use: No    Sexual activity: Yes     Partners: Male     Birth control/protection: Surgical   Other Topics Concern    Not on file   Social History Narrative    · Occupation:   house wife         Per [employer]      Social Determinants of Health     Financial Resource Strain: Not on file   Food Insecurity: Not on file   Transportation Needs: Not on file   Physical Activity: Not on file   Stress: Not on file   Social Connections: Not on file   Intimate Partner Violence: Not on file   Housing Stability: Not on file       Family History   Problem Relation Age of Onset    Rheum arthritis Mother     Lupus Mother     Arthritis Mother         Rheumatoid    Cancer Father     Heart disease Father         bypass    Melanoma Father     Liver cancer Maternal Grandmother     No Known Problems Sister     No Known Problems Brother     No Known Problems Son     No Known Problems Daughter     No Known Problems Sister     No Known Problems Daughter     No Known Problems Daughter     No Known Problems Daughter         Patient's Medications    No medications on file       Allergies   Allergen Reactions    Bactrim [Sulfamethoxazole-Trimethoprim] Other (See Comments)     Suicidal    Contrast Dye [Iodinated Contrast Media] Rash        /78 (BP Location: Left arm, Patient Position: Sitting, Cuff Size: Standard)   Pulse 76   Ht 5' 2\" (1.575 m)   Wt 79.8 kg (176 lb)   BMI 32.19 kg/m²      REVIEW OF SYSTEMS:  Constitutional: Negative.    HEENT: Negative.    Respiratory: Negative.    Skin: Negative.    Neurological: Negative.    Psychiatric/Behavioral: Negative.  Musculoskeletal: Negative except for that mentioned in the HPI.    /78 (BP Location: Left arm, Patient Position: Sitting, Cuff Size: Standard)   Pulse 76   Ht 5' 2\" (1.575 m)   Wt 79.8 kg (176 lb) "   BMI 32.19 kg/m²   Gen: No acute distress, resting comfortably in bed  HEENT: Eyes clear, moist mucus membranes, hearing intact  Respiratory: No audible wheezing or stridor  Cardiovascular: Well Perfused peripherally, 2+ distal pulse  Abdomen: nondistended, no peritoneal signs     PHYSICAL EXAM:    LEFT SHOULDER:    Appearance: normal    Forward flexion:   140 degrees active, 160 passive   Abduction:  80 degrees   External rotation at 0 degrees:   60 degrees  Internal rotation: sacrum     STRENGTH:  Forward flexion:  5/5   External rotation:  5/5   Internal rotation:  5/5        Speed test: Positive  Yergason's: Negative   Tender to palpation ACJ (acromioclavicular joint): Negative   Tender to palpation LHB (long head of biceps): Negative  Marshall test: positive  Leake test: Positive  Hornblower's: Negative  Lift off: Negative  Belly press: Negative  Bear hug: Negative  External lag sign: Negative  Cross-body adduction: Negative  Sulcus sign: Negative  Eloina's test: Positive  Drop arm test: negative    Radial/median/ulnar nerve intact    <2 sec cap refill        IMAGING: X-ray of the left shoulder was reviewed in PACS.  This demonstrates no acute fracture or subluxation.  No significant degenerative changes.    ASSESSMENT AND PLAN:  49 y.o. female with left shoulder adhesive capsulitis secondary to an injury 3 months ago.  We discussed with the patient conservative management the form of formal physical therapy to work on range of motion and rotator cuff strengthening.  She will perform at least 6 weeks of therapy.  We did discuss this may take a long course of therapy to completely resolve.      Scribe Attestation      I,:  Abdirashid Meza PA-C am acting as a scribe while in the presence of the attending physician.:       I,:  Samantha Barraza personally performed the services described in this documentation    as scribed in my presence.:

## 2024-09-11 ENCOUNTER — EVALUATION (OUTPATIENT)
Dept: PHYSICAL THERAPY | Facility: CLINIC | Age: 49
End: 2024-09-11
Payer: COMMERCIAL

## 2024-09-11 DIAGNOSIS — M75.02 ADHESIVE CAPSULITIS OF LEFT SHOULDER: ICD-10-CM

## 2024-09-11 PROCEDURE — 97530 THERAPEUTIC ACTIVITIES: CPT

## 2024-09-11 PROCEDURE — 97162 PT EVAL MOD COMPLEX 30 MIN: CPT

## 2024-09-11 NOTE — PROGRESS NOTES
PT Evaluation     Today's date: 2024  Patient name: Lolis Huber  : 1975  MRN: [id number]  Referring provider: Abdirashid Meza PA-C  Dx:   Encounter Diagnosis     ICD-10-CM    1. Adhesive capsulitis of left shoulder  M75.02 Ambulatory referral to Physical Therapy                     Assessment  Impairments: abnormal coordination, abnormal muscle firing, abnormal or restricted ROM, abnormal movement, activity intolerance, impaired physical strength, lacks appropriate home exercise program, pain with function and scapular dyskinesis    Assessment details: Lolis Huber is a pleasant 49 y.o. female presents with signs and symptoms consistent with:   Impaired motor control of L shoulder    Problem List:  1) Tightness in posterior capsule  2) Scapular stabilizer weakness    Comparable signs:  1) Flexion  2) Abduction    she has stiffness in posterior capsule, weakness in scapular stabilizers, and impaired motor control  resulting in worry over not knowing what's wrong and fear of not being able to keep active. These impairments listed above are preventing the patient from participating in functional activity. No further referral appears necessary at this time based upon examination results, and is negative for any red flags. Prognosis is good given HEP compliance and attendance to physical therapy 2x a week.  Positive prognostic indicators include positive attitude toward recovery and good understanding of diagnosis and treatment plan options.  Negative prognostic indicators include chronicity of symptoms.  Patent will benefit from skilled physical therapy at this time to address deficits to improve overall function and return to PLOF. Patient verbalized understanding of POC, HEP, and return demonstrated HEP. All questions were answered to patients satisfaction.     Please contact me if you have any questions or recommendations. Thank you for the referral and the opportunity to share in Lolis Huber's  care.        Understanding of Dx/Px/POC: good     Prognosis: good    Goals  Impairment Goals 4-6 weeks  In order to improve and return to PLOF patient will be able to...  - Decrease pain frequency/intensity/duration to 2/10  - Demonstrate symmetrical shoulder ROM with non involved shoulder without compensations  - Increase shoulder strength to 5/5 throughout  - Increase scapular strength to 5/5 throughout      Functional Goals 6-8 weeks  In order to improve and return to PLOF will be able to...  - Participate in functional activities with no greater than 2/10 pain.  - Increase Functional Status Measure (FOTO) to: predicted outcome  - Be independent and compliant with HEP  - Participate in functional activities with good motor control.  - Reach overhead without increased pain/compensation/difficulty  - Reach behind back without increased pain/compensation/difficulty   - Wash hair without increased pain/compensation/difficulty             Plan  Patient would benefit from: skilled PT  Planned modality interventions: cryotherapy and electrical stimulation/Russian stimulation    Planned therapy interventions: joint mobilization, manual therapy, neuromuscular re-education, patient education, strengthening, stretching, therapeutic activities, therapeutic exercise, home exercise program, functional ROM exercises and postural training    Frequency: 2x week (2-3x week)  Duration in weeks: 8  Treatment plan discussed with: patient      Subjective Evaluation    History of Present Illness  Mechanism of injury: Patient presents to PT today with L shoulder pain. She reported that she was chopping tree branches mid spring early summer. She had some pain. She reported constant off and on pain. She reports reaching across her body, reaching behind her back, reaching overhead She reports end range pain with motion, however no pain at rest. She denies numbness and tingling now, but it was present. She reports that X-rays were negative.    Patient Goals  Patient goals for therapy: decreased pain and independence with ADLs/IADLs  Patient goal: for the pain to stop  Pain  Current pain ratin  At worst pain ratin  Location: end range  Quality: dull ache and pulling  Aggravating factors: overhead activity      Diagnostic Tests  X-ray: normal  Treatments  Previous treatment: physical therapy      Objective     Cervical/Thoracic Screen   Cervical range of motion within normal limits with the following exceptions: Cervical motion WNL: except L rotation 65, R rotation 70    Active Range of Motion   Left Shoulder   Flexion: 160 degrees with pain  Abduction: 170 degrees with pain  External rotation BTH: T3   Internal rotation BTB: T8     Right Shoulder   Normal active range of motion    Additional Active Range of Motion Details  End range pulling noted.     Joint Play   Left Shoulder  Hypomobile in the posterior capsule, inferior capsule, cervical spine and thoracic spine.    Strength/Myotome Testing     Left Shoulder     Planes of Motion   Flexion: 4-   Extension: 4   Abduction: 4-   External rotation at 0°: 4   Internal rotation at 0°: 4     Isolated Muscles   Lower trapezius: 4-   Middle trapezius: 4   Rhomboids: 4-   Serratus anterior: 4     Tests     Left Shoulder   Positive ULTT1 and ULTT2.   Negative belly press, drop arm, empty can, external rotation lag sign, full can, Hawkin's, horn blower, internal rotation lag sign and painful arc.                POC Expires Auth Status Start Date Expiration Date PT Visit Limit    10/11 NA   N/a 40   Date        Used 1       Remaining           Diagnosis:  L shoulder motor control    Precautions:  None   Comparable signs 1) Lifting arm  2) Abduction    Primary Impairments: 1) Hypomobility in posterior capsule  2)  Impaired motor control    Patient Goals Improve pain   Manual Therapy         Posterior capsule                                    Re-evaluation          Exercise Diary           Therapeutic Exercise         UBE/pullies         Retraction          Nerve glides         Post capsule st                                    Neuromuscular Re-education         Scap motor IYT         LT lift offs         Wall slides                                                      Therapeutic Activities         Education  POC, diagnosis, expecations                                            Modalities

## 2024-09-12 ENCOUNTER — APPOINTMENT (OUTPATIENT)
Dept: PHYSICAL THERAPY | Facility: CLINIC | Age: 49
End: 2024-09-12
Payer: COMMERCIAL

## 2024-09-18 ENCOUNTER — OFFICE VISIT (OUTPATIENT)
Dept: PHYSICAL THERAPY | Facility: CLINIC | Age: 49
End: 2024-09-18
Payer: COMMERCIAL

## 2024-09-18 DIAGNOSIS — M75.02 ADHESIVE CAPSULITIS OF LEFT SHOULDER: Primary | ICD-10-CM

## 2024-09-18 PROCEDURE — 97112 NEUROMUSCULAR REEDUCATION: CPT

## 2024-09-18 PROCEDURE — 97110 THERAPEUTIC EXERCISES: CPT

## 2024-09-18 PROCEDURE — 97140 MANUAL THERAPY 1/> REGIONS: CPT

## 2024-09-18 NOTE — PROGRESS NOTES
Daily Note     Today's date: 2024  Patient name: Lolis Huber  : 1975  MRN: [id number]  Referring provider: Abdirashid Meza PA-C  Dx:   Encounter Diagnosis     ICD-10-CM    1. Adhesive capsulitis of left shoulder  M75.02           Subjective: No complaints upon arrival. Main complaint is mild pain with OH reaching.      Objective: See treatment diary below      Assessment: Pt demo some decreased motor control and weakness on L side compared to R. Patient demo noted challenge and fatigue with prone scap stabilization exercises, especially with Y. Demo some UT compensation especially as she fatigues, but overall able to demo good muscle recruitment. Updated and reviewed HEP.      Plan: Continue per plan of care.  Progress treatment as tolerated.         POC Expires Auth Status Start Date Expiration Date PT Visit Limit    10/11 NA   N/a 40   Date        Used 1       Remaining           Diagnosis:  L shoulder motor control    Precautions:  None   Comparable signs 1) Lifting arm  2) Abduction    Primary Impairments: 1) Hypomobility in posterior capsule  2)  Impaired motor control    Patient Goals Improve pain   Manual Therapy        Posterior capsule  PROM, [facility]C                                  Re-evaluation          Exercise Diary          Therapeutic Exercise         UBE/pullies  Pulleys 2'/2'       Retraction   5'', 10x       Nerve glides         Post capsule st  20''x5                                  Neuromuscular Re-education         Scap motor IYT  I/T-2x10  Y-10x       LT lift offs         Wall slides  Flex/abd 5'', 10x                                                    Therapeutic Activities         Education  POC, diagnosis, expecations                                            Modalities

## 2024-09-19 ENCOUNTER — APPOINTMENT (OUTPATIENT)
Dept: PHYSICAL THERAPY | Facility: CLINIC | Age: 49
End: 2024-09-19
Payer: COMMERCIAL

## 2024-09-23 ENCOUNTER — OFFICE VISIT (OUTPATIENT)
Dept: PHYSICAL THERAPY | Facility: CLINIC | Age: 49
End: 2024-09-23
Payer: COMMERCIAL

## 2024-09-23 DIAGNOSIS — M75.02 ADHESIVE CAPSULITIS OF LEFT SHOULDER: Primary | ICD-10-CM

## 2024-09-23 PROCEDURE — 97140 MANUAL THERAPY 1/> REGIONS: CPT

## 2024-09-23 PROCEDURE — 97110 THERAPEUTIC EXERCISES: CPT

## 2024-09-23 PROCEDURE — 97112 NEUROMUSCULAR REEDUCATION: CPT

## 2024-09-23 NOTE — PROGRESS NOTES
"Daily Note     Today's date: 2024  Patient name: Lolis Huber  : 1975  MRN: [id number]  Referring provider: Abdirashid Meza PA-C  Dx:   Encounter Diagnosis     ICD-10-CM    1. Adhesive capsulitis of left shoulder  M75.02           Subjective: Mild pain reported to this date. She stated that she was helping her mom and her shoulder was really bothering her.       Objective: See treatment diary below      Assessment: Pt demo increased difficulty with shoulder abduction to this date especially with motor control. Difficulty achieving end range especially with I and Y. Continue to address scapular stabilizers moving forward. Updated and reviewed HEP.      Plan: Continue per plan of care.  Progress treatment as tolerated.         POC Expires Auth Status Start Date Expiration Date PT Visit Limit    10/11 NA   N/a 40   Date        Used 1       Remaining           Diagnosis:  L shoulder motor control    Precautions:  None   Comparable signs 1) Lifting arm  2) Abduction    Primary Impairments: 1) Hypomobility in posterior capsule  2)  Impaired motor control    Patient Goals Improve pain   Manual Therapy       Posterior capsule  PROM, [facility] SP GII                                 Re-evaluation          Exercise Diary          Therapeutic Exercise         UBE/pullies  Pulleys 2'/2' 2/2      Retraction   5'', 10x 5\" 10x      Nerve glides   20x       Post capsule st  20''x5 10x10\"      T/s extension   2x10      Rows/LPD   10# 2x10 ea               Neuromuscular Re-education         Scap motor IYT  I/T-2x10  Y-10x I/T 2x10      LT lift offs         Wall slides  Flex/abd 5'', 10x Flex with PTB 2x10      ER/IR    2x10 PTB                                          Therapeutic Activities         Education  POC, diagnosis, expecations                                            Modalities                            "

## 2024-09-25 ENCOUNTER — APPOINTMENT (OUTPATIENT)
Dept: PHYSICAL THERAPY | Facility: CLINIC | Age: 49
End: 2024-09-25
Payer: COMMERCIAL

## 2024-09-27 ENCOUNTER — RA CDI HCC (OUTPATIENT)
Dept: OTHER | Facility: HOSPITAL | Age: 49
End: 2024-09-27

## 2024-10-01 ENCOUNTER — OFFICE VISIT (OUTPATIENT)
Dept: PHYSICAL THERAPY | Facility: CLINIC | Age: 49
End: 2024-10-01
Payer: COMMERCIAL

## 2024-10-01 DIAGNOSIS — M75.02 ADHESIVE CAPSULITIS OF LEFT SHOULDER: Primary | ICD-10-CM

## 2024-10-01 PROCEDURE — 97110 THERAPEUTIC EXERCISES: CPT

## 2024-10-01 PROCEDURE — 97112 NEUROMUSCULAR REEDUCATION: CPT

## 2024-10-01 NOTE — PROGRESS NOTES
"Daily Note     Today's date: 10/1/2024  Patient name: Lolis Huber  : 1975  MRN: [id number]  Referring provider: Abdirashid Meza PA-C  Dx:   Encounter Diagnosis     ICD-10-CM    1. Adhesive capsulitis of left shoulder  M75.02             Subjective: Reports shoulder feeling much better. Notes improvements in both strength and motion.      Objective: See treatment diary below      Assessment: Pt able to demo improve scapular recruitment with prone exercises. Does demo fatigue with repetition, cueing to avoid UT compensation. Progressed to include additional scapular stabilization and shoulder girdle strengthening. Patient able to complete without complaints, but noted fatigue.      Plan: Continue per plan of care.  Progress treatment as tolerated.         POC Expires Auth Status Start Date Expiration Date PT Visit Limit    10/11 NA   N/a 40   Date        Used 1       Remaining           Diagnosis:  L shoulder motor control    Precautions:  None   Comparable signs 1) Lifting arm  2) Abduction    Primary Impairments: 1) Hypomobility in posterior capsule  2)  Impaired motor control    Patient Goals Improve pain   Manual Therapy 9/11 9/18 9/23 10/1     Posterior capsule  PROM, [facility] SP GII                                 Re-evaluation          Exercise Diary          Therapeutic Exercise         UBE/pullies  Pulleys 2'/2' 2/2 UBE 2/2     Retraction   5'', 10x 5\" 10x 5'', 20x     Nerve glides   20x       Post capsule st  20''x5 10x10\"      T/s extension   2x10      Rows/LPD   10# 2x10 ea 10# 2x10              Neuromuscular Re-education         Scap motor IYT  I/T-2x10  Y-10x I/T 2x10 I/T 2x10     LT lift offs         Wall slides  Flex/abd 5'', 10x Flex with PTB 2x10 Flex w/ FR, PTB 2x10     ER/IR    2x10 PTB RTB 2x10     No monies    RTB 2x10     ER to the Lord    Pink TB 2x10                       Therapeutic Activities         Education  POC, diagnosis, expecations                                         "    Modalities

## 2024-10-03 ENCOUNTER — APPOINTMENT (OUTPATIENT)
Dept: PHYSICAL THERAPY | Facility: CLINIC | Age: 49
End: 2024-10-03
Payer: COMMERCIAL

## 2024-10-03 ENCOUNTER — TELEMEDICINE (OUTPATIENT)
Dept: FAMILY MEDICINE CLINIC | Facility: CLINIC | Age: 49
End: 2024-10-03
Payer: COMMERCIAL

## 2024-10-03 ENCOUNTER — TELEPHONE (OUTPATIENT)
Dept: FAMILY MEDICINE CLINIC | Facility: CLINIC | Age: 49
End: 2024-10-03

## 2024-10-03 DIAGNOSIS — Z78.0 MENOPAUSE: ICD-10-CM

## 2024-10-03 DIAGNOSIS — E66.811 CLASS 1 OBESITY WITHOUT SERIOUS COMORBIDITY WITH BODY MASS INDEX (BMI) OF 32.0 TO 32.9 IN ADULT, UNSPECIFIED OBESITY TYPE: Primary | ICD-10-CM

## 2024-10-03 PROCEDURE — 99214 OFFICE O/P EST MOD 30 MIN: CPT | Performed by: INTERNAL MEDICINE

## 2024-10-03 RX ORDER — TIRZEPATIDE 7.5 MG/.5ML
7.5 INJECTION, SOLUTION SUBCUTANEOUS WEEKLY
Qty: 2 ML | Refills: 0 | Status: SHIPPED | OUTPATIENT
Start: 2024-11-28 | End: 2024-12-26

## 2024-10-03 RX ORDER — TIRZEPATIDE 5 MG/.5ML
5 INJECTION, SOLUTION SUBCUTANEOUS WEEKLY
Qty: 2 ML | Refills: 0 | Status: SHIPPED | OUTPATIENT
Start: 2024-10-31 | End: 2024-11-28

## 2024-10-03 RX ORDER — TIRZEPATIDE 15 MG/.5ML
15 INJECTION, SOLUTION SUBCUTANEOUS WEEKLY
Qty: 6 ML | Refills: 0 | Status: SHIPPED | OUTPATIENT
Start: 2025-02-20

## 2024-10-03 RX ORDER — TIRZEPATIDE 10 MG/.5ML
10 INJECTION, SOLUTION SUBCUTANEOUS WEEKLY
Qty: 2 ML | Refills: 0 | Status: SHIPPED | OUTPATIENT
Start: 2024-12-26 | End: 2025-01-23

## 2024-10-03 RX ORDER — TIRZEPATIDE 2.5 MG/.5ML
2.5 INJECTION, SOLUTION SUBCUTANEOUS WEEKLY
Qty: 2 ML | Refills: 0 | Status: SHIPPED | OUTPATIENT
Start: 2024-10-03

## 2024-10-03 RX ORDER — TIRZEPATIDE 12.5 MG/.5ML
12.5 INJECTION, SOLUTION SUBCUTANEOUS WEEKLY
Qty: 2 ML | Refills: 0 | Status: SHIPPED | OUTPATIENT
Start: 2025-01-23 | End: 2025-02-20

## 2024-10-03 NOTE — TELEPHONE ENCOUNTER
Had a virtual visit earlier today & said Zepbound hasn't been ordered to the pharmacy yet, or if it has been, she hasn't heard anything from them yet?

## 2024-10-03 NOTE — PROGRESS NOTES
Virtual Regular Visit  Name: Lolis Huber      : 1975      MRN: [id number]  Encounter Provider: Marsha Green MD  Encounter Date: 10/3/2024   Encounter department: [facility]    Verification of patient location:    Patient is located at Home in the following state in which I hold an active license [place]    Assessment & Plan  Class 1 obesity without serious comorbidity with body mass index (BMI) of 32.0 to 32.9 in adult, unspecified obesity type    Orders:    Zepbound 2.5 MG/0.5ML auto-injector; Inject 0.5 mL (2.5 mg total) under the skin once a week    Zepbound 5 MG/0.5ML auto-injector; Inject 0.5 mL (5 mg total) under the skin once a week for 28 days Do not start before 2024.    Zepbound 7.5 MG/0.5ML auto-injector; Inject 0.5 mL (7.5 mg total) under the skin once a week for 28 days Do not start before 2024.    Zepbound 10 MG/0.5ML auto-injector; Inject 0.5 mL (10 mg total) under the skin once a week for 28 days Do not start before 2024.    Zepbound 12.5 MG/0.5ML auto-injector; Inject 0.5 mL (12.5 mg total) under the skin once a week for 28 days Do not start before 2025.    Zepbound 15 MG/0.5ML auto-injector; Inject 0.5 mL (15 mg total) under the skin once a week Do not start before 2025.    Menopause            Had long conversation with Lolis today about menopause, symptoms, weight gain-she would like to try one of the GLP 1s to help her lose weight-wrtoe for Zepbound. Told her the people who do the best with this are those who combine it with a diet and exerfcise program. She does NOT want anythig for her depressed mood at this time. Also went over risks and side effects of meds  Encounter provider Marsha Green MD    The patient was identified by name and date of birth. Lolis Huber was informed that this is a telemedicine visit and that the visit is being conducted through the Epic Embedded platform. She  agrees to proceed..  My office door was closed. No one else was in the room.  She acknowledged consent and understanding of privacy and security of the video platform. The patient has agreed to participate and understands they can discontinue the visit at any time.    Patient is aware this is a billable service.     History of Present Illness     Lolis called in because she's been struggling with weight gain for awhile now, since she's been in menopause. She's been in menopause for a year now and is having a hard time with fatigue, weight loss, sweats, moods being down. Doesn't feel like walking or leaving the house. I did discuss the idea of meds for that too but she feels that if she can get her weight under control she'll feel better and less down overall.  She's interested in the GLP1s         History obtained from : patient  Review of Systems   Constitutional:  Positive for unexpected weight change.   Endocrine: Positive for heat intolerance.   Psychiatric/Behavioral:  Positive for dysphoric mood.      Medical History Reviewed by provider this encounter:           Objective     There were no vitals taken for this visit.  Physical Exam  Constitutional:       Appearance: She is obese.   HENT:      Head: Normocephalic and atraumatic.   Pulmonary:      Effort: Pulmonary effort is normal.   Musculoskeletal:         General: Normal range of motion.   Neurological:      Mental Status: She is alert.   Psychiatric:         Mood and Affect: Mood normal.         Thought Content: Thought content normal.         Visit Time  Total Visit Duration: 20 min

## 2024-10-04 ENCOUNTER — TELEPHONE (OUTPATIENT)
Age: 49
End: 2024-10-04

## 2024-10-04 ENCOUNTER — OFFICE VISIT (OUTPATIENT)
Dept: PHYSICAL THERAPY | Facility: CLINIC | Age: 49
End: 2024-10-04
Payer: COMMERCIAL

## 2024-10-04 DIAGNOSIS — M75.02 ADHESIVE CAPSULITIS OF LEFT SHOULDER: Primary | ICD-10-CM

## 2024-10-04 PROCEDURE — 97112 NEUROMUSCULAR REEDUCATION: CPT

## 2024-10-04 PROCEDURE — 97110 THERAPEUTIC EXERCISES: CPT

## 2024-10-04 NOTE — PROGRESS NOTES
"Daily Note     Today's date: 10/4/2024  Patient name: Lolis Huber  : 1975  MRN: [id number]  Referring provider: Abdirashid Meza PA-C  Dx:   Encounter Diagnosis     ICD-10-CM    1. Adhesive capsulitis of left shoulder  M75.02             Subjective: Reports shoulder feeling better. Notes soreness after last visit. States she did some cleaning, notes shoulder soreness upon arrival.      Objective: See treatment diary below      Assessment: Pt able to demo improve scapular recruitment with strengthening exercises. Does demo fatigue with repetition, cueing to avoid UT compensation.Patient able to complete without complaints, but noted fatigue. Challenged and noted fatigue with LT lift offs.      Plan: Continue per plan of care.  Progress treatment as tolerated.         POC Expires Auth Status Start Date Expiration Date PT Visit Limit    10/11 NA   N/a 40   Date        Used 1       Remaining           Diagnosis:  L shoulder motor control    Precautions:  None   Comparable signs 1) Lifting arm  2) Abduction    Primary Impairments: 1) Hypomobility in posterior capsule  2)  Impaired motor control    Patient Goals Improve pain   Manual Therapy 9/11 9/18 9/23 10/1 10/4    Posterior capsule  PROM, [facility] SP GII                                 Re-evaluation          Exercise Diary          Therapeutic Exercise         UBE/pullies  Pulleys 2'/2' 2/2 UBE 2/2 UBE 2/2    Retraction   5'', 10x 5\" 10x 5'', 20x     Nerve glides   20x       Post capsule st  20''x5 10x10\"      T/s extension   2x10      Rows/LPD   10# 2x10 ea 10# 2x10 Blue TB 2x10             Neuromuscular Re-education         Scap motor IYT  I/T-2x10  Y-10x I/T 2x10 I/T 2x10 I/T 2x10  Y-2x10    LT lift offs     2x10    Wall slides  Flex/abd 5'', 10x Flex with PTB 2x10 Flex w/ FR, PTB 2x10     ER/IR    2x10 PTB RTB 2x10 RTB 2x10    hitchers     RTB 2x10    No monies    RTB 2x10 RTB 2x10    ER to the Lord    Pink TB 2x10 Pink TB 2x10    Scap stab w/ ball   "   ABC's, red MB, 1x             Therapeutic Activities         Education  POC, diagnosis, expecations                                            Modalities

## 2024-10-04 NOTE — TELEPHONE ENCOUNTER
[place] Zepbound 2.5 MG/0.5ML SUBMITTED     via    [x]CMM-KEY:  [id number]  []Surescripts-Case ID #    []Availity-Auth ID #  NDC #    []Faxed to plan   []Other website    []Phone call Case ID #      Office notes sent, clinical questions answered. Awaiting determination    Turnaround time for your insurance to make a decision on your Prior Authorization can take 7-21 business days.

## 2024-10-04 NOTE — TELEPHONE ENCOUNTER
Per patient's phone call, she was informed by the pharmacist that she cannot  Zepbound 2.5 mg as she needs Rx for first month starting dose first. Please send Rx to [facility] on [address].

## 2024-10-07 NOTE — TELEPHONE ENCOUNTER
PA for Zepbound 2.5 MG/0.5ML NOT REQUIRED     Reason (screenshot if applicable)          Patient advised by          [x] MyChart Message  [] Phone call   []LMOM  []L/M to call office as no active Communication consent on file  []Unable to leave detailed message as VM not approved on Communication consent       Pharmacy advised by    [x]Fax  []Phone call

## 2024-10-31 ENCOUNTER — NURSE TRIAGE (OUTPATIENT)
Age: 49
End: 2024-10-31

## 2024-10-31 ENCOUNTER — APPOINTMENT (EMERGENCY)
Dept: CT IMAGING | Facility: HOSPITAL | Age: 49
End: 2024-10-31
Payer: COMMERCIAL

## 2024-10-31 ENCOUNTER — HOSPITAL ENCOUNTER (EMERGENCY)
Facility: HOSPITAL | Age: 49
Discharge: HOME | End: 2024-10-31
Attending: EMERGENCY MEDICINE
Payer: COMMERCIAL

## 2024-10-31 VITALS
TEMPERATURE: 97.6 F | RESPIRATION RATE: 18 BRPM | HEART RATE: 82 BPM | SYSTOLIC BLOOD PRESSURE: 132 MMHG | OXYGEN SATURATION: 100 % | DIASTOLIC BLOOD PRESSURE: 74 MMHG

## 2024-10-31 DIAGNOSIS — N20.1 URETEROLITHIASIS: Primary | ICD-10-CM

## 2024-10-31 LAB
ANION GAP SERPL CALCULATED.3IONS-SCNC: 6 MMOL/L (ref 4–13)
BACTERIA UR QL AUTO: NORMAL /HPF
BASOPHILS # BLD AUTO: 0.02 THOUSANDS/ΜL (ref 0–0.1)
BASOPHILS NFR BLD AUTO: 0 % (ref 0–1)
BILIRUB UR QL STRIP: NEGATIVE
BUN SERPL-MCNC: 9 MG/DL (ref 5–25)
CALCIUM SERPL-MCNC: 9.6 MG/DL (ref 8.4–10.2)
CHLORIDE SERPL-SCNC: 107 MMOL/L (ref 96–108)
CLARITY UR: CLEAR
CO2 SERPL-SCNC: 26 MMOL/L (ref 21–32)
COLOR UR: ABNORMAL
CREAT SERPL-MCNC: 0.74 MG/DL (ref 0.6–1.3)
EOSINOPHIL # BLD AUTO: 0.02 THOUSAND/ΜL (ref 0–0.61)
EOSINOPHIL NFR BLD AUTO: 0 % (ref 0–6)
ERYTHROCYTE [DISTWIDTH] IN BLOOD BY AUTOMATED COUNT: 14.9 % (ref 11.6–15.1)
EXT PREGNANCY TEST URINE: NEGATIVE
EXT. CONTROL: NORMAL
GFR SERPL CREATININE-BSD FRML MDRD: 94 ML/MIN/1.73SQ M
GLUCOSE SERPL-MCNC: 86 MG/DL (ref 65–140)
GLUCOSE UR STRIP-MCNC: NEGATIVE MG/DL
HCT VFR BLD AUTO: 37 % (ref 34.8–46.1)
HGB BLD-MCNC: 11.6 G/DL (ref 11.5–15.4)
HGB UR QL STRIP.AUTO: ABNORMAL
IMM GRANULOCYTES # BLD AUTO: 0.01 THOUSAND/UL (ref 0–0.2)
IMM GRANULOCYTES NFR BLD AUTO: 0 % (ref 0–2)
KETONES UR STRIP-MCNC: NEGATIVE MG/DL
LEUKOCYTE ESTERASE UR QL STRIP: NEGATIVE
LYMPHOCYTES # BLD AUTO: 1.69 THOUSANDS/ΜL (ref 0.6–4.47)
LYMPHOCYTES NFR BLD AUTO: 35 % (ref 14–44)
MCH RBC QN AUTO: 25.8 PG (ref 26.8–34.3)
MCHC RBC AUTO-ENTMCNC: 31.4 G/DL (ref 31.4–37.4)
MCV RBC AUTO: 82 FL (ref 82–98)
MONOCYTES # BLD AUTO: 0.26 THOUSAND/ΜL (ref 0.17–1.22)
MONOCYTES NFR BLD AUTO: 5 % (ref 4–12)
NEUTROPHILS # BLD AUTO: 2.82 THOUSANDS/ΜL (ref 1.85–7.62)
NEUTS SEG NFR BLD AUTO: 60 % (ref 43–75)
NITRITE UR QL STRIP: NEGATIVE
NON-SQ EPI CELLS URNS QL MICRO: NORMAL /HPF
NRBC BLD AUTO-RTO: 0 /100 WBCS
PH UR STRIP.AUTO: 6 [PH]
PLATELET # BLD AUTO: 285 THOUSANDS/UL (ref 149–390)
PMV BLD AUTO: 9.3 FL (ref 8.9–12.7)
POTASSIUM SERPL-SCNC: 3.7 MMOL/L (ref 3.5–5.3)
PROT UR STRIP-MCNC: NEGATIVE MG/DL
RBC # BLD AUTO: 4.49 MILLION/UL (ref 3.81–5.12)
RBC #/AREA URNS AUTO: NORMAL /HPF
SODIUM SERPL-SCNC: 139 MMOL/L (ref 135–147)
SP GR UR STRIP.AUTO: <=1.005 (ref 1–1.03)
UROBILINOGEN UR QL STRIP.AUTO: 0.2 E.U./DL
WBC # BLD AUTO: 4.82 THOUSAND/UL (ref 4.31–10.16)
WBC #/AREA URNS AUTO: NORMAL /HPF

## 2024-10-31 PROCEDURE — 99285 EMERGENCY DEPT VISIT HI MDM: CPT | Performed by: EMERGENCY MEDICINE

## 2024-10-31 PROCEDURE — 36415 COLL VENOUS BLD VENIPUNCTURE: CPT | Performed by: EMERGENCY MEDICINE

## 2024-10-31 PROCEDURE — 96375 TX/PRO/DX INJ NEW DRUG ADDON: CPT

## 2024-10-31 PROCEDURE — 85025 COMPLETE CBC W/AUTO DIFF WBC: CPT | Performed by: EMERGENCY MEDICINE

## 2024-10-31 PROCEDURE — 96374 THER/PROPH/DIAG INJ IV PUSH: CPT

## 2024-10-31 PROCEDURE — 99284 EMERGENCY DEPT VISIT MOD MDM: CPT

## 2024-10-31 PROCEDURE — 74176 CT ABD & PELVIS W/O CONTRAST: CPT

## 2024-10-31 PROCEDURE — 81001 URINALYSIS AUTO W/SCOPE: CPT | Performed by: EMERGENCY MEDICINE

## 2024-10-31 PROCEDURE — 81025 URINE PREGNANCY TEST: CPT | Performed by: EMERGENCY MEDICINE

## 2024-10-31 PROCEDURE — 80048 BASIC METABOLIC PNL TOTAL CA: CPT | Performed by: EMERGENCY MEDICINE

## 2024-10-31 RX ORDER — OXYCODONE HYDROCHLORIDE 5 MG/1
5 TABLET ORAL EVERY 6 HOURS PRN
Qty: 12 TABLET | Refills: 0 | Status: SHIPPED | OUTPATIENT
Start: 2024-10-31 | End: 2024-11-07

## 2024-10-31 RX ORDER — KETOROLAC TROMETHAMINE 30 MG/ML
15 INJECTION, SOLUTION INTRAMUSCULAR; INTRAVENOUS ONCE
Status: DISCONTINUED | OUTPATIENT
Start: 2024-10-31 | End: 2024-10-31 | Stop reason: HOSPADM

## 2024-10-31 RX ORDER — ONDANSETRON 2 MG/ML
4 INJECTION INTRAMUSCULAR; INTRAVENOUS ONCE
Status: COMPLETED | OUTPATIENT
Start: 2024-10-31 | End: 2024-10-31

## 2024-10-31 RX ORDER — HYDROMORPHONE HCL/PF 1 MG/ML
0.5 SYRINGE (ML) INJECTION ONCE
Status: COMPLETED | OUTPATIENT
Start: 2024-10-31 | End: 2024-10-31

## 2024-10-31 RX ORDER — IBUPROFEN 600 MG/1
600 TABLET, FILM COATED ORAL EVERY 8 HOURS PRN
Qty: 15 TABLET | Refills: 0 | Status: SHIPPED | OUTPATIENT
Start: 2024-10-31

## 2024-10-31 RX ADMIN — HYDROMORPHONE HYDROCHLORIDE 0.5 MG: 1 INJECTION, SOLUTION INTRAMUSCULAR; INTRAVENOUS; SUBCUTANEOUS at 10:04

## 2024-10-31 RX ADMIN — ONDANSETRON 4 MG: 2 INJECTION INTRAMUSCULAR; INTRAVENOUS at 10:03

## 2024-10-31 NOTE — ED PROVIDER NOTES
Time reflects when diagnosis was documented in both MDM as applicable and the Disposition within this note       Time User Action Codes Description Comment    10/31/2024  1:56 PM Marisa Prather Add [N20.1] Ureterolithiasis           ED Disposition       ED Disposition   Discharge    Condition   Stable    Date/Time   Thu Oct 31, 2024  1:56 PM    Comment   Lolis Huber discharge to home/self care.                   Assessment & Plan       Medical Decision Making  Amount and/or Complexity of Data Reviewed  Labs: ordered.  Radiology: ordered.    Risk  Prescription drug management.        ED Course as of 10/31/24 1438   Thu Oct 31, 2024   0953 Differential diagnosis includes but is not limited to ureterolithiasis, UTI/pyelonephritis, constipation, colitis, diverticulitis, partial bowel obstruction, ovarian cyst +/- rupture, ovarian torsion.  Given history of tubal ligation doubt pregnancy related.   0958 hCG negative.  Urinalysis without leukocyte esterase or nitrates.  No evidence of UTI.  Trace blood presence.   1109 CBC and BMP unremarkable.  Await CT scan.   1130 Pain improved.  Await CT scan.   1419 Patient remains feeling much improved following analgesia.  Reviewed study results, discharge and follow-up plans.  All questions answered.   Blood pressure much improved following pain control.        Medications   ketorolac (TORADOL) injection 15 mg (15 mg Intravenous Not Given 10/31/24 1411)   ondansetron (ZOFRAN) injection 4 mg (4 mg Intravenous Given 10/31/24 1003)   HYDROmorphone (DILAUDID) injection 0.5 mg (0.5 mg Intravenous Given 10/31/24 1004)       ED Risk Strat Scores                           SBIRT 20yo+      Flowsheet Row Most Recent Value   Initial Alcohol Screen: US AUDIT-C     1. How often do you have a drink containing alcohol? 0 Filed at: 10/31/2024 0923   2. How many drinks containing alcohol do you have on a typical day you are drinking?  0 Filed at: 10/31/2024 0923   3b. FEMALE Any  Age, or MALE 65+: How often do you have 4 or more drinks on one occassion? 0 Filed at: 10/31/2024 0923   Audit-C Score 0 Filed at: 10/31/2024 0923   HARPREET: How many times in the past year have you...    Used an illegal drug or used a prescription medication for non-medical reasons? Never Filed at: 10/31/2024 0923                            History of Present Illness       Chief Complaint   Patient presents with    Abdominal Pain     Pain at 0500 after a BM in LLQ frequent urination and pain increases with urinating. Pt denies any issues recently with constipation.        Past Medical History:   Diagnosis Date    Abnormal Pap smear of cervix     Alcohol intoxication (HCC) 2021    ED visit    Allergies     bactrim, IV contrast dye    Double vision 2020    Last assessed 2021    Enlarged pituitary gland (HCC)     Excessive crying of adult 2020    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 2020    Last assessed 2023    Lateral epicondylitis of left elbow 2019    Last assessed 2019    New daily persistent headache 2020    Last assessed 12/15/2020    Pre-operative clearance 2023    Shoulder pain, right 2019      Past Surgical History:   Procedure Laterality Date     SECTION      x 1    CHOLECYSTECTOMY  2016    COLONOSCOPY  2019    to be done in 5 years ()    COLONOSCOPY  2016    GALLBLADDER SURGERY      KNEE SURGERY      MAMMO (HISTORICAL) Bilateral 2022    NORMAL    TUBAL LIGATION        Family History   Problem Relation Age of Onset    Rheum arthritis Mother     Lupus Mother     Arthritis Mother         Rheumatoid    Cancer Father     Heart disease Father         bypass    Melanoma Father     Liver cancer Maternal Grandmother     No Known Problems Sister     No Known Problems Brother     No Known Problems Son     No Known Problems Daughter     No Known Problems Sister     No Known Problems Daughter     No Known Problems Daughter      No Known Problems Daughter       Social History     Tobacco Use    Smoking status: Never    Smokeless tobacco: Never   Vaping Use    Vaping status: Never Used   Substance Use Topics    Alcohol use: Yes     Comment: Social drinker    Drug use: No      E-Cigarette/Vaping    E-Cigarette Use Never User       E-Cigarette/Vaping Substances    Nicotine No     THC No     CBD No     Flavoring No     Other No     Unknown No       I have reviewed and agree with the history as documented.     Patient is a 49-year-old female presents to the emergency department for evaluation with left lower abdominal discomfort.  She gestures to the left lower abdomen and around to the left lower back as site of pain.  Onset this morning at 530 after experiencing urge to defecate.  She has passed 2 stools today each of which came out slowly.  She did not appreciate any blood presence with them.  Discomfort is very intense in the affected region.  She also notes an urge to urinate frequently.  Upon recent voids did appreciate discomfort radiating in same region.  No dysuria.  No hematuria.  No fevers.  No nausea or vomiting.  No history of similar discomfort previously.  She notes that she has been eating and drinking fairly normally.  For couple of days she consumes slightly less fluid than typical.  She notes that she works to stay well-hydrated given use of Zepbound.  No known personal or family history of ureterolithiasis.        Review of Systems   All other systems reviewed and are negative.          Objective       ED Triage Vitals   Temperature Pulse Blood Pressure Respirations SpO2 Patient Position - Orthostatic VS   10/31/24 0924 10/31/24 0924 10/31/24 0924 10/31/24 0924 10/31/24 0924 10/31/24 0924   97.6 °F (36.4 °C) 82 (!) 151/102 18 100 % Sitting      Temp src Heart Rate Source BP Location FiO2 (%) Pain Score    -- -- 10/31/24 0924 -- 10/31/24 1004      Right arm  2      Vitals      Date and Time Temp Pulse SpO2 Resp BP Pain Score  FACES Pain Rating User   10/31/24 1434 -- -- -- -- 132/74 -- -- CR   10/31/24 1411 -- -- -- -- -- No Pain -- CR   10/31/24 1146 -- -- -- -- -- 2 -- CR   10/31/24 1004 -- -- -- -- -- 2 -- AB   10/31/24 0924 97.6 °F (36.4 °C) 82 100 % 18 151/102 -- -- SG            Physical Exam  Vitals and nursing note reviewed.   Constitutional:       General: She is in acute distress.      Appearance: She is well-developed.      Comments: Uncomfortable appearing, standing slightly hunched over edge of bed   HENT:      Head: Normocephalic.   Cardiovascular:      Rate and Rhythm: Normal rate and regular rhythm.   Pulmonary:      Breath sounds: Normal breath sounds.   Abdominal:      General: Bowel sounds are normal. There is no distension.      Palpations: Abdomen is soft.      Tenderness: There is abdominal tenderness (Left lower quadrant). There is no right CVA tenderness or left CVA tenderness.   Musculoskeletal:         General: Normal range of motion.      Comments: No midline or lateral low back tenderness.   Skin:     General: Skin is warm.   Neurological:      Mental Status: She is alert and oriented to person, place, and time.   Psychiatric:         Mood and Affect: Mood normal.         Results Reviewed       Procedure Component Value Units Date/Time    Basic metabolic panel [[id number]] Collected: 10/31/24 1006    Lab Status: Final result Specimen: Blood from Arm, Left Updated: 10/31/24 1042     Sodium 139 mmol/L      Potassium 3.7 mmol/L      Chloride 107 mmol/L      CO2 26 mmol/L      ANION GAP 6 mmol/L      BUN 9 mg/dL      Creatinine 0.74 mg/dL      Glucose 86 mg/dL      Calcium 9.6 mg/dL      eGFR 94 ml/min/1.73sq m     Narrative:      National Kidney Disease Foundation guidelines for Chronic Kidney Disease (CKD):     Stage 1 with normal or high GFR (GFR > 90 mL/min/1.73 square meters)    Stage 2 Mild CKD (GFR = 60-89 mL/min/1.73 square meters)    Stage 3A Moderate CKD (GFR = 45-59 mL/min/1.73 square meters)    Stage 3B  Moderate CKD (GFR = 30-44 mL/min/1.73 square meters)    Stage 4 Severe CKD (GFR = 15-29 mL/min/1.73 square meters)    Stage 5 End Stage CKD (GFR <15 mL/min/1.73 square meters)  Note: GFR calculation is accurate only with a steady state creatinine    CBC and differential [[id number]]  (Abnormal) Collected: 10/31/24 1006    Lab Status: Final result Specimen: Blood from Arm, Left Updated: 10/31/24 1020     WBC 4.82 Thousand/uL      RBC 4.49 Million/uL      Hemoglobin 11.6 g/dL      Hematocrit 37.0 %      MCV 82 fL      MCH 25.8 pg      MCHC 31.4 g/dL      RDW 14.9 %      MPV 9.3 fL      Platelets 285 Thousands/uL      nRBC 0 /100 WBCs      Segmented % 60 %      Immature Grans % 0 %      Lymphocytes % 35 %      Monocytes % 5 %      Eosinophils Relative 0 %      Basophils Relative 0 %      Absolute Neutrophils 2.82 Thousands/µL      Absolute Immature Grans 0.01 Thousand/uL      Absolute Lymphocytes 1.69 Thousands/µL      Absolute Monocytes 0.26 Thousand/µL      Eosinophils Absolute 0.02 Thousand/µL      Basophils Absolute 0.02 Thousands/µL     Urine Microscopic [[id number]]  (Normal) Collected: 10/31/24 0931    Lab Status: Final result Specimen: Urine, Clean Catch Updated: 10/31/24 0951     RBC, UA None Seen /hpf      WBC, UA None Seen /hpf      Epithelial Cells None Seen /hpf      Bacteria, UA None Seen /hpf     UA w Reflex to Microscopic w Reflex to Culture [[id number]]  (Abnormal) Collected: 10/31/24 0931    Lab Status: Final result Specimen: Urine, Clean Catch Updated: 10/31/24 0951     Color, UA Light Yellow     Clarity, UA Clear     Specific Gravity, UA <=1.005     pH, UA 6.0     Leukocytes, UA Negative     Nitrite, UA Negative     Protein, UA Negative mg/dl      Glucose, UA Negative mg/dl      Ketones, UA Negative mg/dl      Urobilinogen, UA 0.2 E.U./dl      Bilirubin, UA Negative     Occult Blood, UA Trace-Intact    POCT pregnancy, urine [[id number]]  (Normal) Resulted: 10/31/24 0935    Lab Status: Final result  Updated: 10/31/24 0936     EXT Preg Test, Ur Negative     Control Valid            CT renal stone study abdomen pelvis without contrast   Final Interpretation by Noble Webb MD (10/31 1311)      4 mm calculus in the distal left ureter just proximal to the ureterovesicular junction. Mild left hydroureter without hydronephrosis.         Workstation performed: [id number]             Procedures    ED Medication and Procedure Management   Prior to Admission Medications   Prescriptions Last Dose Informant Patient Reported? Taking?   Zepbound 10 MG/0.5ML auto-injector   No No   Sig: Inject 0.5 mL (10 mg total) under the skin once a week for 28 days Do not start before December 26, 2024.   Zepbound 12.5 MG/0.5ML auto-injector   No No   Sig: Inject 0.5 mL (12.5 mg total) under the skin once a week for 28 days Do not start before January 23, 2025.   Zepbound 15 MG/0.5ML auto-injector   No No   Sig: Inject 0.5 mL (15 mg total) under the skin once a week Do not start before February 20, 2025.   Zepbound 2.5 MG/0.5ML auto-injector   No No   Sig: Inject 0.5 mL (2.5 mg total) under the skin once a week   Zepbound 5 MG/0.5ML auto-injector   No No   Sig: Inject 0.5 mL (5 mg total) under the skin once a week for 28 days Do not start before October 31, 2024.   Zepbound 7.5 MG/0.5ML auto-injector   No No   Sig: Inject 0.5 mL (7.5 mg total) under the skin once a week for 28 days Do not start before November 28, 2024.      Facility-Administered Medications: None     Discharge Medication List as of 10/31/2024  2:19 PM        START taking these medications    Details   ibuprofen (MOTRIN) 600 mg tablet Take 1 tablet (600 mg total) by mouth every 8 (eight) hours as needed for moderate pain, Starting Thu 10/31/2024, Normal      oxyCODONE (ROXICODONE) 5 immediate release tablet Take 1 tablet (5 mg total) by mouth every 6 (six) hours as needed for moderate pain or severe pain for up to 7 days Max Daily Amount: 20 mg, Starting Thu  10/31/2024, Until u 11/7/2024 at 2359, Normal           CONTINUE these medications which have NOT CHANGED    Details   Zepbound 10 MG/0.5ML auto-injector Inject 0.5 mL (10 mg total) under the skin once a week for 28 days Do not start before December 26, 2024., Starting Thu 12/26/2024, Until Thu 1/23/2025, Normal      Zepbound 12.5 MG/0.5ML auto-injector Inject 0.5 mL (12.5 mg total) under the skin once a week for 28 days Do not start before January 23, 2025., Starting Thu 1/23/2025, Until Thu 2/20/2025, Normal      Zepbound 15 MG/0.5ML auto-injector Inject 0.5 mL (15 mg total) under the skin once a week Do not start before February 20, 2025., Starting Thu 2/20/2025, Normal      Zepbound 2.5 MG/0.5ML auto-injector Inject 0.5 mL (2.5 mg total) under the skin once a week, Starting Thu 10/3/2024, Normal      Zepbound 5 MG/0.5ML auto-injector Inject 0.5 mL (5 mg total) under the skin once a week for 28 days Do not start before October 31, 2024., Starting Thu 10/31/2024, Until Thu 11/28/2024, Normal      Zepbound 7.5 MG/0.5ML auto-injector Inject 0.5 mL (7.5 mg total) under the skin once a week for 28 days Do not start before November 28, 2024., Starting Thu 11/28/2024, Until Thu 12/26/2024, Normal           No discharge procedures on file.  ED SEPSIS DOCUMENTATION   Time reflects when diagnosis was documented in both MDM as applicable and the Disposition within this note       Time User Action Codes Description Comment    10/31/2024  1:56 PM Marisa Prather Add [N20.1] Ureterolithiasis                  Marisa Prather MD  10/31/24 7809

## 2024-10-31 NOTE — TELEPHONE ENCOUNTER
Regarding: Ab and back pain during BM  ----- Message from Coleen ROWE sent at 10/31/2024  8:06 AM EDT -----  Pt c/o sharp pain in ab and back when making bm starting this morning. No constipation or diarrhea. Pt does have hemorrhoids. I tried to warm transfer but no answer. Please, triage.

## 2024-10-31 NOTE — DISCHARGE INSTRUCTIONS
Drink plenty of fluids to stay well-hydrated.     You may take acetaminophen as directed on over-the-counter packaging and ibuprofen as prescribed if needed for pain with use of oxycodone for severe pain.    Strain all urine to identify when stone passes.    Schedule follow-up appointment with urology.

## 2024-11-01 DIAGNOSIS — E66.811 CLASS 1 OBESITY WITHOUT SERIOUS COMORBIDITY WITH BODY MASS INDEX (BMI) OF 32.0 TO 32.9 IN ADULT, UNSPECIFIED OBESITY TYPE: ICD-10-CM

## 2024-11-01 RX ORDER — TIRZEPATIDE 2.5 MG/.5ML
INJECTION, SOLUTION SUBCUTANEOUS
Qty: 2 ML | Refills: 0 | Status: SHIPPED | OUTPATIENT
Start: 2024-11-01

## 2024-11-01 RX ORDER — TIRZEPATIDE 5 MG/.5ML
INJECTION, SOLUTION SUBCUTANEOUS
Qty: 2 ML | Refills: 0 | Status: SHIPPED | OUTPATIENT
Start: 2024-11-01

## 2024-11-08 ENCOUNTER — TELEPHONE (OUTPATIENT)
Age: 49
End: 2024-11-08

## 2024-11-08 NOTE — TELEPHONE ENCOUNTER
Called to schedule ER f/u but felt like her symptoms resolved and she might've passed the stone so decided to not make an appointment. She said if symptoms return she will call back.

## 2024-11-11 ENCOUNTER — TELEMEDICINE (OUTPATIENT)
Dept: FAMILY MEDICINE CLINIC | Facility: CLINIC | Age: 49
End: 2024-11-11
Payer: COMMERCIAL

## 2024-11-11 DIAGNOSIS — N20.0 NEPHROLITHIASIS: ICD-10-CM

## 2024-11-11 DIAGNOSIS — Z12.31 ENCOUNTER FOR SCREENING MAMMOGRAM FOR MALIGNANT NEOPLASM OF BREAST: ICD-10-CM

## 2024-11-11 DIAGNOSIS — R39.15 URINARY URGENCY: Primary | ICD-10-CM

## 2024-11-11 DIAGNOSIS — N20.0 KIDNEY STONE: ICD-10-CM

## 2024-11-11 DIAGNOSIS — E66.9 OBESITY, UNSPECIFIED CLASS, UNSPECIFIED OBESITY TYPE, UNSPECIFIED WHETHER SERIOUS COMORBIDITY PRESENT: Primary | ICD-10-CM

## 2024-11-11 PROCEDURE — 99214 OFFICE O/P EST MOD 30 MIN: CPT | Performed by: INTERNAL MEDICINE

## 2024-11-11 RX ORDER — ALFUZOSIN HYDROCHLORIDE 10 MG/1
10 TABLET, EXTENDED RELEASE ORAL DAILY
Qty: 30 TABLET | Refills: 0 | Status: SHIPPED | OUTPATIENT
Start: 2024-11-11

## 2024-11-11 RX ORDER — ONDANSETRON 4 MG/1
4 TABLET, FILM COATED ORAL 2 TIMES DAILY PRN
Qty: 30 TABLET | Refills: 0 | Status: SHIPPED | OUTPATIENT
Start: 2024-11-11

## 2024-11-11 NOTE — PROGRESS NOTES
Virtual Regular Visit  Name: Lolis Huber      : 1975      MRN: [id number]  Encounter Provider: Marsha Green MD  Encounter Date: 2024   Encounter department: [facility]    Verification of patient location:    Patient is located at Home in the following state in which I hold an active license [place]    Assessment & Plan  Obesity, unspecified class, unspecified obesity type, unspecified whether serious comorbidity present    Lolis is on Zepbound and seems to be doing fine on that-lost 8 pounds already-will continue that-do NOT think kidney stone is from the Zepbound-Urology sent flomax for her, and I adviised copious hydration and cranberry juice-she's also going to be starting a diet and exercise program  Orders:    ondansetron (ZOFRAN) 4 mg tablet; Take 1 tablet (4 mg total) by mouth 2 (two) times a day as needed for nausea or vomiting    Kidney stone    Orders:    ondansetron (ZOFRAN) 4 mg tablet; Take 1 tablet (4 mg total) by mouth 2 (two) times a day as needed for nausea or vomiting    Encounter for screening mammogram for malignant neoplasm of breast    Orders:    Mammo screening bilateral w 3d and cad; Future         Encounter provider Marsha Green MD    The patient was identified by name and date of birth. Lolis Huber was informed that this is a telemedicine visit and that the visit is being conducted through the Epic Embedded platform. She agrees to proceed..  My office door was closed. No one else was in the room.  She acknowledged consent and understanding of privacy and security of the video platform. The patient has agreed to participate and understands they can discontinue the visit at any time.    Patient is aware this is a billable service.     History of Present Illness     Lolis and I touched base today on her Zepbound start-she's doing well on it, just started the 5 mg dose, but has already lost 8 pounds and is feeling pretty good-she does get  nauseated sor a few days after using the shot, so we discussed that, and I will send prn Zofran rx for her-also, she recently developed some LLQ and flank pain and went to the ER and was told she has a kidney stone 4 mm on the left hand side-it is in the ureter-she michele have pain on that side-        History obtained from : patient  Review of Systems   Constitutional: Negative.    HENT: Negative.     Gastrointestinal:  Positive for abdominal pain and nausea. Negative for abdominal distention.   Genitourinary:  Positive for flank pain.   Skin: Negative.    Hematological: Negative.    Psychiatric/Behavioral: Negative.       Current Outpatient Medications on File Prior to Visit   Medication Sig Dispense Refill    ibuprofen (MOTRIN) 600 mg tablet Take 1 tablet (600 mg total) by mouth every 8 (eight) hours as needed for moderate pain 15 tablet 0    [START ON 12/26/2024] Zepbound 10 MG/0.5ML auto-injector Inject 0.5 mL (10 mg total) under the skin once a week for 28 days Do not start before December 26, 2024. 2 mL 0    [START ON 1/23/2025] Zepbound 12.5 MG/0.5ML auto-injector Inject 0.5 mL (12.5 mg total) under the skin once a week for 28 days Do not start before January 23, 2025. 2 mL 0    [START ON 2/20/2025] Zepbound 15 MG/0.5ML auto-injector Inject 0.5 mL (15 mg total) under the skin once a week Do not start before February 20, 2025. 6 mL 0    Zepbound 2.5 MG/0.5ML auto-injector INJECT 2.5MG SUBCUTANEOUSLY (UNDER THE SKIN) ONCE WEEKLY 2 mL 0    Zepbound 5 MG/0.5ML auto-injector INJECT 5MG SUBCUTANEOUSLY (UNDER THE SKIN) ONCE WEEKLY 2 mL 0    [START ON 11/28/2024] Zepbound 7.5 MG/0.5ML auto-injector Inject 0.5 mL (7.5 mg total) under the skin once a week for 28 days Do not start before November 28, 2024. 2 mL 0     No current facility-administered medications on file prior to visit.          Objective     LMP 02/15/2023   Physical Exam  Constitutional:       Appearance: Normal appearance.   HENT:      Head:  Normocephalic and atraumatic.      Right Ear: External ear normal.      Left Ear: External ear normal.   Pulmonary:      Effort: Pulmonary effort is normal.   Neurological:      General: No focal deficit present.      Mental Status: She is alert and oriented to person, place, and time.   Psychiatric:         Mood and Affect: Mood normal.         Thought Content: Thought content normal.         Judgment: Judgment normal.         Visit Time  Total Visit Duration: 15 minutes

## 2024-11-11 NOTE — TELEPHONE ENCOUNTER
Pt under care of: Nery     Last Seen: 4/9/24     Pt calling due to:     Patient went to ER on 10/31/24 for kidney stones     CT done at ER visit:    IMPRESSION:     4 mm calculus in the distal left ureter just proximal to the ureterovesicular junction. Mild left hydroureter without hydronephrosis.    Patient states she was having a lot of pain in left flank radiating to abdomen. Per patient she is still uncomfortable but not as bad as yesterday. Pain medicine did seem to help but knocked her out.     Patient denies any fever or blood in urine. Did have chills yesterday.     Scheduled: 11/13/24 with Clementina at our [place] office.     Triage nurse will go over ER precautions with patient.     Not sure if there are any other recommendations for patient at this time.

## 2024-11-11 NOTE — TELEPHONE ENCOUNTER
I sent over alfuzosin for patient to take. Unable to take Flomax due to sulfa allergy. Appointment as scheduled and ER precautions. Hydration and NSAIDs

## 2024-11-13 ENCOUNTER — HOSPITAL ENCOUNTER (OUTPATIENT)
Facility: HOSPITAL | Age: 49
Setting detail: OBSERVATION
Discharge: HOME | End: 2024-11-15
Attending: EMERGENCY MEDICINE | Admitting: HOSPITALIST
Payer: COMMERCIAL

## 2024-11-13 ENCOUNTER — OFFICE VISIT (OUTPATIENT)
Dept: UROLOGY | Facility: CLINIC | Age: 49
End: 2024-11-13
Payer: COMMERCIAL

## 2024-11-13 ENCOUNTER — APPOINTMENT (EMERGENCY)
Dept: CT IMAGING | Facility: HOSPITAL | Age: 49
End: 2024-11-13
Payer: COMMERCIAL

## 2024-11-13 VITALS
SYSTOLIC BLOOD PRESSURE: 110 MMHG | BODY MASS INDEX: 30.18 KG/M2 | WEIGHT: 164 LBS | HEIGHT: 62 IN | DIASTOLIC BLOOD PRESSURE: 72 MMHG

## 2024-11-13 DIAGNOSIS — R31.9 HEMATURIA: ICD-10-CM

## 2024-11-13 DIAGNOSIS — N20.1 LEFT URETERAL STONE: Primary | ICD-10-CM

## 2024-11-13 DIAGNOSIS — N20.0 NEPHROLITHIASIS: Primary | ICD-10-CM

## 2024-11-13 PROBLEM — E66.9 OBESITY: Status: ACTIVE | Noted: 2024-11-13

## 2024-11-13 LAB
ALBUMIN SERPL BCG-MCNC: 4.4 G/DL (ref 3.5–5)
ALP SERPL-CCNC: 69 U/L (ref 34–104)
ALT SERPL W P-5'-P-CCNC: 10 U/L (ref 7–52)
ANION GAP SERPL CALCULATED.3IONS-SCNC: 11 MMOL/L (ref 4–13)
APTT PPP: 31 SECONDS (ref 23–34)
AST SERPL W P-5'-P-CCNC: 14 U/L (ref 13–39)
BACTERIA UR QL AUTO: NORMAL /HPF
BASOPHILS # BLD AUTO: 0.03 THOUSANDS/ÂΜL (ref 0–0.1)
BASOPHILS NFR BLD AUTO: 1 % (ref 0–1)
BILIRUB SERPL-MCNC: 0.47 MG/DL (ref 0.2–1)
BILIRUB UR QL STRIP: NEGATIVE
BUN SERPL-MCNC: 12 MG/DL (ref 5–25)
CALCIUM SERPL-MCNC: 9.6 MG/DL (ref 8.4–10.2)
CHLORIDE SERPL-SCNC: 103 MMOL/L (ref 96–108)
CLARITY UR: CLEAR
CO2 SERPL-SCNC: 24 MMOL/L (ref 21–32)
COLOR UR: ABNORMAL
CREAT SERPL-MCNC: 0.81 MG/DL (ref 0.6–1.3)
EOSINOPHIL # BLD AUTO: 0.04 THOUSAND/ÂΜL (ref 0–0.61)
EOSINOPHIL NFR BLD AUTO: 1 % (ref 0–6)
ERYTHROCYTE [DISTWIDTH] IN BLOOD BY AUTOMATED COUNT: 14.8 % (ref 11.6–15.1)
EXT PREGNANCY TEST URINE: NEGATIVE
EXT. CONTROL: NORMAL
GFR SERPL CREATININE-BSD FRML MDRD: 84 ML/MIN/1.73SQ M
GLUCOSE SERPL-MCNC: 94 MG/DL (ref 65–140)
GLUCOSE UR STRIP-MCNC: NEGATIVE MG/DL
HCT VFR BLD AUTO: 36.5 % (ref 34.8–46.1)
HGB BLD-MCNC: 11.3 G/DL (ref 11.5–15.4)
HGB UR QL STRIP.AUTO: ABNORMAL
HOLD SPECIMEN: NORMAL
IMM GRANULOCYTES # BLD AUTO: 0.01 THOUSAND/UL (ref 0–0.2)
IMM GRANULOCYTES NFR BLD AUTO: 0 % (ref 0–2)
INR PPP: 0.99 (ref 0.85–1.19)
KETONES UR STRIP-MCNC: NEGATIVE MG/DL
LACTATE SERPL-SCNC: 2 MMOL/L (ref 0.5–2)
LEUKOCYTE ESTERASE UR QL STRIP: NEGATIVE
LIPASE SERPL-CCNC: 18 U/L (ref 11–82)
LYMPHOCYTES # BLD AUTO: 2.28 THOUSANDS/ÂΜL (ref 0.6–4.47)
LYMPHOCYTES NFR BLD AUTO: 48 % (ref 14–44)
MCH RBC QN AUTO: 25.9 PG (ref 26.8–34.3)
MCHC RBC AUTO-ENTMCNC: 31 G/DL (ref 31.4–37.4)
MCV RBC AUTO: 84 FL (ref 82–98)
MONOCYTES # BLD AUTO: 0.26 THOUSAND/ÂΜL (ref 0.17–1.22)
MONOCYTES NFR BLD AUTO: 6 % (ref 4–12)
NEUTROPHILS # BLD AUTO: 2.07 THOUSANDS/ÂΜL (ref 1.85–7.62)
NEUTS SEG NFR BLD AUTO: 44 % (ref 43–75)
NITRITE UR QL STRIP: NEGATIVE
NON-SQ EPI CELLS URNS QL MICRO: NORMAL /HPF
NRBC BLD AUTO-RTO: 0 /100 WBCS
PH UR STRIP.AUTO: 5.5 [PH]
PLATELET # BLD AUTO: 262 THOUSANDS/UL (ref 149–390)
PMV BLD AUTO: 9 FL (ref 8.9–12.7)
POTASSIUM SERPL-SCNC: 3.2 MMOL/L (ref 3.5–5.3)
PROT SERPL-MCNC: 8.3 G/DL (ref 6.4–8.4)
PROT UR STRIP-MCNC: NEGATIVE MG/DL
PROTHROMBIN TIME: 13.8 SECONDS (ref 12.3–15)
RBC # BLD AUTO: 4.37 MILLION/UL (ref 3.81–5.12)
RBC #/AREA URNS AUTO: NORMAL /HPF
SL AMB  POCT GLUCOSE, UA: NORMAL
SL AMB LEUKOCYTE ESTERASE,UA: NORMAL
SL AMB POCT BILIRUBIN,UA: NORMAL
SL AMB POCT BLOOD,UA: NORMAL
SL AMB POCT CLARITY,UA: CLEAR
SL AMB POCT COLOR,UA: YELLOW
SL AMB POCT KETONES,UA: NORMAL
SL AMB POCT NITRITE,UA: NORMAL
SL AMB POCT PH,UA: 5
SL AMB POCT SPECIFIC GRAVITY,UA: 1.03
SL AMB POCT URINE PROTEIN: 30
SL AMB POCT UROBILINOGEN: 0.2
SODIUM SERPL-SCNC: 138 MMOL/L (ref 135–147)
SP GR UR STRIP.AUTO: 1.02 (ref 1–1.03)
UROBILINOGEN UR STRIP-ACNC: <2 MG/DL
WBC # BLD AUTO: 4.69 THOUSAND/UL (ref 4.31–10.16)
WBC #/AREA URNS AUTO: NORMAL /HPF

## 2024-11-13 PROCEDURE — 80053 COMPREHEN METABOLIC PANEL: CPT | Performed by: EMERGENCY MEDICINE

## 2024-11-13 PROCEDURE — 81001 URINALYSIS AUTO W/SCOPE: CPT | Performed by: PHYSICIAN ASSISTANT

## 2024-11-13 PROCEDURE — 74176 CT ABD & PELVIS W/O CONTRAST: CPT

## 2024-11-13 PROCEDURE — 85730 THROMBOPLASTIN TIME PARTIAL: CPT | Performed by: PHYSICIAN ASSISTANT

## 2024-11-13 PROCEDURE — 81025 URINE PREGNANCY TEST: CPT | Performed by: PHYSICIAN ASSISTANT

## 2024-11-13 PROCEDURE — 99284 EMERGENCY DEPT VISIT MOD MDM: CPT

## 2024-11-13 PROCEDURE — 36415 COLL VENOUS BLD VENIPUNCTURE: CPT

## 2024-11-13 PROCEDURE — 99213 OFFICE O/P EST LOW 20 MIN: CPT

## 2024-11-13 PROCEDURE — 83605 ASSAY OF LACTIC ACID: CPT | Performed by: PHYSICIAN ASSISTANT

## 2024-11-13 PROCEDURE — 81002 URINALYSIS NONAUTO W/O SCOPE: CPT

## 2024-11-13 PROCEDURE — 99285 EMERGENCY DEPT VISIT HI MDM: CPT | Performed by: PHYSICIAN ASSISTANT

## 2024-11-13 PROCEDURE — 85610 PROTHROMBIN TIME: CPT | Performed by: PHYSICIAN ASSISTANT

## 2024-11-13 PROCEDURE — 96375 TX/PRO/DX INJ NEW DRUG ADDON: CPT

## 2024-11-13 PROCEDURE — 85025 COMPLETE CBC W/AUTO DIFF WBC: CPT | Performed by: EMERGENCY MEDICINE

## 2024-11-13 PROCEDURE — 99222 1ST HOSP IP/OBS MODERATE 55: CPT | Performed by: NURSE PRACTITIONER

## 2024-11-13 PROCEDURE — 96361 HYDRATE IV INFUSION ADD-ON: CPT

## 2024-11-13 PROCEDURE — 83690 ASSAY OF LIPASE: CPT | Performed by: EMERGENCY MEDICINE

## 2024-11-13 PROCEDURE — 96374 THER/PROPH/DIAG INJ IV PUSH: CPT

## 2024-11-13 RX ORDER — ONDANSETRON 2 MG/ML
4 INJECTION INTRAMUSCULAR; INTRAVENOUS ONCE
Status: COMPLETED | OUTPATIENT
Start: 2024-11-13 | End: 2024-11-13

## 2024-11-13 RX ORDER — HYDROMORPHONE HCL/PF 1 MG/ML
0.5 SYRINGE (ML) INJECTION ONCE
Refills: 0 | Status: COMPLETED | OUTPATIENT
Start: 2024-11-13 | End: 2024-11-13

## 2024-11-13 RX ORDER — SODIUM CHLORIDE 9 MG/ML
125 INJECTION, SOLUTION INTRAVENOUS CONTINUOUS
Status: DISCONTINUED | OUTPATIENT
Start: 2024-11-13 | End: 2024-11-15 | Stop reason: HOSPADM

## 2024-11-13 RX ORDER — HYDROMORPHONE HCL IN WATER/PF 6 MG/30 ML
0.2 PATIENT CONTROLLED ANALGESIA SYRINGE INTRAVENOUS EVERY 6 HOURS PRN
Refills: 0 | Status: DISCONTINUED | OUTPATIENT
Start: 2024-11-13 | End: 2024-11-15 | Stop reason: HOSPADM

## 2024-11-13 RX ORDER — ONDANSETRON 2 MG/ML
4 INJECTION INTRAMUSCULAR; INTRAVENOUS ONCE AS NEEDED
Status: ACTIVE | OUTPATIENT
Start: 2024-11-13 | End: 2024-11-13

## 2024-11-13 RX ORDER — AMOXICILLIN 250 MG
2 CAPSULE ORAL
Status: DISCONTINUED | OUTPATIENT
Start: 2024-11-13 | End: 2024-11-15 | Stop reason: HOSPADM

## 2024-11-13 RX ORDER — ACETAMINOPHEN 10 MG/ML
1000 INJECTION, SOLUTION INTRAVENOUS ONCE
Status: COMPLETED | OUTPATIENT
Start: 2024-11-13 | End: 2024-11-13

## 2024-11-13 RX ORDER — OXYCODONE HYDROCHLORIDE 5 MG/1
5 TABLET ORAL EVERY 6 HOURS PRN
Refills: 0 | Status: DISCONTINUED | OUTPATIENT
Start: 2024-11-13 | End: 2024-11-15 | Stop reason: HOSPADM

## 2024-11-13 RX ORDER — POTASSIUM CHLORIDE 1500 MG/1
40 TABLET, EXTENDED RELEASE ORAL ONCE
Status: COMPLETED | OUTPATIENT
Start: 2024-11-13 | End: 2024-11-13

## 2024-11-13 RX ORDER — KETOROLAC TROMETHAMINE 30 MG/ML
15 INJECTION, SOLUTION INTRAMUSCULAR; INTRAVENOUS ONCE
Status: COMPLETED | OUTPATIENT
Start: 2024-11-13 | End: 2024-11-13

## 2024-11-13 RX ORDER — HYDROMORPHONE HCL/PF 1 MG/ML
0.5 SYRINGE (ML) INJECTION ONCE AS NEEDED
Status: DISCONTINUED | OUTPATIENT
Start: 2024-11-13 | End: 2024-11-13

## 2024-11-13 RX ORDER — HYDROMORPHONE HCL/PF 1 MG/ML
0.5 SYRINGE (ML) INJECTION ONCE
Status: COMPLETED | OUTPATIENT
Start: 2024-11-13 | End: 2024-11-13

## 2024-11-13 RX ADMIN — ACETAMINOPHEN 1000 MG: 10 INJECTION INTRAVENOUS at 19:54

## 2024-11-13 RX ADMIN — HYDROMORPHONE HYDROCHLORIDE 0.5 MG: 1 INJECTION, SOLUTION INTRAMUSCULAR; INTRAVENOUS; SUBCUTANEOUS at 19:55

## 2024-11-13 RX ADMIN — HYDROMORPHONE HYDROCHLORIDE 0.5 MG: 1 INJECTION, SOLUTION INTRAMUSCULAR; INTRAVENOUS; SUBCUTANEOUS at 21:25

## 2024-11-13 RX ADMIN — SODIUM CHLORIDE 1000 ML: 0.9 INJECTION, SOLUTION INTRAVENOUS at 19:54

## 2024-11-13 RX ADMIN — SODIUM CHLORIDE 125 ML/HR: 0.9 INJECTION, SOLUTION INTRAVENOUS at 21:25

## 2024-11-13 RX ADMIN — POTASSIUM CHLORIDE 40 MEQ: 1500 TABLET, EXTENDED RELEASE ORAL at 20:43

## 2024-11-13 RX ADMIN — ONDANSETRON 4 MG: 2 INJECTION INTRAMUSCULAR; INTRAVENOUS at 19:54

## 2024-11-13 RX ADMIN — KETOROLAC TROMETHAMINE 15 MG: 30 INJECTION, SOLUTION INTRAMUSCULAR; INTRAVENOUS at 19:54

## 2024-11-13 NOTE — PROGRESS NOTES
11/13/2024      Chief Complaint   Patient presents with    Nephrolithiasis         Assessment and Plan    49 y.o. female     Ureterolithiasis  -CT renal stone study (10/31/24) showing a 4 mm stone in the distal left ureter just proximal to the left UVJ. Mild left hydroureter without hydronephrosis.   -patient on alfuzosin, taking ibuprofen as needed.  -Urine dip is negative for leukocytes, nitrites, and blood.  -patient is currently asymptomatic other than urinary urgency.   -We will plan to obtain a renal US in 2 weeks. We discussed ED precautions if patient develops severe, uncontrollable pain, fevers, or vomiting.  -We also discussed ureteroscopy if patient is unable to pass the stone.  -Patient will return in 3 weeks for follow-up.    History of Present Illness  Lolis Huber is a 49 y.o. female here for follow-up of ureterolithiasis. She denies previous hx of stones.     Patient seen in ED on 10/31/24 with left lower abd discomfort. CT renal stone study showed a 4 mm stone in the distal left ureter just proximal to the UVJ. Mild left hydroureter without hydronephrosis.     Patient states she was having left lower abdominal discomfort. She states on Sunday this was severe. She is having no pain or discomfort today. Her urine dip is negative for leukocytes, nitrites, and blood. She denies chills/fever today. She states she is eating normally. She denies dysuria, flank pain, and gross hematuria. She states she is having urgency.     Review of Systems   Constitutional:  Negative for chills and fever.   HENT:  Negative for ear pain and sore throat.    Eyes:  Negative for pain and visual disturbance.   Respiratory:  Negative for cough and shortness of breath.    Cardiovascular:  Negative for chest pain and palpitations.   Gastrointestinal:  Negative for abdominal pain, nausea and vomiting.   Genitourinary:  Positive for urgency. Negative for difficulty urinating, dysuria, flank pain, frequency and hematuria.  "  Musculoskeletal:  Negative for arthralgias and back pain.   Skin:  Negative for color change and rash.   Neurological:  Negative for seizures and syncope.   All other systems reviewed and are negative.               Vitals  Vitals:    11/13/24 1030   BP: 110/72   BP Location: Left arm   Patient Position: Sitting   Cuff Size: Adult   Weight: 74.4 kg (164 lb)   Height: 5' 2\" (1.575 m)       Physical Exam  Constitutional:       General: She is not in acute distress.     Appearance: Normal appearance. She is not ill-appearing.   HENT:      Head: Normocephalic.   Eyes:      Extraocular Movements: Extraocular movements intact.      Pupils: Pupils are equal, round, and reactive to light.   Pulmonary:      Effort: Pulmonary effort is normal. No respiratory distress.   Musculoskeletal:         General: Normal range of motion.      Cervical back: Normal range of motion.   Neurological:      Mental Status: She is alert and oriented to person, place, and time.   Psychiatric:         Mood and Affect: Mood normal.         Behavior: Behavior normal.         Thought Content: Thought content normal.         Judgment: Judgment normal.           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    Last assessed 04/21/2023    Lateral epicondylitis of left elbow 01/07/2019    Last assessed 01/28/2019    New daily persistent headache 11/23/2020    Last assessed 12/15/2020    Pre-operative clearance 12/13/2023    Shoulder pain, right 01/18/2019     Social History     Socioeconomic History    Marital status: /Civil Union     Spouse name: None    Number of children: None    Years of education: None    Highest education level: None " "  Occupational History    None   Tobacco Use    Smoking status: Never    Smokeless tobacco: Never   Vaping Use    Vaping status: Never Used   Substance and Sexual Activity    Alcohol use: Yes     Comment: Social drinker    Drug use: No    Sexual activity: Yes     Partners: Male     Birth control/protection: Surgical   Other Topics Concern    None   Social History Narrative    · Occupation:   house wife         Per [employer]      Social Drivers of Health     Financial Resource Strain: Not on file   Food Insecurity: Not on file   Transportation Needs: Not on file   Physical Activity: Not on file   Stress: Not on file   Social Connections: Not on file   Intimate Partner Violence: Not on file   Housing Stability: Not on file     Social History     Tobacco Use   Smoking Status Never   Smokeless Tobacco Never     Family History   Problem Relation Age of Onset    Rheum arthritis Mother     Lupus Mother     Arthritis Mother         Rheumatoid    Cancer Father     Heart disease Father         bypass    Melanoma Father     Liver cancer Maternal Grandmother     No Known Problems Sister     No Known Problems Brother     No Known Problems Son     No Known Problems Daughter     No Known Problems Sister     No Known Problems Daughter     No Known Problems Daughter     No Known Problems Daughter        The following portions of the patient's history were reviewed and updated as appropriate: allergies, current medications, past medical history, past social history, past surgical history and problem list.    Results  Recent Results (from the past hour)   POCT urine dip    Collection Time: 11/13/24 10:34 AM   Result Value Ref Range    LEUKOCYTE ESTERASE,UA -     NITRITE,UA -     SL AMB POCT UROBILINOGEN 0.2     POCT URINE PROTEIN 30      PH,UA 5     BLOOD,UA -     SPECIFIC GRAVITY,UA 1.030     KETONES,UA trace     BILIRUBIN,UA -     GLUCOSE, UA -      COLOR,UA yellow     CLARITY,UA clear    ]  No results found for: \"PSA\"  Lab Results "   Component Value Date    CALCIUM 9.6 10/31/2024    K 3.7 10/31/2024    CO2 26 10/31/2024     10/31/2024    BUN 9 10/31/2024    CREATININE 0.74 10/31/2024     Lab Results   Component Value Date    WBC 4.82 10/31/2024    HGB 11.6 10/31/2024    HCT 37.0 10/31/2024    MCV 82 10/31/2024     10/31/2024       LORENA Sue

## 2024-11-14 ENCOUNTER — TELEPHONE (OUTPATIENT)
Dept: UROLOGY | Facility: CLINIC | Age: 49
End: 2024-11-14

## 2024-11-14 ENCOUNTER — ANESTHESIA EVENT (OUTPATIENT)
Dept: PERIOP | Facility: HOSPITAL | Age: 49
End: 2024-11-14
Payer: COMMERCIAL

## 2024-11-14 ENCOUNTER — ANESTHESIA (OUTPATIENT)
Dept: PERIOP | Facility: HOSPITAL | Age: 49
End: 2024-11-14
Payer: COMMERCIAL

## 2024-11-14 ENCOUNTER — APPOINTMENT (OUTPATIENT)
Dept: RADIOLOGY | Facility: HOSPITAL | Age: 49
End: 2024-11-14
Payer: COMMERCIAL

## 2024-11-14 PROBLEM — E87.6 HYPOKALEMIA: Status: RESOLVED | Noted: 2024-11-14 | Resolved: 2024-11-14

## 2024-11-14 PROBLEM — E87.6 HYPOKALEMIA: Status: ACTIVE | Noted: 2024-11-14

## 2024-11-14 PROCEDURE — 52352 CYSTOURETERO W/STONE REMOVE: CPT | Performed by: UROLOGY

## 2024-11-14 PROCEDURE — 74420 UROGRAPHY RTRGR +-KUB: CPT

## 2024-11-14 PROCEDURE — C1769 GUIDE WIRE: HCPCS | Performed by: UROLOGY

## 2024-11-14 PROCEDURE — C2625 STENT, NON-COR, TEM W/DEL SY: HCPCS | Performed by: UROLOGY

## 2024-11-14 PROCEDURE — C1758 CATHETER, URETERAL: HCPCS | Performed by: UROLOGY

## 2024-11-14 PROCEDURE — 99215 OFFICE O/P EST HI 40 MIN: CPT | Performed by: UROLOGY

## 2024-11-14 PROCEDURE — 52332 CYSTOSCOPY AND TREATMENT: CPT | Performed by: UROLOGY

## 2024-11-14 PROCEDURE — 82360 CALCULUS ASSAY QUANT: CPT | Performed by: UROLOGY

## 2024-11-14 DEVICE — STENT URETERAL 6FR 22CM INLAY OPTIMA W/NITINOL GDWR: Type: IMPLANTABLE DEVICE | Site: URETER | Status: FUNCTIONAL

## 2024-11-14 RX ORDER — OXYBUTYNIN CHLORIDE 5 MG/1
5 TABLET ORAL EVERY 6 HOURS PRN
Qty: 30 TABLET | Refills: 0 | Status: SHIPPED | OUTPATIENT
Start: 2024-11-14

## 2024-11-14 RX ORDER — SODIUM CHLORIDE, SODIUM LACTATE, POTASSIUM CHLORIDE, CALCIUM CHLORIDE 600; 310; 30; 20 MG/100ML; MG/100ML; MG/100ML; MG/100ML
INJECTION, SOLUTION INTRAVENOUS CONTINUOUS PRN
Status: DISCONTINUED | OUTPATIENT
Start: 2024-11-14 | End: 2024-11-14

## 2024-11-14 RX ORDER — HYDROMORPHONE HCL/PF 1 MG/ML
0.5 SYRINGE (ML) INJECTION ONCE
Status: DISCONTINUED | OUTPATIENT
Start: 2024-11-14 | End: 2024-11-14

## 2024-11-14 RX ORDER — SODIUM CHLORIDE 9 MG/ML
INJECTION, SOLUTION INTRAVENOUS AS NEEDED
Status: DISCONTINUED | OUTPATIENT
Start: 2024-11-14 | End: 2024-11-14 | Stop reason: HOSPADM

## 2024-11-14 RX ORDER — NAPROXEN 500 MG/1
500 TABLET ORAL 2 TIMES DAILY WITH MEALS
Qty: 10 TABLET | Refills: 0 | Status: SHIPPED | OUTPATIENT
Start: 2024-11-14 | End: 2024-11-19

## 2024-11-14 RX ORDER — PHENAZOPYRIDINE HYDROCHLORIDE 100 MG/1
200 TABLET, FILM COATED ORAL
Status: COMPLETED | OUTPATIENT
Start: 2024-11-14 | End: 2024-11-15

## 2024-11-14 RX ORDER — DEXAMETHASONE SODIUM PHOSPHATE 10 MG/ML
INJECTION, SOLUTION INTRAMUSCULAR; INTRAVENOUS AS NEEDED
Status: DISCONTINUED | OUTPATIENT
Start: 2024-11-14 | End: 2024-11-14

## 2024-11-14 RX ORDER — PHENAZOPYRIDINE HYDROCHLORIDE 200 MG/1
200 TABLET, FILM COATED ORAL 3 TIMES DAILY PRN
Qty: 10 TABLET | Refills: 0 | Status: SHIPPED | OUTPATIENT
Start: 2024-11-14 | End: 2024-11-17

## 2024-11-14 RX ORDER — MAGNESIUM HYDROXIDE 1200 MG/15ML
LIQUID ORAL AS NEEDED
Status: DISCONTINUED | OUTPATIENT
Start: 2024-11-14 | End: 2024-11-14 | Stop reason: HOSPADM

## 2024-11-14 RX ORDER — CEFAZOLIN SODIUM 1 G/50ML
1000 SOLUTION INTRAVENOUS
Status: COMPLETED | OUTPATIENT
Start: 2024-11-14 | End: 2024-11-14

## 2024-11-14 RX ORDER — DOCUSATE SODIUM 100 MG/1
100 CAPSULE, LIQUID FILLED ORAL 2 TIMES DAILY
Qty: 30 CAPSULE | Refills: 0 | Status: SHIPPED | OUTPATIENT
Start: 2024-11-14 | End: 2024-11-29

## 2024-11-14 RX ORDER — CEPHALEXIN 500 MG/1
500 CAPSULE ORAL EVERY 6 HOURS SCHEDULED
Qty: 3 CAPSULE | Refills: 0 | Status: SHIPPED | OUTPATIENT
Start: 2024-11-14 | End: 2024-11-15

## 2024-11-14 RX ORDER — FENTANYL CITRATE/PF 50 MCG/ML
50 SYRINGE (ML) INJECTION
Status: DISCONTINUED | OUTPATIENT
Start: 2024-11-14 | End: 2024-11-14 | Stop reason: HOSPADM

## 2024-11-14 RX ORDER — FENTANYL CITRATE 50 UG/ML
INJECTION, SOLUTION INTRAMUSCULAR; INTRAVENOUS AS NEEDED
Status: DISCONTINUED | OUTPATIENT
Start: 2024-11-14 | End: 2024-11-14

## 2024-11-14 RX ORDER — OXYCODONE HYDROCHLORIDE 5 MG/1
5 TABLET ORAL EVERY 4 HOURS PRN
Qty: 5 TABLET | Refills: 0 | Status: SHIPPED | OUTPATIENT
Start: 2024-11-14 | End: 2024-11-19

## 2024-11-14 RX ORDER — SODIUM CHLORIDE, SODIUM LACTATE, POTASSIUM CHLORIDE, CALCIUM CHLORIDE 600; 310; 30; 20 MG/100ML; MG/100ML; MG/100ML; MG/100ML
20 INJECTION, SOLUTION INTRAVENOUS CONTINUOUS
Status: DISCONTINUED | OUTPATIENT
Start: 2024-11-14 | End: 2024-11-14

## 2024-11-14 RX ORDER — ONDANSETRON 2 MG/ML
4 INJECTION INTRAMUSCULAR; INTRAVENOUS ONCE AS NEEDED
Status: DISCONTINUED | OUTPATIENT
Start: 2024-11-14 | End: 2024-11-14 | Stop reason: HOSPADM

## 2024-11-14 RX ORDER — ACETAMINOPHEN 325 MG/1
650 TABLET ORAL EVERY 8 HOURS PRN
Status: DISCONTINUED | OUTPATIENT
Start: 2024-11-14 | End: 2024-11-15 | Stop reason: HOSPADM

## 2024-11-14 RX ORDER — MIDAZOLAM HYDROCHLORIDE 2 MG/2ML
INJECTION, SOLUTION INTRAMUSCULAR; INTRAVENOUS AS NEEDED
Status: DISCONTINUED | OUTPATIENT
Start: 2024-11-14 | End: 2024-11-14

## 2024-11-14 RX ORDER — PROPOFOL 10 MG/ML
INJECTION, EMULSION INTRAVENOUS AS NEEDED
Status: DISCONTINUED | OUTPATIENT
Start: 2024-11-14 | End: 2024-11-14

## 2024-11-14 RX ORDER — ONDANSETRON 2 MG/ML
INJECTION INTRAMUSCULAR; INTRAVENOUS AS NEEDED
Status: DISCONTINUED | OUTPATIENT
Start: 2024-11-14 | End: 2024-11-14

## 2024-11-14 RX ORDER — SUCCINYLCHOLINE/SOD CL,ISO/PF 100 MG/5ML
SYRINGE (ML) INTRAVENOUS AS NEEDED
Status: DISCONTINUED | OUTPATIENT
Start: 2024-11-14 | End: 2024-11-14

## 2024-11-14 RX ORDER — LIDOCAINE HYDROCHLORIDE 20 MG/ML
INJECTION, SOLUTION EPIDURAL; INFILTRATION; INTRACAUDAL; PERINEURAL AS NEEDED
Status: DISCONTINUED | OUTPATIENT
Start: 2024-11-14 | End: 2024-11-14

## 2024-11-14 RX ORDER — ACETAMINOPHEN 325 MG/1
650 TABLET ORAL EVERY 4 HOURS PRN
Start: 2024-11-14

## 2024-11-14 RX ADMIN — PROPOFOL 150 MG: 10 INJECTION, EMULSION INTRAVENOUS at 14:30

## 2024-11-14 RX ADMIN — PHENYLEPHRINE HYDROCHLORIDE 50 MCG: 50 INJECTION INTRAVENOUS at 14:35

## 2024-11-14 RX ADMIN — Medication 80 MG: at 14:30

## 2024-11-14 RX ADMIN — HYDROMORPHONE HYDROCHLORIDE 0.2 MG: 0.2 INJECTION, SOLUTION INTRAMUSCULAR; INTRAVENOUS; SUBCUTANEOUS at 20:13

## 2024-11-14 RX ADMIN — SODIUM CHLORIDE 1000 ML: 0.9 INJECTION, SOLUTION INTRAVENOUS at 07:39

## 2024-11-14 RX ADMIN — PHENAZOPYRIDINE 200 MG: 100 TABLET ORAL at 17:56

## 2024-11-14 RX ADMIN — FENTANYL CITRATE 50 MCG: 50 INJECTION INTRAMUSCULAR; INTRAVENOUS at 14:30

## 2024-11-14 RX ADMIN — MIDAZOLAM 2 MG: 1 INJECTION INTRAMUSCULAR; INTRAVENOUS at 14:22

## 2024-11-14 RX ADMIN — HYDROMORPHONE HYDROCHLORIDE 0.2 MG: 0.2 INJECTION, SOLUTION INTRAMUSCULAR; INTRAVENOUS; SUBCUTANEOUS at 03:41

## 2024-11-14 RX ADMIN — CEFAZOLIN SODIUM 1000 MG: 1 SOLUTION INTRAVENOUS at 14:25

## 2024-11-14 RX ADMIN — DEXAMETHASONE SODIUM PHOSPHATE 10 MG: 10 INJECTION, SOLUTION INTRAMUSCULAR; INTRAVENOUS at 14:34

## 2024-11-14 RX ADMIN — SODIUM CHLORIDE, SODIUM LACTATE, POTASSIUM CHLORIDE, AND CALCIUM CHLORIDE: .6; .31; .03; .02 INJECTION, SOLUTION INTRAVENOUS at 14:25

## 2024-11-14 RX ADMIN — FENTANYL CITRATE 50 MCG: 50 INJECTION INTRAMUSCULAR; INTRAVENOUS at 14:37

## 2024-11-14 RX ADMIN — ONDANSETRON 4 MG: 2 INJECTION, SOLUTION INTRAMUSCULAR; INTRAVENOUS at 14:35

## 2024-11-14 RX ADMIN — LIDOCAINE HYDROCHLORIDE 80 MG: 20 INJECTION, SOLUTION EPIDURAL; INFILTRATION; INTRACAUDAL at 14:30

## 2024-11-14 RX ADMIN — OXYCODONE HYDROCHLORIDE 5 MG: 5 TABLET ORAL at 23:00

## 2024-11-14 RX ADMIN — OXYCODONE HYDROCHLORIDE 5 MG: 5 TABLET ORAL at 15:56

## 2024-11-14 NOTE — DISCHARGE SUMMARY
Discharge Summary - Hospitalist   Name: Lolis Huber 49 y.o. female I MRN: [id number]  Unit/Bed#: -01 I Date of Admission: 11/13/2024   Date of Service: 11/15/2024 I Hospital Day: 0     Assessment & Plan  Nephrolithiasis  Seen in ED 10/31 and found to have 4 mm UVJ stone with hydronephrosis.  Seen by urology in office today.  Repeat CT:  persistent distal left ureteral stone and mildly increased hydronephrosis.  UA negative for infection.  Creatinine stable.  11/14/2024: Urological procedure: Left ureteroscopy with basket extraction of stone with stent placement   No postop complication.  Medically stable for discharge from urologic standpoint.  Obesity  On zepbound 5 mg weekly on Mondays  Hypokalemia (Resolved: 11/14/2024)  Treated in ED       Medical Problems       Resolved Problems  Date Reviewed: 11/13/2024          Resolved    Hypokalemia 11/14/2024     Resolved by  Erwin Covarrubias MD        Discharging Physician / Practitioner: Laney Soliz MD  PCP: Marsha Green MD  Admission Date:   Admission Orders (From admission, onward)       Ordered        11/13/24 2053  Place in Observation  Once                          Discharge Date: 11/15/24    Consultations During Hospital Stay:  Urology    Procedures Performed:   1) Cystoscopy  2) Left retrograde pyelography with fluoroscopic interpretation  3) Left ureteroscopy with basket extraction of stone  4) Left ureteral stent placement    Significant Findings / Test Results:   FL retrograde pyelogram   Final Result by Raji Dickinson MD (11/15 0900)      Fluoroscopic guidance provided for left retrograde pyelogram.      Please see separate procedure report for additional details.         Workstation performed: [id number]         CT renal stone study abdomen pelvis without contrast   ED Interpretation by Obey Lala PA-C (11/13 2028)   Details      Reading Physician Reading Date Result Priority  Lucie Wilcox MD  [phone]    11/13/2024     Narrative &  Impression  CT ABDOMEN AND PELVIS WITHOUT IV CONTRAST - LOW DOSE RENAL STONE     INDICATION:   left flank pain with recent dx of left ureteral stone.     COMPARISON: 10/31/2024     TECHNIQUE:  Low dose thin section CT examination of the abdomen and pelvis was performed without intravenous or oral contrast according to a protocol specifically designed to evaluate for urinary tract calculus.  Axial, sagittal, and coronal 2D   reformatted images were created from the source data and submitted for interpretation.   Evaluation for pathology in the abdomen and pelvis that is unrelated to urinary tract calculi is limited.     Radiation dose length product (DLP) for this visit:  177 mGy-cm .  This examination, like all CT scans performed in the [facility], was performed utilizing techniques to minimize radiation dose exposure, including the use of iterati   ve   reconstruction and automated exposure control.     URINARY TRACT FINDINGS:     RIGHT KIDNEY AND URETER:  No urinary tract calculi.  No hydronephrosis or hydroureter. Within normal limits.     LEFT KIDNEY AND URETER:  Persistent and mildly increased ureteral pelviectasis. 0.4 cm left pelvic stone appears to be in the distal left ureter, stable position immediately proximal to the UVJ.  No perinephric fluid.     URINARY BLADDER:  Within normal limits.  Not distended.     OTHER:     LOWER CHEST:  No clinically significant abnormality identified in the visualized lower chest.     LIVER/BILIARY TREE:  Within normal limits.     GALLBLADDER: Cholecystectomy     SPLEEN:  Within normal limits.     PANCREAS:  Within normal limits.     ADRENAL GLANDS:  Within normal limits.     STOMACH AND BOWEL:  Within normal limits.     APPENDIX:  Within normal limits.     ABDOMINOPELVIC CAVITY:  No abnormal air, fluid or enlarged lymph nodes.     VESSELS:  Limit evaluation without IV contrast. Normal ao   rtic caliber.     PELVIS:     REPRODUCTIVE ORGANS:  Within normal  limits for age.     ABDOMINAL WALL/INGUINAL REGIONS: Unchanged, small, fat-containing umbilical hernia.     BONES: Within normal limits.        IMPRESSION:     Persistent distal left ureteral stone and mildly increased hydronephrosis.        Workstation performed: [id number]             Final Result by Lucie Wilcox MD (11/13 2009)      Persistent distal left ureteral stone and mildly increased hydronephrosis.         Workstation performed: [id number]               Incidental Findings:   None    Test Results Pending at Discharge (will require follow up):   Stone analysis     Outpatient Tests Requested:  None    Complications:  None    Reason for Admission: Left UVJ stone    Hospital Course:   Lolis Huber is a 49 y.o. female patient who originally presented to the hospital on 11/13/2024 due to worsening left flank pain that did not improve with oxycodone.  Patient with no history of left UVJ stone 4 mm on 10/31 imaging.  Case was discussed with urology in the ED the patient was admitted for planned cystoscopy.  Patient underwent cystoscopy and extraction of the stone with stent placement per urology on 11/14.  No postop complications.  Patient is medically cleared by urology.  She is medically stable for discharge today and will follow-up outpatient with urology for stent removal.    Please see above list of diagnoses and related plan for additional information.     Condition at Discharge: good    Discharge Day Visit / Exam:   Subjective: Overnight, patient endorsed mild bladder spasms post-op and was started on oxybutynin by night team.  Saw and examined patient at bedside.  Patient states that she continues to have mild bladder spasms. Patient denies fever, chills, or flank pain.  Vitals: Blood Pressure: 106/58 (11/15/24 0753)  Pulse: 65 (11/15/24 0753)  Temperature: 97.7 °F (36.5 °C) (11/15/24 0753)  Temp Source: Oral (11/15/24 0753)  Respirations: 18 (11/15/24 0753)  SpO2: 97 % (11/15/24 0753)  Physical  Exam  Constitutional:       General: She is not in acute distress.  HENT:      Head: Normocephalic.   Eyes:      Extraocular Movements: Extraocular movements intact.   Cardiovascular:      Rate and Rhythm: Normal rate and regular rhythm.      Heart sounds: No murmur heard.     No friction rub. No gallop.   Pulmonary:      Effort: Pulmonary effort is normal.      Breath sounds: Normal breath sounds. No wheezing, rhonchi or rales.   Abdominal:      General: Abdomen is flat. Bowel sounds are normal.      Palpations: Abdomen is soft.      Tenderness: There is no abdominal tenderness. There is no left CVA tenderness, guarding or rebound.   Genitourinary:     Comments: Stent string in place  Neurological:      Mental Status: She is alert and oriented to person, place, and time.   Psychiatric:         Mood and Affect: Mood normal.         Behavior: Behavior normal.          Discussion with Family: Updated  () at bedside.    Discharge instructions/Information to patient and family:   See after visit summary for information provided to patient and family.      Provisions for Follow-Up Care:  See after visit summary for information related to follow-up care and any pertinent home health orders.      Mobility at time of Discharge:   Basic Mobility Inpatient Raw Score: 24  JH-HLM Goal: 8: Walk 250 feet or more  JH-HLM Achieved: 8: Walk 250 feet ot more  HLM Goal achieved. Continue to encourage appropriate mobility.     Disposition:   Home    Planned Readmission: No    Discharge Medications:  See after visit summary for reconciled discharge medications provided to patient and/or family.      Administrative Statements   Discharge Statement:  I have spent a total time of 30 minutes in caring for this patient on the day of the visit/encounter. >30 minutes of time was spent on: Patient and family education, Impressions, Counseling / Coordination of care, Documenting in the medical record, Reviewing / ordering  tests, medicine, procedures  , and Communicating with other healthcare professionals .    **Please Note: This note may have been constructed using a voice recognition system**

## 2024-11-14 NOTE — OP NOTE
Operative Note     PATIENT:  Lolis Huber (MRN [id number])    DATE OF PROCEDURE:   11/14/2024    PRE-OP DIAGNOSIS:   1) Left ureteral calculus    POST-OP DIAGNOSIS:   1) Left ureteral calculus    PROCEDURES PERFORMED:  1) Cystoscopy  2) Left retrograde pyelography with fluoroscopic interpretation  3) Left ureteroscopy with basket extraction of stone  4) Left ureteral stent placement    SURGEON:  James Yoo MD    NOTE:  There were no qualified teaching residents to assist with this case    ANESTHESIA: General     COMPLICATIONS:   None    ANTIBIOTICS:  Ancef    INTRAOPERATIVE THROMBOEMBOLISM PROPHYLAXIS:  Pneumatic compression stockings     FINDINGS:  Successful basket extraction of solitary small left distal ureteral calculus.  Postoperative stent left in place with an external string.    INDICATIONS FOR PROCEDURE:  Lolis Huber is an 49 y.o. old female with Left ureteral nephrolithiasis.  After discussing the options, the patient elected to undergo ureteroscopy and ureteral stent placement.  We discussed the procedure in detail, the alternatives, and the risks, and they signed informed consent to proceed.    PROCEDURE IN DETAIL:   The patient was identified and brought to the OR.  Antibiotic prophylaxis and DVT prophylaxis were administered.  They were placed in the comfortable dorsal lithotomy position with care to pad all pressure points.  They were prepped and draped in the usual sterile fashion using hibiclens.  A surgical time out was performed with all in the room in agreement with the correct patient, procedure, indications, and laterality.  A 21-Yakut rigid cystoscope was used to enter the bladder.  The bladder was inspected in its entirety and there were no lesions noted.  The ureteral orifices were identified in their normal orthotopic positions.     The Left ureteral orifice was identified and a 5 Fr open ended catheter was placed into the ureteral orifice  .   A retrograde pyelogram was  performed with the findings as described above.  A Sensor wire was advanced up to the kidney under fluoroscopic guidance.  Leaving this safety wire in place, the bladder was drained.       A   7.5 [place] semi-rigid ureteroscope was advanced up the ureter under vision . The stone was encountered in the distal ureter.  The stone was not noted to be impacted.      Based upon the size, morphology, and location of the stone I felt that was amenable to direct basket extraction without the need for holmium laser lithotripsy.  This is accomplished using a 1.9 Nigerian Nitinol tip stone basket.  The stone was removed under vision intact and in an atraumatic fashion.  I then reintroduced the endoscope confirmed that the patient was completely stone free and there is no injury to the ureter.              The ureteroscope was backed down the ureter under vision and there were no residual fragments and the ureter was noted to be intact with no injury and no edema where the stone was located.   A JJ stent was then passed up the wire  under fluoroscopic guidance into the kidney with a good curl noted in the kidney and in the bladder.  An externalized tethering string was left in place and secured to the skin. The bladder was drained.      The patient was placed back supine, awakened from general anesthesia and brought to recovery room in stable condition.      ESTIMATED BLOOD LOSS:  Minimal      SPECIMENS:   No specimens collected during this procedure.     IMPLANTS:   Implant Name Type Inv. Item Serial No.  Lot No. LRB No. Used Action   STENT URETERAL 6FR 22CM INLAY OPTIMA W/NITINOL GDWR - [id number]  STENT URETERAL 6FR 22CM INLAY OPTIMA W/NITINOL GDWR  [employer] [id number] Left 1 Implanted        COMPLICATIONS: None    DISPOSITION: PACU    PLAN:  Patient requested an office visit to remove her stent.  I will request this on Monday.  Medically stable for discharge home as early as later today pending her  clinical course.  I communicated with the internal medicine team and provided discharge instructions and prescriptions

## 2024-11-14 NOTE — TELEPHONE ENCOUNTER
Patient is status post ureteroscopy.    Has a stent on a string and the patient requested nurse visit for removal.  Please schedule this on Monday.  Thank you

## 2024-11-14 NOTE — PLAN OF CARE
Problem: INFECTION - ADULT  Goal: Absence or prevention of progression during hospitalization  Description: INTERVENTIONS:  - Assess and monitor for signs and symptoms of infection  - Monitor lab/diagnostic results  - Monitor all insertion sites, i.e. indwelling lines, tubes, and drains  - Monitor endotracheal if appropriate and nasal secretions for changes in amount and color  - [place] appropriate cooling/warming therapies per order  - Administer medications as ordered  - Instruct and encourage patient and family to use good hand hygiene technique  - Identify and instruct in appropriate isolation precautions for identified infection/condition  Outcome: Progressing     Problem: SAFETY ADULT  Goal: Patient will remain free of falls  Description: INTERVENTIONS:  - Educate patient/family on patient safety including physical limitations  - Instruct patient to call for assistance with activity   - Consult OT/PT to assist with strengthening/mobility   - Keep Call bell within reach  - Keep bed low and locked with side rails adjusted as appropriate  - Keep care items and personal belongings within reach  - Initiate and maintain comfort rounds  - Make Fall Risk Sign visible to staff  - Apply yellow socks and bracelet for high fall risk patients  - Consider moving patient to room near nurses station  Outcome: Progressing

## 2024-11-14 NOTE — CONSULTS
Consultation - Urology   Name: Lolis Huber 49 y.o. female I MRN: [id number]  Unit/Bed#: -01 I Date of Admission: 11/13/2024   Date of Service: 11/14/2024 I Hospital Day: 0   Inpatient consult to Urology  Consult performed by: Sharon Vail PA-C  Consult ordered by: Obey Lala PA-C        Physician Requesting Evaluation: Cheyenne Galdamez MD   Reason for Evaluation / Principal Problem: nephrolithiasis    Assessment & Plan  Nephrolithiasis   ED 10/31 and found to have 4 mm L UVJ stone with hydronephrosis.  Seen by urology in office yesterday. Repeat CT:  persistent distal left ureteral stone and mildly increased hydronephrosis.   -VSS, afebrile  -UA negative  -Cr stable  -No leukocytosis  -Reports chills overnight. No fever or diaphoresis.   -Pain overnight. This AM feeling well, continued pain with urination. Denies any known stone passage however, not straining urine.  Discussed her symptoms, she has had two Cts past two week and Ct from last night stable position of stone. She is still having urinary symptoms sharp pain, decreased urination sensation, will defer any repeat CT before OR   -Discussed with patient cystoscopy, L ureteroscopy, holmium laser lithotripsy, basket stone extraction, retrograde pyelogram, insertion of L ureteral stent.  Discussed risk associated with procedure including risk with anesthesia, bleeding, infection, damage to kidneys, ureter, bladder, inability to treat stone, ureteral stricture, need for delayed ureteroscopy for any signs of infection.  Discussed stent colic including frequency, urgency, hematuria, flank pain.  -consent signed and placed in preop holding.       Subjective:   HPI: Lolis is a 49-year-old female past med history obesity, presented to the ED with worsening left flank pain.  Patient has a known left ureteral stone from ED visit on 10/31, showing a 4 mm stone in the distal left ureter just proximal to the left UVJ.  She is seen in the office yesterday  discussed medical expulsive therapy.  Patient reports persistence of symptoms, not relieved by oxycodone prompted repeat ED visit.  She underwent a repeat CT scan yesterday which showed persistent distal left ureteral stone and mildly increased hydronephrosis.  UA was negative for infection, no leukocytosis.  Vital signs stable, afebrile.  Patient is admitted to medicine service for further management.  Urology was consulted for possible surgical intervention.     Overnight denies stone passage. Not straining urine, however she is still having abnormal urinary symptoms indicative of persistent stone. Pain, feelings of decreased urination. She reports chills overnight. No documented fever or diaphoresis.     Review of Systems   Constitutional:  Negative for chills and fever.   Respiratory:  Negative for cough and shortness of breath.    Cardiovascular:  Negative for chest pain and palpitations.   Gastrointestinal:  Positive for abdominal pain. Negative for vomiting.   Genitourinary:  Negative for dysuria and hematuria.   Musculoskeletal:  Negative for arthralgias and back pain.   Skin:  Negative for color change and rash.   Neurological:  Negative for seizures and syncope.   All other systems reviewed and are negative.      Objective:    Vitals: Blood pressure (!) 89/56, pulse 62, temperature 97.9 °F (36.6 °C), temperature source Oral, resp. rate 16, last menstrual period 02/15/2023, SpO2 96%.,There is no height or weight on file to calculate BMI.    Physical Exam  Vitals reviewed.   Constitutional:       General: She is not in acute distress.     Appearance: Normal appearance. She is normal weight. She is not ill-appearing, toxic-appearing or diaphoretic.   HENT:      Head: Normocephalic and atraumatic.      Right Ear: External ear normal.      Left Ear: External ear normal.      Nose: Nose normal.      Mouth/Throat:      Mouth: Mucous membranes are moist.   Eyes:      General: No scleral icterus.      Conjunctiva/sclera: Conjunctivae normal.   Cardiovascular:      Rate and Rhythm: Normal rate and regular rhythm.      Pulses: Normal pulses.      Heart sounds: Normal heart sounds.   Pulmonary:      Effort: Pulmonary effort is normal.      Breath sounds: Normal breath sounds.   Abdominal:      General: There is no distension.      Tenderness: There is no abdominal tenderness. There is no right CVA tenderness, left CVA tenderness or guarding.   Musculoskeletal:         General: Normal range of motion.      Cervical back: Normal range of motion.   Skin:     General: Skin is warm.      Findings: No rash.   Neurological:      General: No focal deficit present.      Mental Status: She is alert and oriented to person, place, and time. Mental status is at baseline.   Psychiatric:         Mood and Affect: Mood normal.         Behavior: Behavior normal.         Thought Content: Thought content normal.         Judgment: Judgment normal.         Imaging:   CT ABDOMEN AND PELVIS WITHOUT IV CONTRAST - LOW DOSE RENAL STONE     INDICATION:   left flank pain with recent dx of left ureteral stone.     COMPARISON: 10/31/2024     TECHNIQUE:  Low dose thin section CT examination of the abdomen and pelvis was performed without intravenous or oral contrast according to a protocol specifically designed to evaluate for urinary tract calculus.  Axial, sagittal, and coronal 2D   reformatted images were created from the source data and submitted for interpretation.   Evaluation for pathology in the abdomen and pelvis that is unrelated to urinary tract calculi is limited.     Radiation dose length product (DLP) for this visit:  177 mGy-cm .  This examination, like all CT scans performed in the [facility], was performed utilizing techniques to minimize radiation dose exposure, including the use of iterative   reconstruction and automated exposure control.     URINARY TRACT FINDINGS:     RIGHT KIDNEY AND URETER:  No urinary  tract calculi.  No hydronephrosis or hydroureter. Within normal limits.     LEFT KIDNEY AND URETER:  Persistent and mildly increased ureteral pelviectasis. 0.4 cm left pelvic stone appears to be in the distal left ureter, stable position immediately proximal to the UVJ.  No perinephric fluid.     URINARY BLADDER:  Within normal limits.  Not distended.     OTHER:     LOWER CHEST:  No clinically significant abnormality identified in the visualized lower chest.     LIVER/BILIARY TREE:  Within normal limits.     GALLBLADDER: Cholecystectomy     SPLEEN:  Within normal limits.     PANCREAS:  Within normal limits.     ADRENAL GLANDS:  Within normal limits.     STOMACH AND BOWEL:  Within normal limits.     APPENDIX:  Within normal limits.     ABDOMINOPELVIC CAVITY:  No abnormal air, fluid or enlarged lymph nodes.     VESSELS:  Limit evaluation without IV contrast. Normal aortic caliber.     PELVIS:     REPRODUCTIVE ORGANS:  Within normal limits for age.     ABDOMINAL WALL/INGUINAL REGIONS: Unchanged, small, fat-containing umbilical hernia.     BONES: Within normal limits.        IMPRESSION:     Persistent distal left ureteral stone and mildly increased hydronephrosis.        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    Last assessed 04/21/2023    Lateral epicondylitis of left elbow 01/07/2019    Last assessed 01/28/2019    Nephrolithiasis 11/13/2024    New daily persistent  headache 2020    Last assessed 12/15/2020    Pre-operative clearance 2023    Shoulder pain, right 2019     Social History     Socioeconomic History    Marital status: /Civil Union     Spouse name: None    Number of children: None    Years of education: None    Highest education level: None   Occupational History    None   Tobacco Use    Smoking status: Never    Smokeless tobacco: Never   Vaping Use    Vaping status: Never Used   Substance and Sexual Activity    Alcohol use: Yes     Comment: Social drinker    Drug use: No    Sexual activity: Yes     Partners: Male     Birth control/protection: Surgical   Other Topics Concern    None   Social History Narrative    · Occupation:   house wife         Per [employer]      Social Drivers of Health     Financial Resource Strain: Not on file   Food Insecurity: Not on file   Transportation Needs: Not on file   Physical Activity: Not on file   Stress: Not on file   Social Connections: Not on file   Intimate Partner Violence: Not on file   Housing Stability: Not on file     Past Surgical History:   Procedure Laterality Date     SECTION      x 1    CHOLECYSTECTOMY  2016    COLONOSCOPY  2019    to be done in 5 years ()    COLONOSCOPY  2016    GALLBLADDER SURGERY      KNEE SURGERY      MAMMO (HISTORICAL) Bilateral 2022    NORMAL    TUBAL LIGATION       Family History   Problem Relation Age of Onset    Rheum arthritis Mother     Lupus Mother     Arthritis Mother         Rheumatoid    Cancer Father     Heart disease Father         bypass    Melanoma Father     Liver cancer Maternal Grandmother     No Known Problems Sister     No Known Problems Brother     No Known Problems Son     No Known Problems Daughter     No Known Problems Sister     No Known Problems Daughter     No Known Problems Daughter     No Known Problems Daughter        Sharon Vail PA-C  Date: 2024 Time: 8:29 AM

## 2024-11-14 NOTE — ED PROVIDER NOTES
Time reflects when diagnosis was documented in both MDM as applicable and the Disposition within this note       Time User Action Codes Description Comment    2024  8:52 PM Obey Lala Add [N20.1] Left ureteral stone     2024  8:52 PM Obey Lala Add [R31.9] Hematuria           ED Disposition       ED Disposition   Admit    Condition   Stable    Date/Time     8:53 PM    Comment   Case was discussed with slim and the patient's admission status was agreed to be Admission Status: observation status to the service of Dr. Tran, internal medicine.               Assessment & Plan       Medical Decision Making  Patient is a 49-year-old female with a history of  section, cholecystectomy, tubal ligation, and ureteral stone that presents to the emergent department with sharp shooting persistent worsening left flank pain with radiation to left lower quadrant of abdomen for approximately 2 weeks.   Patient hemodynamically stable and afebrile  No sirs  Left flank pain secondary to left ureteral stone 4 mm on CT scan  No KAIDEN, no leukocytosis, trace occult blood, no nitrites, no bacteria. Patient recently was diagnosed with left ureteral stone on 10/31/2024; with left ureteral stone, discharged with oxycodone. Patient saw urology earlier today, on uroxatral 10mg, allergic to Bactrim. Came into the ED for further evaluation given extreme left flank pain symptomatology. Patient still in pain after multiple rounds of IV pain control delivered.  Discussion with internal medicine team and urology and all agreed to place patient on inpatient observational status under the care of Dr. Tran, internal medicine    Patient demonstrates verbal understanding of all clinical laboratory and imaging findings, admission instructions, and verbally agrees with current treatment plan with teach back    *Due to voice recognition software, sound alike and misspelled words may be contained in the  documentation*    Amount and/or Complexity of Data Reviewed  Labs: ordered. Decision-making details documented in ED Course.  Radiology: ordered and independent interpretation performed. Decision-making details documented in ED Course.    Risk  Prescription drug management.  Decision regarding hospitalization.        ED Course as of 24 [place] text to slim and urology.       Medications   HYDROmorphone (DILAUDID) injection 0.5 mg (0 mg Intravenous Hold 24)   HYDROmorphone (DILAUDID) injection 0.5 mg (has no administration in time range)   sodium chloride 0.9 % infusion (has no administration in time range)   ondansetron (ZOFRAN) injection 4 mg (has no administration in time range)   sodium chloride 0.9 % bolus 1,000 mL (0 mL Intravenous Stopped 24)   ondansetron (ZOFRAN) injection 4 mg (4 mg Intravenous Given 24)   ketorolac (TORADOL) injection 15 mg (15 mg Intravenous Given 24)   HYDROmorphone (DILAUDID) injection 0.5 mg (0.5 mg Intravenous Given 24)   acetaminophen (Ofirmev) injection 1,000 mg (0 mg Intravenous Stopped 24)   potassium chloride (Klor-Con M20) CR tablet 40 mEq (40 mEq Oral Given 24)       ED Risk Strat Scores                                               History of Present Illness       Chief Complaint   Patient presents with    Flank Pain     Reports continued left sided flank pain, diagnosed with kidney stones 2 weeks ago, saw urology this am, pain is now severe. +chills  Last took oxycodone and tylenol at 5:45.   Reports frequency with oliguria     Patient is a 49-year-old female with a history of  section, cholecystectomy, tubal ligation, and ureteral stone that presents to the emergent department with sharp shooting persistent worsening left flank pain with radiation to left lower quadrant of abdomen for approximately 2 weeks.  Patient has associated symptomatology of chills  "and difficulty with urination beginning today.  Patient with recent evaluation at the emergency department on 10/31/2024 for left lower abdominal discomfort, CT readings at that time regenerating 4 mm calculus in the distal left ureter with hCG negative, urinalysis without leukocytes esterase or nitrates, no evidence of UTI, trace blood presents, abdominal pain improved with analgesia, discharged to home care with return to the ED precautions in place.  Patient states that \"I do not think I passed my kidney stone.\"  Patient affirms mild comfort from oxycodone with provocative factors of pressure to left flank.  Patient denies noneffective treatment.  Patient denies fevers, nausea, vomiting, diarrhea, constipation and urinary symptoms.  Patient denies recent fall and recent trauma.  Patient denies sick contacts and recent travel.  Patient denies chest pain and shortness of breath.      Past Medical History:   Diagnosis Date    Abnormal Pap smear of cervix     Alcohol intoxication (HCC) 2021    ED visit    Allergies     bactrim, IV contrast dye    Double vision 2020    Last assessed 2021    Enlarged pituitary gland (HCC)     Excessive crying of adult 2020    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 2020    Last assessed 2023    Lateral epicondylitis of left elbow 2019    Last assessed 2019    New daily persistent headache 2020    Last assessed 12/15/2020    Pre-operative clearance 2023    Shoulder pain, right 2019      Past Surgical History:   Procedure Laterality Date     SECTION      x 1    CHOLECYSTECTOMY  2016    COLONOSCOPY  2019    to be done in 5 years ()    COLONOSCOPY  2016    GALLBLADDER SURGERY      KNEE SURGERY      MAMMO (HISTORICAL) Bilateral 2022    NORMAL    TUBAL LIGATION        Family History   Problem Relation Age of Onset    Rheum arthritis Mother     Lupus Mother     Arthritis Mother         Rheumatoid    " Cancer Father     Heart disease Father         bypass    Melanoma Father     Liver cancer Maternal Grandmother     No Known Problems Sister     No Known Problems Brother     No Known Problems Son     No Known Problems Daughter     No Known Problems Sister     No Known Problems Daughter     No Known Problems Daughter     No Known Problems Daughter       Social History     Tobacco Use    Smoking status: Never    Smokeless tobacco: Never   Vaping Use    Vaping status: Never Used   Substance Use Topics    Alcohol use: Yes     Comment: Social drinker    Drug use: No      E-Cigarette/Vaping    E-Cigarette Use Never User       E-Cigarette/Vaping Substances    Nicotine No     THC No     CBD No     Flavoring No     Other No     Unknown No       I have reviewed and agree with the history as documented.       History provided by:  Patient   used: No    Flank Pain  Associated symptoms: nausea    Associated symptoms: no chest pain, no chills, no constipation, no cough, no diarrhea, no dysuria, no fever, no shortness of breath, no sore throat and no vomiting        Review of Systems   Constitutional:  Negative for activity change, appetite change, chills and fever.   HENT:  Negative for congestion, postnasal drip, rhinorrhea, sinus pressure, sinus pain, sore throat and tinnitus.    Eyes:  Negative for photophobia and visual disturbance.   Respiratory:  Negative for cough, chest tightness and shortness of breath.    Cardiovascular:  Negative for chest pain and palpitations.   Gastrointestinal:  Positive for nausea. Negative for abdominal pain, constipation, diarrhea and vomiting.   Genitourinary:  Positive for flank pain. Negative for difficulty urinating, dysuria, frequency and urgency.   Musculoskeletal:  Negative for back pain, gait problem, neck pain and neck stiffness.   Skin:  Negative for pallor and rash.   Allergic/Immunologic: Negative for environmental allergies and food allergies.   Neurological:   Negative for dizziness, weakness, numbness and headaches.   Psychiatric/Behavioral:  Negative for confusion.    All other systems reviewed and are negative.          Objective       ED Triage Vitals   Temperature Pulse Blood Pressure Respirations SpO2 Patient Position - Orthostatic VS   11/13/24 1915 11/13/24 1915 11/13/24 1915 11/13/24 1915 11/13/24 1915 11/13/24 1915   98.6 °F (37 °C) 90 168/79 18 99 % Sitting      Temp Source Heart Rate Source BP Location FiO2 (%) Pain Score    11/13/24 1915 11/13/24 1915 11/13/24 1915 -- 11/13/24 1955    Oral Monitor Right arm  10 - Worst Possible Pain      Vitals      Date and Time Temp Pulse SpO2 Resp BP Pain Score FACES Pain Rating User   11/13/24 2043 -- -- -- -- -- 6 -- KL   11/13/24 2000 -- 79 100 % -- 117/81 -- --    11/13/24 1955 -- -- -- -- -- 10 - Worst Possible Pain --    11/13/24 1915 98.6 °F (37 °C) 90 99 % 18 168/79 -- -- NP            Physical Exam  Vitals and nursing note reviewed.   Constitutional:       General: She is awake.      Appearance: Normal appearance. She is well-developed and normal weight. She is not ill-appearing, toxic-appearing or diaphoretic.      Comments: /79 (BP Location: Right arm)   Pulse 90   Temp 98.6 °F (37 °C) (Oral)   Resp 18   LMP 02/15/2023   SpO2 99%      HENT:      Head: Normocephalic and atraumatic.      Jaw: There is normal jaw occlusion.      Right Ear: Hearing, tympanic membrane and external ear normal. No decreased hearing noted. No drainage, swelling or tenderness. No mastoid tenderness.      Left Ear: Hearing, tympanic membrane and external ear normal. No decreased hearing noted. No drainage, swelling or tenderness. No mastoid tenderness.      Nose: Nose normal.      Mouth/Throat:      Lips: Pink.      Mouth: Mucous membranes are moist.      Pharynx: Oropharynx is clear. Uvula midline.   Eyes:      General: Lids are normal. Vision grossly intact. Gaze aligned appropriately.         Right eye: No discharge.          Left eye: No discharge.      Extraocular Movements: Extraocular movements intact.      Conjunctiva/sclera: Conjunctivae normal.      Pupils: Pupils are equal, round, and reactive to light.   Neck:      Vascular: No JVD.      Trachea: Trachea and phonation normal. No tracheal tenderness or tracheal deviation.   Cardiovascular:      Rate and Rhythm: Normal rate and regular rhythm.      Pulses: Normal pulses.           Radial pulses are 2+ on the right side and 2+ on the left side.        Posterior tibial pulses are 2+ on the right side and 2+ on the left side.      Heart sounds: Normal heart sounds.   Pulmonary:      Effort: Pulmonary effort is normal.      Breath sounds: Normal breath sounds and air entry. No stridor. No decreased breath sounds, wheezing, rhonchi or rales.   Chest:      Chest wall: No tenderness.   Abdominal:      General: Abdomen is flat. Bowel sounds are normal. There is no distension.      Palpations: Abdomen is soft. Abdomen is not rigid.      Tenderness: There is abdominal tenderness in the left lower quadrant. There is left CVA tenderness. There is no right CVA tenderness, guarding or rebound.   Musculoskeletal:         General: Normal range of motion.      Cervical back: Full passive range of motion without pain, normal range of motion and neck supple. No rigidity. No spinous process tenderness or muscular tenderness. Normal range of motion.   Feet:      Right foot:      Toenail Condition: Right toenails are normal.      Left foot:      Toenail Condition: Left toenails are normal.   Lymphadenopathy:      Head:      Right side of head: No submental, submandibular, tonsillar, preauricular, posterior auricular or occipital adenopathy.      Left side of head: No submental, submandibular, tonsillar, preauricular, posterior auricular or occipital adenopathy.      Cervical: No cervical adenopathy.      Right cervical: No superficial, deep or posterior cervical adenopathy.     Left cervical: No  superficial, deep or posterior cervical adenopathy.   Skin:     General: Skin is warm.      Capillary Refill: Capillary refill takes less than 2 seconds.      Findings: No rash.   Neurological:      General: No focal deficit present.      Mental Status: She is alert and oriented to person, place, and time. Mental status is at baseline.      GCS: GCS eye subscore is 4. GCS verbal subscore is 5. GCS motor subscore is 6.      Sensory: No sensory deficit.      Deep Tendon Reflexes: Reflexes are normal and symmetric.      Reflex Scores:       Patellar reflexes are 2+ on the right side and 2+ on the left side.  Psychiatric:         Attention and Perception: Attention normal.         Mood and Affect: Mood normal.         Speech: Speech normal.         Behavior: Behavior normal. Behavior is cooperative.         Thought Content: Thought content normal.         Judgment: Judgment normal.         Results Reviewed       Procedure Component Value Units Date/Time    [place] draw [[id number]] Collected: 11/13/24 1919    Lab Status: Final result Specimen: Blood from Arm, Left Updated: 11/13/24 2101    Narrative:      The following orders were created for panel order [place] draw.  Procedure                               Abnormality         Status                     ---------                               -----------         ------                     Light Blue Top on hold[[id number]]                           Final result               Green / Black tube on hold[[id number]]                       Final result                 Please view results for these tests on the individual orders.    Lactic acid, plasma (w/reflex if result > 2.0) [[id number]]  (Normal) Collected: 11/13/24 1953    Lab Status: Final result Specimen: Blood from Arm, Left Updated: 11/13/24 2023     LACTIC ACID 2.0 mmol/L     Narrative:      Result may be elevated if tourniquet was used during collection.    Urine Microscopic [[id number]]  (Normal) Collected: 11/13/24  1940    Lab Status: Final result Specimen: Urine, Clean Catch Updated: 11/13/24 1957     RBC, UA 1-2 /hpf      WBC, UA 1-2 /hpf      Epithelial Cells Occasional /hpf      Bacteria, UA None Seen /hpf     UA w Reflex to Microscopic w Reflex to Culture [[id number]]  (Abnormal) Collected: 11/13/24 1940    Lab Status: Final result Specimen: Urine, Clean Catch Updated: 11/13/24 1956     Color, UA Light Yellow     Clarity, UA Clear     Specific Gravity, UA 1.019     pH, UA 5.5     Leukocytes, UA Negative     Nitrite, UA Negative     Protein, UA Negative mg/dl      Glucose, UA Negative mg/dl      Ketones, UA Negative mg/dl      Urobilinogen, UA <2.0 mg/dl      Bilirubin, UA Negative     Occult Blood, UA Trace    POCT pregnancy, urine [[id number]]  (Normal) Collected: 11/13/24 1956    Lab Status: Final result Updated: 11/13/24 1956     EXT Preg Test, Ur Negative     Control Valid    Comprehensive metabolic panel [[id number]]  (Abnormal) Collected: 11/13/24 1919    Lab Status: Final result Specimen: Blood from Arm, Left Updated: 11/13/24 1954     Sodium 138 mmol/L      Potassium 3.2 mmol/L      Chloride 103 mmol/L      CO2 24 mmol/L      ANION GAP 11 mmol/L      BUN 12 mg/dL      Creatinine 0.81 mg/dL      Glucose 94 mg/dL      Calcium 9.6 mg/dL      AST 14 U/L      ALT 10 U/L      Alkaline Phosphatase 69 U/L      Total Protein 8.3 g/dL      Albumin 4.4 g/dL      Total Bilirubin 0.47 mg/dL      eGFR 84 ml/min/1.73sq m     Narrative:      National Kid[employer] Disease Foundation guidelines for Chronic Kidney Disease (CKD):     Stage 1 with normal or high GFR (GFR > 90 mL/min/1.73 square meters)    Stage 2 Mild CKD (GFR = 60-89 mL/min/1.73 square meters)    Stage 3A Moderate CKD (GFR = 45-59 mL/min/1.73 square meters)    Stage 3B Moderate CKD (GFR = 30-44 mL/min/1.73 square meters)    Stage 4 Severe CKD (GFR = 15-29 mL/min/1.73 square meters)    Stage 5 End Stage CKD (GFR <15 mL/min/1.73 square meters)  Note: GFR calculation is accurate  only with a steady state creatinine    Lipase [[id number]]  (Normal) Collected: 11/13/24 1919    Lab Status: Final result Specimen: Blood from Arm, Left Updated: 11/13/24 1954     Lipase 18 u/L     Protime-INR [[id number]] Collected: 11/13/24 1919    Lab Status: In process Specimen: Blood from Arm, Left Updated: 11/13/24 1935    APTT [[id number]] Collected: 11/13/24 1919    Lab Status: In process Specimen: Blood from Arm, Left Updated: 11/13/24 1935    CBC and differential [[id number]]  (Abnormal) Collected: 11/13/24 1919    Lab Status: Final result Specimen: Blood from Arm, Left Updated: 11/13/24 1928     WBC 4.69 Thousand/uL      RBC 4.37 Million/uL      Hemoglobin 11.3 g/dL      Hematocrit 36.5 %      MCV 84 fL      MCH 25.9 pg      MCHC 31.0 g/dL      RDW 14.8 %      MPV 9.0 fL      Platelets 262 Thousands/uL      nRBC 0 /100 WBCs      Segmented % 44 %      Immature Grans % 0 %      Lymphocytes % 48 %      Monocytes % 6 %      Eosinophils Relative 1 %      Basophils Relative 1 %      Absolute Neutrophils 2.07 Thousands/µL      Absolute Immature Grans 0.01 Thousand/uL      Absolute Lymphocytes 2.28 Thousands/µL      Absolute Monocytes 0.26 Thousand/µL      Eosinophils Absolute 0.04 Thousand/µL      Basophils Absolute 0.03 Thousands/µL             CT renal stone study abdomen pelvis without contrast   ED Interpretation by Obey Lala PA-C (11/13 2028)   Details      Reading Physician Reading Date Result Priority  Lucie Wilcox MD  [phone]    11/13/2024     Narrative & Impression  CT ABDOMEN AND PELVIS WITHOUT IV CONTRAST - LOW DOSE RENAL STONE     INDICATION:   left flank pain with recent dx of left ureteral stone.     COMPARISON: 10/31/2024     TECHNIQUE:  Low dose thin section CT examination of the abdomen and pelvis was performed without intravenous or oral contrast according to a protocol specifically designed to evaluate for urinary tract calculus.  Axial, sagittal, and coronal 2D   reformatted images  were created from the source data and submitted for interpretation.   Evaluation for pathology in the abdomen and pelvis that is unrelated to urinary tract calculi is limited.     Radiation dose length product (DLP) for this visit:  177 mGy-cm .  This examination, like all CT scans performed in the [facility], was performed utilizing techniques to minimize radiation dose exposure, including the use of iterati   ve   reconstruction and automated exposure control.     URINARY TRACT FINDINGS:     RIGHT KIDNEY AND URETER:  No urinary tract calculi.  No hydronephrosis or hydroureter. Within normal limits.     LEFT KIDNEY AND URETER:  Persistent and mildly increased ureteral pelviectasis. 0.4 cm left pelvic stone appears to be in the distal left ureter, stable position immediately proximal to the UVJ.  No perinephric fluid.     URINARY BLADDER:  Within normal limits.  Not distended.     OTHER:     LOWER CHEST:  No clinically significant abnormality identified in the visualized lower chest.     LIVER/BILIARY TREE:  Within normal limits.     GALLBLADDER: Cholecystectomy     SPLEEN:  Within normal limits.     PANCREAS:  Within normal limits.     ADRENAL GLANDS:  Within normal limits.     STOMACH AND BOWEL:  Within normal limits.     APPENDIX:  Within normal limits.     ABDOMINOPELVIC CAVITY:  No abnormal air, fluid or enlarged lymph nodes.     VESSELS:  Limit evaluation without IV contrast. Normal ao   rtic caliber.     PELVIS:     REPRODUCTIVE ORGANS:  Within normal limits for age.     ABDOMINAL WALL/INGUINAL REGIONS: Unchanged, small, fat-containing umbilical hernia.     BONES: Within normal limits.        IMPRESSION:     Persistent distal left ureteral stone and mildly increased hydronephrosis.        Workstation performed: [id number]             Final Interpretation by Lucie Wilcox MD (11/13 2009)      Persistent distal left ureteral stone and mildly increased hydronephrosis.         Workstation  performed: [id number]             Procedures    ED Medication and Procedure Management   Prior to Admission Medications   Prescriptions Last Dose Informant Patient Reported? Taking?   Zepbound 10 MG/0.5ML auto-injector  Self No No   Sig: Inject 0.5 mL (10 mg total) under the skin once a week for 28 days Do not start before December 26, 2024.   Zepbound 12.5 MG/0.5ML auto-injector  Self No No   Sig: Inject 0.5 mL (12.5 mg total) under the skin once a week for 28 days Do not start before January 23, 2025.   Zepbound 15 MG/0.5ML auto-injector  Self No No   Sig: Inject 0.5 mL (15 mg total) under the skin once a week Do not start before February 20, 2025.   Zepbound 2.5 MG/0.5ML auto-injector  Self No No   Sig: INJECT 2.5MG SUBCUTANEOUSLY (UNDER THE SKIN) ONCE WEEKLY   Zepbound 5 MG/0.5ML auto-injector  Self No No   Sig: INJECT 5MG SUBCUTANEOUSLY (UNDER THE SKIN) ONCE WEEKLY   Zepbound 7.5 MG/0.5ML auto-injector  Self No No   Sig: Inject 0.5 mL (7.5 mg total) under the skin once a week for 28 days Do not start before November 28, 2024.   alfuzosin (UROXATRAL) 10 mg 24 hr tablet  Self No No   Sig: Take 1 tablet (10 mg total) by mouth daily   ibuprofen (MOTRIN) 600 mg tablet  Self No No   Sig: Take 1 tablet (600 mg total) by mouth every 8 (eight) hours as needed for moderate pain   ondansetron (ZOFRAN) 4 mg tablet  Self No No   Sig: Take 1 tablet (4 mg total) by mouth 2 (two) times a day as needed for nausea or vomiting      Facility-Administered Medications: None     Patient's Medications   Discharge Prescriptions    No medications on file     No discharge procedures on file.  ED SEPSIS DOCUMENTATION   Time reflects when diagnosis was documented in both MDM as applicable and the Disposition within this note       Time User Action Codes Description Comment    11/13/2024  8:52 PM Obey Lala [N20.1] Left ureteral stone     11/13/2024  8:52 PM Obey Lala [R31.9] Hematuria                  Obey Lala  VERÓNICA  11/13/24 [id number]

## 2024-11-14 NOTE — ANESTHESIA POSTPROCEDURE EVALUATION
Post-Op Assessment Note    CV Status:  Stable    Pain management: adequate       Mental Status:  Arousable   Hydration Status:  Euvolemic   PONV Controlled:  Controlled   Airway Patency:  Patent  Airway: intubated     Post Op Vitals Reviewed: Yes    No anethesia notable event occurred.    Staff: CRNA           Last Filed PACU Vitals:  Vitals Value Taken Time   Temp 97.6    Pulse 89    /58    Resp 18    SpO2 100        Modified Jm:  No data recorded

## 2024-11-14 NOTE — DISCHARGE INSTR - AVS FIRST PAGE
Dear Lolis Huber,     It was our pleasure to care for you here at [facility].  It is our hope that we were always able to exceed the expected standards for your care during your stay.  You were hospitalized due to left ureteral stone.  You were cared for on the [facility] first floor by Laney Soliz MD under the service of Cheyenne Galdamez MD with the [facility] who covers for your primary care physician (PCP), Marsha Green MD, while you were hospitalized.  If you have any questions or concerns related to this hospitalization, you may contact us at .  For follow up as well as any medication refills, we recommend that you follow up with your primary care physician.  A registered nurse will reach out to you by phone within a few days after your discharge to answer any additional questions that you may have after going home.  However, at this time we provide for you here, the most important instructions / recommendations at discharge:     Notable Medication Adjustments -   Please START Tylenol 650 mg every 4 hours as needed for mild pain  Please START Oxycodone 5 mg every 4 hours as needed for severe pain  Please START naproxen 500 mg twice daily with meals for up to 5 days for pain  Please START Pyridium 200 mg 3 times daily as needed for pain with urination  Please START oxybutynin 5 mg every 6 hours as needed for bladder spasms  Please START Colace 100 mg twice daily for 15 days  Starting the morning of your stent removal, please take Keflex 500 mg every 6 hours for 3 doses   Continue the rest of your home medications as prescribed  Testing Required after Discharge -   Follow-up with urology in 2 months for x-ray and ultrasound  Important follow up information -   Please follow-up with [facility] urology [place] for your stent removal on 11/18/2024  Please follow-up with your primary care provider within 1 week  Other Instructions -    If you get begin to experience severe abdominal or flank plain, fever, chills, please return to the hospital.  Please refer to urology's discharge instructions below.  Please review this entire after visit summary as additional general instructions including medication list, appointments, activity, diet, any pertinent wound care, and other additional recommendations from your care team that may be provided for you.      Sincerely,     MD Lolis Espinoza:    Your surgery went very well.   you underwent ureteroscopy with basket extraction of your ureteral stone.  The surgery was very successful and the stone was removed completely.    My office will call you to schedule a visit to remove your stent on Monday .    We will then see you back in 2 months with an x-ray and ultrasound.    Please take your medications as prescribed with caution for comfort.  Most importantly please drink 6-8 glasses of water per day    Please call with any questions or concerns.    aJmes Yoo MD,PhD  [facility]  [phone]            WHAT IS A STENT?  At the end of the procedure, your doctor may place a stent into your ureter. A stent is a thin, flexible piece of plastic that will hold open your ureter while the remaining small pieces of stone pass. This allows your kidney to drain easily and prevents you from having to “pass” these small stone pieces on your own, which could be painful. The stent is about 12 inches long and looks and feels like a thin piece of spaghetti.    AFTER THE PROCEDURE  After the procedure you may experience the following symptoms. All of these are normal and should resolve within 1 or 2 days after your stent is removed.  Urinary frequency (urinating more often than usual)  Urinary urgency (the sensation that you need to urinate right away)  Painful urination (this can be pain in your bladder or in your back when  you urinate)  Blood in your urine ( a stent  can irritate the lining of your bladder causing it to bleed)  Back/Flank pain, especially with urination  You will receive a prescription for narcotic pain medication after the procedure. You will also receive a prescription for tamsulosin which you will take once a day for 2 weeks to help relax your ureter and decrease stent discomfort. You will also need to purchase a stool softener (i.e. Colace) or mild laxative (i.e. Miralax) as the narcotic pain medication can make you constipated. This is important as constipation can exacerbate stent related symptoms.     STENT REMOVAL  In some cases, your doctor will leave strings attached to your stent. The strings will be taped to your skin after the procedure. The strings will allow you to remove the thin flexible stent while you are at home. Normally, the stent can be removed 3-5 days after your procedure; your physician will tell you the specific date after your procedure.     On the day you are supposed to remove your stent, do the following:  When you wake up in the morning, take 1-2 pain pills with food.  Start your antibiotic pill the morning of schedule stent removal if prescribed  One hour later sit on the toilet or in the bath tub.  Take a deep breath in and while exhaling, pull the string.   Dispose of the stent in the garbage.    Alternatively, you will come back for an office procedure to remove the stent by placing a small camera into your bladder to remove the stent.    During the next 4-8 hours after removing your stent, you may experience additional blood in your urine, pain with urination or back/side pain. You should take the pain medication you were prescribed to help you with the pain, as well as continue the Flomax. If the pain is severe, you are vomiting, and/or have a fever > 101.4 please call the clinic.

## 2024-11-14 NOTE — ASSESSMENT & PLAN NOTE
Seen in ED 10/31 and found to have 4 mm UVJ stone with hydronephrosis.  Seen by urology in office today.  Repeat CT:  persistent distal left ureteral stone and mildly increased hydronephrosis.  UA negative for infection.  Creatinine stable.  Case reviewed by ED with urology  Npo midnight  On alfuxosin as outpatient which is nonformulary.  Cannot take flomax.

## 2024-11-14 NOTE — DISCHARGE INSTRUCTIONS
Details    Reading Physician Reading Date Result Priority   Lucie Wilcox MD  [phone]     11/13/2024      Narrative & Impression  CT ABDOMEN AND PELVIS WITHOUT IV CONTRAST - LOW DOSE RENAL STONE     INDICATION:   left flank pain with recent dx of left ureteral stone.     COMPARISON: 10/31/2024     TECHNIQUE:  Low dose thin section CT examination of the abdomen and pelvis was performed without intravenous or oral contrast according to a protocol specifically designed to evaluate for urinary tract calculus.  Axial, sagittal, and coronal 2D   reformatted images were created from the source data and submitted for interpretation.   Evaluation for pathology in the abdomen and pelvis that is unrelated to urinary tract calculi is limited.     Radiation dose length product (DLP) for this visit:  177 mGy-cm .  This examination, like all CT scans performed in the [facility], was performed utilizing techniques to minimize radiation dose exposure, including the use of iterative   reconstruction and automated exposure control.     URINARY TRACT FINDINGS:     RIGHT KIDNEY AND URETER:  No urinary tract calculi.  No hydronephrosis or hydroureter. Within normal limits.     LEFT KIDNEY AND URETER:  Persistent and mildly increased ureteral pelviectasis. 0.4 cm left pelvic stone appears to be in the distal left ureter, stable position immediately proximal to the UVJ.  No perinephric fluid.     URINARY BLADDER:  Within normal limits.  Not distended.     OTHER:     LOWER CHEST:  No clinically significant abnormality identified in the visualized lower chest.     LIVER/BILIARY TREE:  Within normal limits.     GALLBLADDER: Cholecystectomy     SPLEEN:  Within normal limits.     PANCREAS:  Within normal limits.     ADRENAL GLANDS:  Within normal limits.     STOMACH AND BOWEL:  Within normal limits.     APPENDIX:  Within normal limits.     ABDOMINOPELVIC CAVITY:  No abnormal air, fluid or enlarged lymph nodes.      VESSELS:  Limit evaluation without IV contrast. Normal aortic caliber.     PELVIS:     REPRODUCTIVE ORGANS:  Within normal limits for age.     ABDOMINAL WALL/INGUINAL REGIONS: Unchanged, small, fat-containing umbilical hernia.     BONES: Within normal limits.        IMPRESSION:     Persistent distal left ureteral stone and mildly increased hydronephrosis.        Workstation performed: [id number]

## 2024-11-14 NOTE — ASSESSMENT & PLAN NOTE
Seen in ED 10/31 and found to have 4 mm UVJ stone with hydronephrosis.  Seen by urology in office today.  Repeat CT:  persistent distal left ureteral stone and mildly increased hydronephrosis.  UA negative for infection.  Creatinine stable.    Plan:  Case reviewed by ED with urology  Pt received left ureteroscopy with basket extraction of stone and left ureteral stent placement with urology 11/14  Continue pain regimen post-operation  On alfuxosin as outpatient which is nonformulary.  Cannot take flomax.

## 2024-11-14 NOTE — ASSESSMENT & PLAN NOTE
Seen in ED 10/31 and found to have 4 mm UVJ stone with hydronephrosis.  Seen by urology in office today.  Repeat CT:  persistent distal left ureteral stone and mildly increased hydronephrosis.  UA negative for infection.  Creatinine stable.  11/14/2024: Urological procedure: Left ureteroscopy with basket extraction of stone with stent placement   No postop complication.  Medically stable for discharge from urologic standpoint.

## 2024-11-14 NOTE — H&P
H&P - Hospitalist   Name: Lolis Huber 49 y.o. female I MRN: [id number]  Unit/Bed#: -01 I Date of Admission: 11/13/2024   Date of Service: 11/14/2024 I Hospital Day: 0     Assessment & Plan  Obesity  On zepbound 5 mg weekly on Mondays  Nephrolithiasis  Seen in ED 10/31 and found to have 4 mm UVJ stone with hydronephrosis.  Seen by urology in office today.  Repeat CT:  persistent distal left ureteral stone and mildly increased hydronephrosis.  UA negative for infection.  Creatinine stable.  Case reviewed by ED with urology  Npo midnight  On alfuxosin as outpatient which is nonformulary.  Cannot take flomax.  Hypokalemia  Treated in ED  BMP in AM      VTE Pharmacologic Prophylaxis: VTE Score: 1 Low Risk (Score 0-2) - Encourage Ambulation.  Code Status: No Order   Discussion with family: Updated  (significant other) at bedside.    Anticipated Length of Stay: Patient will be admitted on an observation basis with an anticipated length of stay of less than 2 midnights secondary to pain control, urology eval.    History of Present Illness   Chief Complaint: flank pain    Lolis Huber is a 49 y.o. female with a PMH of obesity,  who presents with left flank pain that did not improve with oxycodone.  Patient has known left ureteral stone noted on 10/31 imaging.  Repeat imaging shows persistent distal left ureteral stone and mildly increased hydronephrosis.   She is uroxatral as outpatient due to interaction with bactrim.      Review of Systems   Genitourinary:  Positive for flank pain and urgency.   All other systems reviewed and are negative.      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    Last assessed 2023    Lateral epicondylitis of left elbow 2019    Last assessed 2019    Nephrolithiasis 2024    New daily persistent headache 2020    Last assessed 12/15/2020    Pre-operative clearance 2023    Shoulder pain, right 2019     Past Surgical History:   Procedure Laterality Date     SECTION      x 1    CHOLECYSTECTOMY  2016    COLONOSCOPY  2019    to be done in 5 years ()    COLONOSCOPY  2016    GALLBLADDER SURGERY      KNEE SURGERY      MAMMO (HISTORICAL) Bilateral 2022    NORMAL    TUBAL LIGATION       Social History     Tobacco Use    Smoking status: Never    Smokeless tobacco: Never   Vaping Use    Vaping status: Never Used   Substance and Sexual Activity    Alcohol use: Yes     Comment: Social drinker    Drug use: No    Sexual activity: Yes     Partners: Male     Birth control/protection: Surgical     E-Cigarette/Vaping    E-Cigarette Use Never User      E-Cigarette/Vaping Substances    Nicotine No     THC No     CBD No     Flavoring No     Other No     Unknown No      Family History   Problem Relation Age of Onset    Rheum arthritis Mother     Lupus Mother     Arthritis Mother         Rheumatoid    Cancer Father     Heart disease Father         bypass    Melanoma Father     Liver cancer Maternal Grandmother     No Known Problems Sister     No Known Problems Brother     No Known Problems Son     No Known Problems Daughter     No Known Problems Sister     No Known Problems Daughter     No Known Problems Daughter     No Known Problems Daughter      Social History:  Marital Status: /Civil Union   Occupation:   Patient Pre-hospital Living Situation: Home  Patient Pre-hospital Level of Mobility: walks  Patient Pre-hospital Diet Restrictions:     Meds/Allergies   I have reviewed home medications with patient personally.  Prior to Admission medications    Medication Sig Start Date End Date Taking? Authorizing  Provider   alfuzosin (UROXATRAL) 10 mg 24 hr tablet Take 1 tablet (10 mg total) by mouth daily 11/11/24   Racquel Lynch PA-C   ibuprofen (MOTRIN) 600 mg tablet Take 1 tablet (600 mg total) by mouth every 8 (eight) hours as needed for moderate pain 10/31/24   Marisa Prather MD   ondansetron (ZOFRAN) 4 mg tablet Take 1 tablet (4 mg total) by mouth 2 (two) times a day as needed for nausea or vomiting 11/11/24   Marsha Green MD   Zepbound 10 MG/0.5ML auto-injector Inject 0.5 mL (10 mg total) under the skin once a week for 28 days Do not start before December 26, 2024. 12/26/24 1/23/25  Marsha Green MD   Zepbound 12.5 MG/0.5ML auto-injector Inject 0.5 mL (12.5 mg total) under the skin once a week for 28 days Do not start before January 23, 2025. 1/23/25 2/20/25  Marsha Green MD   Zepbound 15 MG/0.5ML auto-injector Inject 0.5 mL (15 mg total) under the skin once a week Do not start before February 20, 2025. 2/20/25   Marsha Green MD   Zepbound 2.5 MG/0.5ML auto-injector INJECT 2.5MG SUBCUTANEOUSLY (UNDER THE SKIN) ONCE WEEKLY 11/1/24   Marsha Green MD   Zepbound 5 MG/0.5ML auto-injector INJECT 5MG SUBCUTANEOUSLY (UNDER THE SKIN) ONCE WEEKLY 11/1/24   Marsha Green MD   Zepbound 7.5 MG/0.5ML auto-injector Inject 0.5 mL (7.5 mg total) under the skin once a week for 28 days Do not start before November 28, 2024. 11/28/24 12/26/24  Marsha Green MD     Allergies   Allergen Reactions    Bactrim [Sulfamethoxazole-Trimethoprim] Other (See Comments)     Suicidal    Contrast Dye [Iodinated Contrast Media] Rash       Objective :  Temp:  [97.7 °F (36.5 °C)-98.6 °F (37 °C)] 97.7 °F (36.5 °C)  HR:  [79-90] 80  BP: (105-168)/(55-81) 105/58  Resp:  [16-18] 16  SpO2:  [98 %-100 %] 98 %  O2 Device: None (Room air)    Physical Exam  Constitutional:       General: She is in acute distress.      Appearance: Normal appearance. She is not ill-appearing or toxic-appearing.   HENT:      Head: Normocephalic and  "atraumatic.      Right Ear: External ear normal.      Left Ear: External ear normal.      Nose: Nose normal.      Mouth/Throat:      Pharynx: Oropharynx is clear.   Eyes:      Extraocular Movements: Extraocular movements intact.      Conjunctiva/sclera: Conjunctivae normal.   Cardiovascular:      Rate and Rhythm: Normal rate and regular rhythm.      Pulses: Normal pulses.      Heart sounds: Normal heart sounds.   Pulmonary:      Effort: Pulmonary effort is normal.      Breath sounds: Normal breath sounds.   Abdominal:      General: Bowel sounds are normal. There is no distension.      Palpations: Abdomen is soft.      Tenderness: There is abdominal tenderness. There is left CVA tenderness. There is no right CVA tenderness or guarding.   Musculoskeletal:         General: Normal range of motion.      Cervical back: Normal range of motion.   Skin:     General: Skin is warm.      Capillary Refill: Capillary refill takes less than 2 seconds.   Neurological:      General: No focal deficit present.      Mental Status: She is alert and oriented to person, place, and time.   Psychiatric:         Mood and Affect: Mood normal.         Behavior: Behavior normal.          Lines/Drains:            Lab Results: I have reviewed the following results:  Results from last 7 days   Lab Units 11/13/24 1919   WBC Thousand/uL 4.69   HEMOGLOBIN g/dL 11.3*   HEMATOCRIT % 36.5   PLATELETS Thousands/uL 262   SEGS PCT % 44   LYMPHO PCT % 48*   MONO PCT % 6   EOS PCT % 1     Results from last 7 days   Lab Units 11/13/24 1919   SODIUM mmol/L 138   POTASSIUM mmol/L 3.2*   CHLORIDE mmol/L 103   CO2 mmol/L 24   BUN mg/dL 12   CREATININE mg/dL 0.81   ANION GAP mmol/L 11   CALCIUM mg/dL 9.6   ALBUMIN g/dL 4.4   TOTAL BILIRUBIN mg/dL 0.47   ALK PHOS U/L 69   ALT U/L 10   AST U/L 14   GLUCOSE RANDOM mg/dL 94     Results from last 7 days   Lab Units 11/13/24 1919   INR  0.99         No results found for: \"HGBA1C\"  Results from last 7 days   Lab Units " 11/13/24 1953   LACTIC ACID mmol/L 2.0       Imaging Results Review: I reviewed radiology reports from this admission including: CT abdomen/pelvis.  Other Study Results Review: No additional pertinent studies reviewed.    Administrative Statements   I have spent a total time of   minutes in caring for this patient on the day of the visit/encounter including Diagnostic results, Risks and benefits of tx options, Instructions for management, Patient and family education, Importance of tx compliance, Risk factor reductions, Impressions, Counseling / Coordination of care, Documenting in the medical record, Reviewing / ordering tests, medicine, procedures  , Obtaining or reviewing history  , and Communicating with other healthcare professionals .       ** Please Note: This note has been constructed using a voice recognition system. **

## 2024-11-14 NOTE — PROGRESS NOTES
Progress Note - Hospitalist   Name: Lolis Huber 49 y.o. female I MRN: [id number]  Unit/Bed#: -01 I Date of Admission: 11/13/2024   Date of Service: 11/14/2024 I Hospital Day: 0    Assessment & Plan  Nephrolithiasis  Seen in ED 10/31 and found to have 4 mm UVJ stone with hydronephrosis.  Seen by urology in office today.  Repeat CT:  persistent distal left ureteral stone and mildly increased hydronephrosis.  UA negative for infection.  Creatinine stable.    Plan:  Case reviewed by ED with urology  Pt received left ureteroscopy with basket extraction of stone and left ureteral stent placement with urology 11/14  Continue pain regimen post-operation  On alfuxosin as outpatient which is nonformulary.  Cannot take flomax.  Obesity  On zepbound 5 mg weekly on Mondays    VTE Pharmacologic Prophylaxis: VTE Score: 1 Low Risk (Score 0-2) - Encourage Ambulation.    Mobility:   Basic Mobility Inpatient Raw Score: 24  JH-HLM Goal: 8: Walk 250 feet or more  JH-HLM Achieved: 8: Walk 250 feet ot more  JH-HLM Goal achieved. Continue to encourage appropriate mobility.    Patient Centered Rounds: I performed bedside rounds with nursing staff today.   Discussions with Specialists or Other Care Team Provider: Urology    Education and Discussions with Family / Patient: Updated  () at bedside.    Current Length of Stay: 0 day(s)  Current Patient Status: Observation   Certification Statement: The patient will continue to require additional inpatient hospital stay due to not medically stable for discharge  Discharge Plan: Anticipate discharge tomorrow to home.    Code Status: Level 1 - Full Code    Subjective   No acute overnight events. Saw and examined pt at bedside this morning. Pt in no acute distress. Pt states her left flank pain is now 1/10 in intensity. She denies fever, abdominal pain, cough, or shortness of breath.    Objective :  Temp:  [97.7 °F (36.5 °C)-98.6 °F (37 °C)] 97.7 °F (36.5 °C)  HR:   [62-90] 88  BP: ()/(52-81) 140/64  Resp:  [16-19] 18  SpO2:  [96 %-100 %] 99 %  O2 Device: None (Room air)    There is no height or weight on file to calculate BMI.     Input and Output Summary (last 24 hours):     Intake/Output Summary (Last 24 hours) at 11/14/2024 1602  Last data filed at 11/14/2024 1503  Gross per 24 hour   Intake 1300 ml   Output 250 ml   Net 1050 ml       Physical Exam  Constitutional:       General: She is not in acute distress.  HENT:      Head: Normocephalic.   Eyes:      Extraocular Movements: Extraocular movements intact.   Cardiovascular:      Rate and Rhythm: Normal rate and regular rhythm.      Heart sounds: No murmur heard.     No friction rub. No gallop.   Pulmonary:      Effort: Pulmonary effort is normal.      Breath sounds: Normal breath sounds. No wheezing, rhonchi or rales.   Abdominal:      General: Abdomen is flat. Bowel sounds are normal.      Palpations: Abdomen is soft.      Tenderness: There is no abdominal tenderness. There is no left CVA tenderness, guarding or rebound.   Neurological:      Mental Status: She is alert and oriented to person, place, and time.   Psychiatric:         Mood and Affect: Mood normal.         Behavior: Behavior normal.           Lines/Drains:  Lines/Drains/Airways       Active Status       Name Placement date Placement time Site Days    Ureteral Internal Stent Left ureter 6 Fr. 11/14/24  1448  Left ureter  less than 1                            Lab Results: I have reviewed the following results:   Results from last 7 days   Lab Units 11/13/24  1919   WBC Thousand/uL 4.69   HEMOGLOBIN g/dL 11.3*   HEMATOCRIT % 36.5   PLATELETS Thousands/uL 262   SEGS PCT % 44   LYMPHO PCT % 48*   MONO PCT % 6   EOS PCT % 1     Results from last 7 days   Lab Units 11/13/24  1919   SODIUM mmol/L 138   POTASSIUM mmol/L 3.2*   CHLORIDE mmol/L 103   CO2 mmol/L 24   BUN mg/dL 12   CREATININE mg/dL 0.81   ANION GAP mmol/L 11   CALCIUM mg/dL 9.6   ALBUMIN g/dL 4.4    TOTAL BILIRUBIN mg/dL 0.47   ALK PHOS U/L 69   ALT U/L 10   AST U/L 14   GLUCOSE RANDOM mg/dL 94     Results from last 7 days   Lab Units 11/13/24  1919   INR  0.99             Results from last 7 days   Lab Units 11/13/24 1953   LACTIC ACID mmol/L 2.0       Recent Cultures (last 7 days):         Imaging Results Review: I reviewed radiology reports from this admission including: CT abdomen/pelvis.  CT renal stone study abdomen pelvis without contrast   ED Interpretation by Obey Lala PA-C (11/13 2028)   Details      Reading Physician Reading Date Result Priority  Lucie Wilcox MD  [phone]    11/13/2024     Narrative & Impression  CT ABDOMEN AND PELVIS WITHOUT IV CONTRAST - LOW DOSE RENAL STONE     INDICATION:   left flank pain with recent dx of left ureteral stone.     COMPARISON: 10/31/2024     TECHNIQUE:  Low dose thin section CT examination of the abdomen and pelvis was performed without intravenous or oral contrast according to a protocol specifically designed to evaluate for urinary tract calculus.  Axial, sagittal, and coronal 2D   reformatted images were created from the source data and submitted for interpretation.   Evaluation for pathology in the abdomen and pelvis that is unrelated to urinary tract calculi is limited.     Radiation dose length product (DLP) for this visit:  177 mGy-cm .  This examination, like all CT scans performed in the [facility], was performed utilizing techniques to minimize radiation dose exposure, including the use of iterati   ve   reconstruction and automated exposure control.     URINARY TRACT FINDINGS:     RIGHT KIDNEY AND URETER:  No urinary tract calculi.  No hydronephrosis or hydroureter. Within normal limits.     LEFT KIDNEY AND URETER:  Persistent and mildly increased ureteral pelviectasis. 0.4 cm left pelvic stone appears to be in the distal left ureter, stable position immediately proximal to the UVJ.  No perinephric fluid.     URINARY  BLADDER:  Within normal limits.  Not distended.     OTHER:     LOWER CHEST:  No clinically significant abnormality identified in the visualized lower chest.     LIVER/BILIARY TREE:  Within normal limits.     GALLBLADDER: Cholecystectomy     SPLEEN:  Within normal limits.     PANCREAS:  Within normal limits.     ADRENAL GLANDS:  Within normal limits.     STOMACH AND BOWEL:  Within normal limits.     APPENDIX:  Within normal limits.     ABDOMINOPELVIC CAVITY:  No abnormal air, fluid or enlarged lymph nodes.     VESSELS:  Limit evaluation without IV contrast. Normal ao   rtic caliber.     PELVIS:     REPRODUCTIVE ORGANS:  Within normal limits for age.     ABDOMINAL WALL/INGUINAL REGIONS: Unchanged, small, fat-containing umbilical hernia.     BONES: Within normal limits.        IMPRESSION:     Persistent distal left ureteral stone and mildly increased hydronephrosis.        Workstation performed: [id number]             Final Result by Lucie Wilcox MD (11/13 2009)      Persistent distal left ureteral stone and mildly increased hydronephrosis.         Workstation performed: [id number]         FL retrograde pyelogram    (Results Pending)      Other Study Results Review: No additional pertinent studies reviewed.    Last 24 Hours Medication List:     Current Facility-Administered Medications:     HYDROmorphone HCl (DILAUDID) injection 0.2 mg, Q6H PRN    oxyCODONE (ROXICODONE) IR tablet 5 mg, Q6H PRN    oxyCODONE (ROXICODONE) split tablet 2.5 mg, Q6H PRN    senna-docusate sodium (SENOKOT S) 8.6-50 mg per tablet 2 tablet, HS PRN    sodium chloride 0.9 % infusion, Continuous, Last Rate: 125 mL/hr (11/13/24 2125)    Administrative Statements   Today, Patient Was Seen By: Laney Soliz MD  I have spent a total time of 30 minutes in caring for this patient on the day of the visit/encounter including Diagnostic results, Risks and benefits of tx options, Instructions for management, Patient and family education, Importance of tx  compliance, Risk factor reductions, Impressions, Counseling / Coordination of care, Documenting in the medical record, Reviewing / ordering tests, medicine, procedures , Obtaining or reviewing history , and Communicating with other healthcare professionals .     **Please Note: This note may have been constructed using a voice recognition system.**

## 2024-11-14 NOTE — QUICK NOTE
I was contacted by the ER physician due to the patient presenting to the emergency room earlier this evening due to worsening left sided flank pain that was not controlled at home with OTC medications or oxycodone. She originally presented to the ER on 10/31/2024 with a similar presentation and was found to have a 4 mm left UVJ calculus with associated hydronephrosis.     She was seen and examined in the clinic this afternoon and instructed to continue with medical expulsive therapy with strict ER precautions.     She has no leukocytosis, kidney function remains at baseline, and vital signs are stable. Plan will be to keep NPO at midnight in case of surgical intervention tomorrow morning. Formal urology consultation to follow tomorrow on 11/14/2024.     /81 (BP Location: Right arm)   Pulse 79   Temp 98.6 °F (37 °C) (Oral)   Resp 18   LMP 02/15/2023   SpO2 100%     Lab Results   Component Value Date    WBC 4.69 11/13/2024    HGB 11.3 (L) 11/13/2024    HCT 36.5 11/13/2024    MCV 84 11/13/2024     11/13/2024     Lab Results   Component Value Date    SODIUM 138 11/13/2024    K 3.2 (L) 11/13/2024     11/13/2024    CO2 24 11/13/2024    BUN 12 11/13/2024    CREATININE 0.81 11/13/2024    GLUC 94 11/13/2024    CALCIUM 9.6 11/13/2024

## 2024-11-14 NOTE — ASSESSMENT & PLAN NOTE
ED 10/31 and found to have 4 mm L UVJ stone with hydronephrosis.  Seen by urology in office yesterday. Repeat CT:  persistent distal left ureteral stone and mildly increased hydronephrosis.   -VSS, afebrile  -UA negative  -Cr stable  -No leukocytosis  -Reports chills overnight. No fever or diaphoresis.   -Pain overnight. This AM feeling well, continued pain with urination. Denies any known stone passage however, not straining urine.  Discussed her symptoms, she has had two Cts past two week and Ct from last night stable position of stone. She is still having urinary symptoms sharp pain, decreased urination sensation, will defer any repeat CT before OR   -Discussed with patient cystoscopy, L ureteroscopy, holmium laser lithotripsy, basket stone extraction, retrograde pyelogram, insertion of L ureteral stent.  Discussed risk associated with procedure including risk with anesthesia, bleeding, infection, damage to kidneys, ureter, bladder, inability to treat stone, ureteral stricture, need for delayed ureteroscopy for any signs of infection.  Discussed stent colic including frequency, urgency, hematuria, flank pain.  -consent signed and placed in preop holding.

## 2024-11-14 NOTE — ANESTHESIA PREPROCEDURE EVALUATION
Procedure:  CYSTOSCOPY URETEROSCOPY WITH LITHOTRIPSY HOLMIUM LASER, RETROGRADE PYELOGRAM AND INSERTION STENT URETERAL (Left: Bladder)    Relevant Problems   CARDIO  Echo 1/2024:  ·  Left Ventricle: Left ventricular cavity size is normal. Wall thickness is normal. The left ventricular ejection fraction is 55%. Systolic function is normal. Wall motion is normal. Diastolic function is normal.  ·  Tricuspid Valve: There is mild regurgitation.    Holter 48 hr 1/2024:  Benign 48 holter tracing, majority sinus rhythm      /RENAL   (+) Nephrolithiasis      HEMATOLOGY   (+) Iron deficiency anemia      NEURO/PSYCH   (+) Chronic left shoulder pain     Meds:  Tirzepatide last taken on Monday night    NPO        Physical Exam    Airway    Mallampati score: II         Dental   No notable dental hx     Cardiovascular  Cardiovascular exam normal    Pulmonary  Pulmonary exam normal     Other Findings  post-pubertal.      Anesthesia Plan  ASA Score- 2     Anesthesia Type- general with ASA Monitors.         Additional Monitors:     Airway Plan: ETT.    Comment: Increased risk of aspiration given patient's use of tirzepatide this week. Discussed with patient appropriate holding duration for this medication.  Will plan for RSI.       Plan Factors-Exercise tolerance (METS): >4 METS.    Chart reviewed. EKG reviewed.   Patient summary reviewed.                  Induction- intravenous and rapid sequence induction.    Postoperative Plan- Plan for postoperative opioid use. Planned trial extubation    Perioperative Resuscitation Plan - Level 1 - Full Code.       Informed Consent- Anesthetic plan and risks discussed with patient.  I personally reviewed this patient with the CRNA. Discussed and agreed on the Anesthesia Plan with the CRNA..

## 2024-11-14 NOTE — UTILIZATION REVIEW
Initial Clinical Review    Admission: Date/Time/Statement:   Admission Orders (From admission, onward)       Ordered        11/13/24 2053  Place in Observation  Once                          Orders Placed This Encounter   Procedures    Place in Observation     Standing Status:   Standing     Number of Occurrences:   1     Level of Care:   Med Surg [16]     ED Arrival Information       Expected   -    Arrival   11/13/2024 18:57    Acuity   Emergent              Means of arrival   Walk-In    Escorted by   Family Member    Service   Hospitalist    Admission type   Emergency              Arrival complaint   Severe flank pain             Chief Complaint   Patient presents with    Flank Pain     Reports continued left sided flank pain, diagnosed with kidney stones 2 weeks ago, saw urology this am, pain is now severe. +chills  Last took oxycodone and tylenol at 5:45.   Reports frequency with oliguria         Initial Presentation: 49 y.o. female to ED as walk in as for evaluation & treatment as Observation admission due to Nephrolithiasis 4 mm UVJ  w increasing Hydronephrosis  PMH ureteral stone that presents to the emergent department with sharp shooting persistent worsening left flank pain with radiation to left lower quadrant of abdomen for approximately 2 weeks seen earlier in day @ Urology office not controlled w OP pain regimen  EXAM Repeat CT:  persistent distal left ureteral stone and mildly increased hydronephrosis.  UA negative for infection.  Creatinine stable , given IVF, antiemetics Tylenol, toradol & Dilaudid  Cont IVF, NPO at MN, consult Urology AM [facility]  Urology  seen and examined in the clinic this afternoon and instructed to continue with medical expulsive therapy with strict ER precautions.   No leukocytosis, kidney function remains at baseline, and vital signs are stable. Plan will be to keep NPO at midnight in case of surgical intervention tomorrow morning. Formal urology consultation to follow tomorrow  on 11/14/2024.   Anticipated Length of Stay/Certification Statement: Patient will be admitted on an observation basis with an anticipated length of stay of less than 2 midnights secondary to pain control, urology eval.   Date: 11/14/2024   Day 2:   Left distal ureteric calculi 4 mm, with hydronephrosis no sign of acute infection, monitor off antibiotics, currently n.p.o. for surgical intervention by urology.   Urology  Distal ureteral calculus, failed trial of conservative management  Discussed options for management of the patient's ureteral stone.  We discussed surgical options including ureteroscopy and shock wave lithotripsy.  In addition, we discussed conservative management with medical expulsive therapy.  The patient has elected to undergo ureteroscopy.  Discussed with the patients risks and benefits and alternatives to ureteroscopy with laser lithotripsy.   The patient understands these risks and has provided informed consent for  LEFT ureteroscopy.  Plan: Ureteroscopy.  Patient would likely be medically stable for discharge home later today follow recovery from surgery  DATE OF PROCEDURE:   11/14/2024   PROCEDURES PERFORMED:  1) Cystoscopy  2) Left retrograde pyelography with fluoroscopic interpretation  3) Left ureteroscopy with basket extraction of stone  4) Left ureteral stent placement   ANESTHESIA: General    FINDINGS:  Successful basket extraction of solitary small left distal ureteral calculus.  Postoperative stent left in place with an external string.   DATE 11/15/2024  POD #1  left ureteroscopy with basket extraction of stone and left ureteral stent placement   **Overnight pt endorsed burning with urination and bladder spasms. Night team ordered oxybutynin. Saw and examined pt at bedside this morning. Pt is concerned about her post-op symptoms and is concerned because she believes her string from her stent may have been pulled out   Continue pain regimen post-operation  On alfuxosin as outpatient  which is nonformulary.  Cannot take flomax    ED Treatment-Medication Administration from 11/13/2024 1857 to 11/13/2024 2345         Date/Time Order Dose Route Action     11/13/2024 1954 sodium chloride 0.9 % bolus 1,000 mL 1,000 mL Intravenous New Bag     11/13/2024 1954 ondansetron (ZOFRAN) injection 4 mg 4 mg Intravenous Given     11/13/2024 1954 ketorolac (TORADOL) injection 15 mg 15 mg Intravenous Given     11/13/2024 1955 HYDROmorphone (DILAUDID) injection 0.5 mg 0.5 mg Intravenous Given     11/13/2024 1954 acetaminophen (Ofirmev) injection 1,000 mg 1,000 mg Intravenous New Bag     11/13/2024 2043 potassium chloride (Klor-Con M20) CR tablet 40 mEq 40 mEq Oral Given     11/13/2024 2125 HYDROmorphone (DILAUDID) injection 0.5 mg 0.5 mg Intravenous Given     11/13/2024 2125 sodium chloride 0.9 % infusion 125 mL/hr Intravenous New Bag            Scheduled Medications:     Continuous IV Infusions:  sodium chloride, 125 mL/hr, Intravenous, Continuous      PRN Meds:  HYDROmorphone, 0.2 mg, Intravenous, Q6H PRN  oxyCODONE, 5 mg, Oral, Q6H PRN  oxyCODONE, 2.5 mg, Oral, Q6H PRN  senna-docusate sodium, 2 tablet, Oral, HS PRN      ED Triage Vitals   Temperature Pulse Respirations Blood Pressure SpO2 Pain Score   11/13/24 1915 11/13/24 1915 11/13/24 1915 11/13/24 1915 11/13/24 1915 11/13/24 1955   98.6 °F (37 °C) 90 18 168/79 99 % 10 - Worst Possible Pain     Weight (last 2 days)       None            Vital Signs (last 3 days)       Date/Time Temp Pulse Resp BP MAP (mmHg) SpO2 O2 Device Patient Position - Orthostatic VS Pain    11/14/24 0728 -- 62 -- 89/56 67 -- -- Lying --    11/14/24 0727 97.9 °F (36.6 °C) 62 16 88/52 63 96 % None (Room air) Lying --    11/14/24 0341 -- -- -- -- -- -- -- -- 7    11/13/24 2352 -- -- -- -- -- -- -- -- 4    11/13/24 2351 97.7 °F (36.5 °C) 80 16 105/58 76 98 % None (Room air) Lying --    11/13/24 2128 -- 81 -- 109/55 79 100 % None (Room air) -- --    11/13/24 2043 -- -- -- -- -- -- -- -- 6     11/13/24 2000 -- 79 -- 117/81 95 100 % None (Room air) -- --    11/13/24 1955 -- -- -- -- -- -- -- -- 10 - Worst Possible Pain    11/13/24 1915 98.6 °F (37 °C) 90 18 168/79 -- 99 % None (Room air) Sitting --              Pertinent Labs/Diagnostic Test Results:   Radiology:  CT renal stone study abdomen pelvis without contrast   ED Interpretation by Obey Lala PA-C (11/13 2028)   Details      Reading Physician Reading Date Result Priority  Lucie Wilcox MD  [phone]    11/13/2024     Narrative & Impression  CT ABDOMEN AND PELVIS WITHOUT IV CONTRAST - LOW DOSE RENAL STONE     INDICATION:   left flank pain with recent dx of left ureteral stone.     COMPARISON: 10/31/2024     TECHNIQUE:  Low dose thin section CT examination of the abdomen and pelvis was performed without intravenous or oral contrast according to a protocol specifically designed to evaluate for urinary tract calculus.  Axial, sagittal, and coronal 2D   reformatted images were created from the source data and submitted for interpretation.   Evaluation for pathology in the abdomen and pelvis that is unrelated to urinary tract calculi is limited.     Radiation dose length product (DLP) for this visit:  177 mGy-cm .  This examination, like all CT scans performed in the [facility], was performed utilizing techniques to minimize radiation dose exposure, including the use of iterati   ve   reconstruction and automated exposure control.     URINARY TRACT FINDINGS:     RIGHT KIDNEY AND URETER:  No urinary tract calculi.  No hydronephrosis or hydroureter. Within normal limits.     LEFT KIDNEY AND URETER:  Persistent and mildly increased ureteral pelviectasis. 0.4 cm left pelvic stone appears to be in the distal left ureter, stable position immediately proximal to the UVJ.  No perinephric fluid.     URINARY BLADDER:  Within normal limits.  Not distended.     OTHER:     LOWER CHEST:  No clinically significant abnormality identified in the  "visualized lower chest.     LIVER/BILIARY TREE:  Within normal limits.     GALLBLADDER: Cholecystectomy     SPLEEN:  Within normal limits.     PANCREAS:  Within normal limits.     ADRENAL GLANDS:  Within normal limits.     STOMACH AND BOWEL:  Within normal limits.     APPENDIX:  Within normal limits.     ABDOMINOPELVIC CAVITY:  No abnormal air, fluid or enlarged lymph nodes.     VESSELS:  Limit evaluation without IV contrast. Normal ao   rtic caliber.     PELVIS:     REPRODUCTIVE ORGANS:  Within normal limits for age.     ABDOMINAL WALL/INGUINAL REGIONS: Unchanged, small, fat-containing umbilical hernia.     BONES: Within normal limits.        IMPRESSION:     Persistent distal left ureteral stone and mildly increased hydronephrosis.        Workstation performed: [id number]             Final Interpretation by Lucie Wilcox MD (11/13 2009)      Persistent distal left ureteral stone and mildly increased hydronephrosis.         Workstation performed: [id number]           Cardiology:  No orders to display     GI:  No orders to display           Results from last 7 days   Lab Units 11/13/24 1919   WBC Thousand/uL 4.69   HEMOGLOBIN g/dL 11.3*   HEMATOCRIT % 36.5   PLATELETS Thousands/uL 262   TOTAL NEUT ABS Thousands/µL 2.07         Results from last 7 days   Lab Units 11/13/24 1919   SODIUM mmol/L 138   POTASSIUM mmol/L 3.2*   CHLORIDE mmol/L 103   CO2 mmol/L 24   ANION GAP mmol/L 11   BUN mg/dL 12   CREATININE mg/dL 0.81   EGFR ml/min/1.73sq m 84   CALCIUM mg/dL 9.6     Results from last 7 days   Lab Units 11/13/24 1919   AST U/L 14   ALT U/L 10   ALK PHOS U/L 69   TOTAL PROTEIN g/dL 8.3   ALBUMIN g/dL 4.4   TOTAL BILIRUBIN mg/dL 0.47         Results from last 7 days   Lab Units 11/13/24 1919   GLUCOSE RANDOM mg/dL 94             No results found for: \"BETA-HYDROXYBUTYRATE\"                           Results from last 7 days   Lab Units 11/13/24 1919   PROTIME seconds 13.8   INR  0.99   PTT seconds 31           "   Results from last 7 days   Lab Units 11/13/24 1953   LACTIC ACID mmol/L 2.0                                 Results from last 7 days   Lab Units 11/13/24  1919   LIPASE u/L 18                 Results from last 7 days   Lab Units 11/13/24  1940 11/13/24  1034   CLARITY UA  Clear clear   COLOR UA  Light Yellow yellow   SPEC GRAV UA  1.019  --    PH UA  5.5  --    GLUCOSE UA mg/dl Negative -   KETONES UA mg/dl Negative trace   BLOOD UA  Trace* -   PROTEIN UA mg/dl Negative 30   NITRITE UA  Negative -   BILIRUBIN UA  Negative  --    BILIRUBIN UA POC   --  -   UROBILINOGEN UA   --  0.2   UROBILINOGEN UA (BE) mg/dl <2.0  --    LEUKOCYTES UA  Negative -   WBC UA /hpf 1-2  --    RBC UA /hpf 1-2  --    BACTERIA UA /hpf None Seen  --    EPITHELIAL CELLS WET PREP /hpf Occasional  --                                                    Past Medical History:   Diagnosis Date    Abnormal Pap smear of cervix     Alcohol intoxication (HCC) 08/19/2021    ED visit    Allergies     bactrim, IV contrast dye    Double vision 09/03/2020    Last assessed 01/13/2021    Enlarged pituitary gland (HCC)     Excessive crying of adult 11/23/2020    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 female    Network Utilization Review Department  ATTENTION: Please call with any questions or concerns to [phone] and carefully listen to the prompts so that you are directed to the right person. All voicemails are confidential.   For Discharge needs, contact Care Management DC Support Team at [phone] opt. [phone]  Send all requests  for admission clinical reviews, approved or denied determinations and any other requests to dedicated fax number below belonging to the campus where the patient is receiving treatment. List of dedicated fax numbers for the Facilities:  FACILITY NAME UR FAX NUMBER   ADMISSION DENIALS (Administrative/Medical Necessity) [phone]   DISCHARGE SUPPORT TEAM (NETWORK) [phone]   PARENT CHILD HEALTH (Maternity/NICU/Pediatrics) [phone]   [facility] [phone]   [facility] [phone]   Pending sale to [facility] [phone]   [facility] [phone]   [facility] [phone]   [facility] [phone]   [facility] [phone]   [facility] [phone]   [facility] [phone]   [facility] [phone]   [facility] [phone]   [facility] [phone]

## 2024-11-15 VITALS
HEART RATE: 65 BPM | RESPIRATION RATE: 18 BRPM | TEMPERATURE: 97.7 F | OXYGEN SATURATION: 97 % | DIASTOLIC BLOOD PRESSURE: 58 MMHG | SYSTOLIC BLOOD PRESSURE: 106 MMHG

## 2024-11-15 PROCEDURE — 99239 HOSP IP/OBS DSCHRG MGMT >30: CPT | Performed by: INTERNAL MEDICINE

## 2024-11-15 RX ORDER — OXYBUTYNIN CHLORIDE 5 MG/1
5 TABLET ORAL 4 TIMES DAILY PRN
Status: DISCONTINUED | OUTPATIENT
Start: 2024-11-15 | End: 2024-11-15 | Stop reason: HOSPADM

## 2024-11-15 RX ADMIN — OXYCODONE HYDROCHLORIDE 5 MG: 5 TABLET ORAL at 06:12

## 2024-11-15 RX ADMIN — HYDROMORPHONE HYDROCHLORIDE 0.2 MG: 0.2 INJECTION, SOLUTION INTRAMUSCULAR; INTRAVENOUS; SUBCUTANEOUS at 10:22

## 2024-11-15 RX ADMIN — OXYBUTYNIN CHLORIDE 5 MG: 5 TABLET ORAL at 01:08

## 2024-11-15 RX ADMIN — HYDROMORPHONE HYDROCHLORIDE 0.2 MG: 0.2 INJECTION, SOLUTION INTRAMUSCULAR; INTRAVENOUS; SUBCUTANEOUS at 02:25

## 2024-11-15 RX ADMIN — OXYBUTYNIN CHLORIDE 5 MG: 5 TABLET ORAL at 07:52

## 2024-11-15 RX ADMIN — PHENAZOPYRIDINE 200 MG: 100 TABLET ORAL at 07:52

## 2024-11-15 RX ADMIN — PHENAZOPYRIDINE 200 MG: 100 TABLET ORAL at 12:13

## 2024-11-15 NOTE — ANESTHESIA POSTPROCEDURE EVALUATION
Post-Op Assessment Note    CV Status:  Stable    Pain management: adequate       Mental Status:  Alert and awake   Hydration Status:  Euvolemic   PONV Controlled:  Controlled   Airway Patency:  Patent     Post Op Vitals Reviewed: Yes    No anethesia notable event occurred.    Staff: Anesthesiologist, CRNA           Last Filed PACU Vitals:  Vitals Value Taken Time   Temp 97.7 °F (36.5 °C) 11/14/24 1503   Pulse 94 11/14/24 1521   /60 11/14/24 1515   Resp 20 11/14/24 1521   SpO2 97 % 11/14/24 1521   Vitals shown include unfiled device data.    Modified Jm:  Activity: 2 (11/14/2024  3:15 PM)  Respiration: 2 (11/14/2024  3:15 PM)  Circulation: 2 (11/14/2024  3:15 PM)  Consciousness: 2 (11/14/2024  3:15 PM)  Oxygen Saturation: 2 (11/14/2024  3:15 PM)  Modified Jm Score: 10 (11/14/2024  3:15 PM)

## 2024-11-15 NOTE — PLAN OF CARE
Problem: PAIN - ADULT  Goal: Verbalizes/displays adequate comfort level or baseline comfort level  Description: Interventions:  - Encourage patient to monitor pain and request assistance  - Assess pain using appropriate pain scale  - Administer analgesics based on type and severity of pain and evaluate response  - Implement non-pharmacological measures as appropriate and evaluate response  - Consider cultural and social influences on pain and pain management  - Notify physician/advanced practitioner if interventions unsuccessful or patient reports new pain  Outcome: Progressing     Problem: INFECTION - ADULT  Goal: Absence or prevention of progression during hospitalization  Description: INTERVENTIONS:  - Assess and monitor for signs and symptoms of infection  - Monitor lab/diagnostic results  - Monitor all insertion sites, i.e. indwelling lines, tubes, and drains  - Monitor endotracheal if appropriate and nasal secretions for changes in amount and color  - Anderson appropriate cooling/warming therapies per order  - Administer medications as ordered  - Instruct and encourage patient and family to use good hand hygiene technique  - Identify and instruct in appropriate isolation precautions for identified infection/condition  Outcome: Progressing  Goal: Absence of fever/infection during neutropenic period  Description: INTERVENTIONS:  - Monitor WBC    Outcome: Progressing     Problem: SAFETY ADULT  Goal: Patient will remain free of falls  Description: INTERVENTIONS:  - Educate patient/family on patient safety including physical limitations  - Instruct patient to call for assistance with activity   - Consult OT/PT to assist with strengthening/mobility   - Keep Call bell within reach  - Keep bed low and locked with side rails adjusted as appropriate  - Keep care items and personal belongings within reach  - Initiate and maintain comfort rounds  - Make Fall Risk Sign visible to staff  - Offer Toileting every  Hours,  in advance of need  - Initiate/Maintain alarm  - Obtain necessary fall risk management equipment:   - Apply yellow socks and bracelet for high fall risk patients  - Consider moving patient to room near nurses station  Outcome: Progressing  Goal: Maintain or return to baseline ADL function  Description: INTERVENTIONS:  -  Assess patient's ability to carry out ADLs; assess patient's baseline for ADL function and identify physical deficits which impact ability to perform ADLs (bathing, care of mouth/teeth, toileting, grooming, dressing, etc.)  - Assess/evaluate cause of self-care deficits   - Assess range of motion  - Assess patient's mobility; develop plan if impaired  - Assess patient's need for assistive devices and provide as appropriate  - Encourage maximum independence but intervene and supervise when necessary  - Involve family in performance of ADLs  - Assess for home care needs following discharge   - Consider OT consult to assist with ADL evaluation and planning for discharge  - Provide patient education as appropriate  Outcome: Progressing  Goal: Maintains/Returns to pre admission functional level  Description: INTERVENTIONS:  - Perform AM-PAC 6 Click Basic Mobility/ Daily Activity assessment daily.  - Set and communicate daily mobility goal to care team and patient/family/caregiver.   - Collaborate with rehabilitation services on mobility goals if consulted  - Perform Range of Motion  times a day.  - Reposition patient every  hours.  - Dangle patient  times a day  - Stand patient times a day  - Ambulate patient  times a day  - Out of bed to chair  times a day   - Out of bed for meals  times a day  - Out of bed for toileting  - Record patient progress and toleration of activity level   Outcome: Progressing

## 2024-11-16 DIAGNOSIS — E66.811 CLASS 1 OBESITY WITHOUT SERIOUS COMORBIDITY WITH BODY MASS INDEX (BMI) OF 32.0 TO 32.9 IN ADULT, UNSPECIFIED OBESITY TYPE: ICD-10-CM

## 2024-11-17 ENCOUNTER — NURSE TRIAGE (OUTPATIENT)
Dept: OTHER | Facility: OTHER | Age: 49
End: 2024-11-17

## 2024-11-17 NOTE — TELEPHONE ENCOUNTER
"Answer Assessment - Initial Assessment Questions  1. SYMPTOM: \"What's the main symptom you're concerned about?\" (e.g., pain, fever, vomiting)      Chills and nausea    2. ONSET: \"When did chills and nausea  start?\"      Since awaking    3. SURGERY: \"What surgery did you have?\"      Kidney stones    4. DATE of SURGERY: \"When was the surgery?\"       Thursday 14th    5. ANESTHESIA: \"What type of anesthesia did you have?\" (e.g., general, spinal, epidural, local)      N/A    6. DRAINS: \"Were any drains place in or around the wound?\" (e.g., Hemovac, Brian-Daniels, Penrose)      N/A    7. PAIN: \"Is there any pain?\" If Yes, ask: \"How bad is it?\"  (Scale 1-10; or mild, moderate, severe)      \"Stabbing\" type of pain in urethra area, positional pain needs to sit sideways, 7/10    8. FEVER: \"Do you have a fever?\" If Yes, ask: \"What is your temperature, how was it measured, and when did it start?\"      Denies, but chills and fatigue    9. VOMITING: \"Is there any vomiting?\" If Yes, ask: \"How many times?\"      Denies,fighting it    10. BLEEDING: \"Is there any bleeding?\" If Yes, ask: \"How much?\" and \"Where?\"        Denies, no blood in urine    11. OTHER SYMPTOMS: \"Do you have any other symptoms?\" (e.g., drainage from wound, painful urination, constipation)        Decreased appetite due to nausea  Barely drinking, last urinated 15 minutes    Protocols used: Post-Op Symptoms and Questions-Adult-      On call provider recommends hydrate and medications are prescribed, symptoms should improve upon stent removal tomorrow.  Patient advised and verbalized understanding.  "

## 2024-11-17 NOTE — TELEPHONE ENCOUNTER
"Regarding: kidney stone removal / chills / nauseous  ----- Message from Zoë AYALA sent at 11/17/2024  6:25 PM EST -----  \"I had my kidney stones removed on Thursday and I've been nauseous all day I took medication but it didn't really help, is have the chills and I'm very uncomfortable\"    "

## 2024-11-17 NOTE — TELEPHONE ENCOUNTER
"Reason for Disposition   Other post-op symptom or question    Answer Assessment - Initial Assessment Questions  1. SYMPTOM: \"What's the main symptom you're concerned about?\" (e.g., pain, fever, vomiting)      Nausea, headache, chills, and fatigue. No vomiting. Drinking fluids. Urinating normally.     2. ONSET: \"When did   start?\"      Today     3. SURGERY: \"What surgery did you have?\"      CYSTOSCOPY, LEFT URETEROSCOPY, LEFT RETROGRADE PYELOGRAM, BASKET STONE EXTRACTION, AND INSERTION STENT LEFT URETERAL (Left: Bladder)    4. DATE of SURGERY: \"When was the surgery?\"       11/14/24    5. ANESTHESIA: \"What type of anesthesia did you have?\" (e.g., general, spinal, epidural, local)      General    6. DRAINS: \"Were any drains place in or around the wound?\" (e.g., Hemovac, Brian-Daniels, Penrose)      N/a    7. PAIN: \"Is there any pain?\" If Yes, ask: \"How bad is it?\"  (Scale 1-10; or mild, moderate, severe)      No pain.     8. FEVER: \"Do you have a fever?\" If Yes, ask: \"What is your temperature, how was it measured, and when did it start?\"      Temp: 96.5 (Axillary) in the last 30 minutes.     9. VOMITING: \"Is there any vomiting?\" If Yes, ask: \"How many times?\"      No vomiting    10. BLEEDING: \"Is there any bleeding?\" If Yes, ask: \"How much?\" and \"Where?\"        No    11. OTHER SYMPTOMS: \"Do you have any other symptoms?\" (e.g., drainage from wound, painful urination, constipation)     No other symptoms. Is on pyridium, oxybutynin, naproxen, colace, and oxycodone PRN.    Protocols used: Post-Op Symptoms and Questions-Adult-ELIS      Wanted to know if it is ok for her to take Zofran along with her other Uro meds that were prescribed. No contraindications noted for Zofran with pyridium, oxybutynin, naproxen, colace, or oxycodone per [facility]. Zofran was prescribed by pts PCP for nausea from Zepbound inj.       "

## 2024-11-17 NOTE — TELEPHONE ENCOUNTER
"Regarding: post op ureteral stone/nausea/chills/fatigue  ----- Message from Vanessa AYALA sent at 11/17/2024 11:35 AM EST -----  Pt's spouse stated, \"My wife had surgery for her kidney stones. She is having chills nausea and fatigue. I would like to discuss her medications.\"    "

## 2024-11-18 ENCOUNTER — PROCEDURE VISIT (OUTPATIENT)
Dept: UROLOGY | Facility: CLINIC | Age: 49
End: 2024-11-18

## 2024-11-18 VITALS — SYSTOLIC BLOOD PRESSURE: 122 MMHG | HEART RATE: 67 BPM | OXYGEN SATURATION: 99 % | DIASTOLIC BLOOD PRESSURE: 86 MMHG

## 2024-11-18 DIAGNOSIS — N20.0 NEPHROLITHIASIS: Primary | ICD-10-CM

## 2024-11-18 PROCEDURE — 99024 POSTOP FOLLOW-UP VISIT: CPT

## 2024-11-18 NOTE — PROGRESS NOTES
11/18/2024  Lolis Huber is a 49 y.o. female  [id number]        Diagnosis      Patient is s/p left ureteroscopy with stone extraction on 11/13/24 with Dr. Yoo    Plan  Patient will return as scheduled with imaging PTV      Procedure Stent with String Removal    Vitals:    11/18/24 1053   BP: 122/86   BP Location: Left arm   Patient Position: Sitting   Cuff Size: Adult   Pulse: 67   SpO2: 99%       Stent with string removed intact without difficulty. Reviewed post stent removal symptoms including flank pain, dysuria, and hematuria. Instructed patient to increase oral fluid intake.  Encouraged the use of NSAIDS and other prescribed pain medication as needed for discomfort. Patient instructed to call the office or report to the ER for uncontrolled pain, fever, chills, nausea or vomiting.       Bette Lockett RN

## 2024-11-18 NOTE — TELEPHONE ENCOUNTER
Lolis Huber MRN: [id number]    Date: 11/18/2024 Status: [facility]   Time: 11:30 AM Length: 30   Visit Type: PROCEDURE LONG PG [[id number]] Copay: $30.00   Provider: UROLOGY NURSE [place] Department: [facility] Area:  Department Phone: [phone]   Referral Number:   Referral Status:         Auto Confirm Status: Confirmed       And text messges were delivered.

## 2024-11-19 ENCOUNTER — NURSE TRIAGE (OUTPATIENT)
Age: 49
End: 2024-11-19

## 2024-11-19 RX ORDER — TIRZEPATIDE 7.5 MG/.5ML
7.5 INJECTION, SOLUTION SUBCUTANEOUS WEEKLY
Qty: 2 ML | Refills: 0 | Status: SHIPPED | OUTPATIENT
Start: 2024-11-19

## 2024-11-19 NOTE — TELEPHONE ENCOUNTER
"Patient has been treated recently with narcotics for kidney stone.  Patient calls in today and states that she has not had a BM Since 11/16.  Patient is nauseous and burping.  Not tolerating PO.  Had very small amount of watery brown BM yesterday.  Patient states that she is not passing flatus.  Feels bloated, but denies pain.  Denies fever.  She has tried OTC laxatives, without results.  Advised that patient go to the ED for further evaluation to R/O blockage.  She will go to [facility].     Reason for Disposition   Patient sounds very sick or weak to the triager    Answer Assessment - Initial Assessment Questions  1. STOOL PATTERN OR FREQUENCY: \"How often do you have a bowel movement (BM)?\"  (Normal range: 3 times a day to every 3 days)  \"When was your last BM?\"        11/17, a small amount.   2. STRAINING: \"Do you have to strain to have a BM?\"       Yes   3. ONSET: \"When did the constipation begin?\"      11/17  4. RECTAL PAIN: \"Does your rectum hurt when the stool comes out?\" If Yes, ask: \"Do you have hemorrhoids? How bad is the pain?\"  (Scale 1-10; or mild, moderate, severe)      Denies  5. BM COMPOSITION: \"Are the stools hard?\"       A little watery.   6. BLOOD ON STOOLS: \"Has there been any blood on the toilet tissue or on the surface of the BM?\" If Yes, ask: \"When was the last time?\"      Denies   7. CHRONIC CONSTIPATION: \"Is this a new problem for you?\"  If No, ask: \"How long have you had this problem?\" (days, weeks, months)       Denies  8. CHANGES IN DIET OR HYDRATION: \"Have there been any recent changes in your diet?\" \"How much fluids are you drinking on a daily basis?\"  \"How much have you had to drink today?\"      Not drinking that much.   9. MEDICINES: \"Have you been taking any new medicines?\" \"Are you taking any narcotic pain medicines?\" (e.g., Dilaudid, morphine, Percocet, Vicodin)      Recent oxycodone for kidney stone   10. LAXATIVES: \"Have you been using any stool softeners, laxatives, or " "enemas?\"  If Yes, ask \"What, how often, and when was the last time?\"        Dulcolax tabs, suppository, stool softeners   11. ACTIVITY:  \"How much walking do you do every day?\"  \"Has your activity level decreased in the past week?\"         Decreased   12. CAUSE: \"What do you think is causing the constipation?\"         Narcotics   13. MEDICAL HISTORY: \"Do you have a history of hemorrhoids, rectal fissures, rectal surgery, or rectal abscess?\"          N/A  14. OTHER SYMPTOMS: \"Do you have any other symptoms?\" (e.g., abdomen pain, bloating, fever, vomiting)        Nauseous, sipping water, not passing flatus.  Burping a lot.    Protocols used: Constipation-Adult-[place]    "

## 2024-11-20 ENCOUNTER — HOSPITAL ENCOUNTER (EMERGENCY)
Facility: HOSPITAL | Age: 49
Discharge: HOME | End: 2024-11-20
Attending: EMERGENCY MEDICINE
Payer: COMMERCIAL

## 2024-11-20 VITALS
OXYGEN SATURATION: 100 % | HEART RATE: 70 BPM | DIASTOLIC BLOOD PRESSURE: 62 MMHG | RESPIRATION RATE: 18 BRPM | BODY MASS INDEX: 29.31 KG/M2 | TEMPERATURE: 98.1 F | SYSTOLIC BLOOD PRESSURE: 121 MMHG | WEIGHT: 160.27 LBS

## 2024-11-20 DIAGNOSIS — R11.0 NAUSEA: Primary | ICD-10-CM

## 2024-11-20 LAB
ALBUMIN SERPL BCG-MCNC: 4.4 G/DL (ref 3.5–5)
ALP SERPL-CCNC: 91 U/L (ref 34–104)
ALT SERPL W P-5'-P-CCNC: 47 U/L (ref 7–52)
ANION GAP SERPL CALCULATED.3IONS-SCNC: 7 MMOL/L (ref 4–13)
AST SERPL W P-5'-P-CCNC: 27 U/L (ref 13–39)
BASOPHILS # BLD AUTO: 0.03 THOUSANDS/ÂΜL (ref 0–0.1)
BASOPHILS NFR BLD AUTO: 1 % (ref 0–1)
BILIRUB SERPL-MCNC: 0.53 MG/DL (ref 0.2–1)
BUN SERPL-MCNC: 15 MG/DL (ref 5–25)
CALCIUM OXALATE DIHYDRATE MFR STONE IR: 20 %
CALCIUM SERPL-MCNC: 9.5 MG/DL (ref 8.4–10.2)
CHLORIDE SERPL-SCNC: 103 MMOL/L (ref 96–108)
CO2 SERPL-SCNC: 27 MMOL/L (ref 21–32)
COLOR STONE: NORMAL
COM MFR STONE: 75 %
COMMENT-STONE3: NORMAL
COMPOSITION: NORMAL
CREAT SERPL-MCNC: 0.65 MG/DL (ref 0.6–1.3)
EOSINOPHIL # BLD AUTO: 0.04 THOUSAND/ÂΜL (ref 0–0.61)
EOSINOPHIL NFR BLD AUTO: 1 % (ref 0–6)
ERYTHROCYTE [DISTWIDTH] IN BLOOD BY AUTOMATED COUNT: 14.8 % (ref 11.6–15.1)
GFR SERPL CREATININE-BSD FRML MDRD: 103 ML/MIN/1.73SQ M
GLUCOSE SERPL-MCNC: 76 MG/DL (ref 65–140)
HCT VFR BLD AUTO: 36.6 % (ref 34.8–46.1)
HGB BLD-MCNC: 11.7 G/DL (ref 11.5–15.4)
HYDROXYAPATITE 24H ENGDIFF UR: 5 %
IMM GRANULOCYTES # BLD AUTO: 0.01 THOUSAND/UL (ref 0–0.2)
IMM GRANULOCYTES NFR BLD AUTO: 0 % (ref 0–2)
LABORATORY COMMENT REPORT: NORMAL
LYMPHOCYTES # BLD AUTO: 1.5 THOUSANDS/ÂΜL (ref 0.6–4.47)
LYMPHOCYTES NFR BLD AUTO: 34 % (ref 14–44)
MCH RBC QN AUTO: 26.7 PG (ref 26.8–34.3)
MCHC RBC AUTO-ENTMCNC: 32 G/DL (ref 31.4–37.4)
MCV RBC AUTO: 84 FL (ref 82–98)
MONOCYTES # BLD AUTO: 0.27 THOUSAND/ÂΜL (ref 0.17–1.22)
MONOCYTES NFR BLD AUTO: 6 % (ref 4–12)
NEUTROPHILS # BLD AUTO: 2.51 THOUSANDS/ÂΜL (ref 1.85–7.62)
NEUTS SEG NFR BLD AUTO: 58 % (ref 43–75)
NRBC BLD AUTO-RTO: 0 /100 WBCS
PHOTO: NORMAL
PLATELET # BLD AUTO: 264 THOUSANDS/UL (ref 149–390)
PMV BLD AUTO: 8.6 FL (ref 8.9–12.7)
POTASSIUM SERPL-SCNC: 4.6 MMOL/L (ref 3.5–5.3)
PROT SERPL-MCNC: 8.2 G/DL (ref 6.4–8.4)
RBC # BLD AUTO: 4.38 MILLION/UL (ref 3.81–5.12)
SIZE STONE: NORMAL MM
SODIUM SERPL-SCNC: 137 MMOL/L (ref 135–147)
SPEC SOURCE SUBJ: NORMAL
STONE ANALYSIS-IMP: NORMAL
WBC # BLD AUTO: 4.36 THOUSAND/UL (ref 4.31–10.16)
WT STONE: 36 MG

## 2024-11-20 PROCEDURE — 36415 COLL VENOUS BLD VENIPUNCTURE: CPT | Performed by: EMERGENCY MEDICINE

## 2024-11-20 PROCEDURE — 96361 HYDRATE IV INFUSION ADD-ON: CPT

## 2024-11-20 PROCEDURE — 99284 EMERGENCY DEPT VISIT MOD MDM: CPT | Performed by: EMERGENCY MEDICINE

## 2024-11-20 PROCEDURE — 96374 THER/PROPH/DIAG INJ IV PUSH: CPT

## 2024-11-20 PROCEDURE — 96375 TX/PRO/DX INJ NEW DRUG ADDON: CPT

## 2024-11-20 PROCEDURE — 80053 COMPREHEN METABOLIC PANEL: CPT | Performed by: EMERGENCY MEDICINE

## 2024-11-20 PROCEDURE — 99284 EMERGENCY DEPT VISIT MOD MDM: CPT

## 2024-11-20 PROCEDURE — 85025 COMPLETE CBC W/AUTO DIFF WBC: CPT

## 2024-11-20 RX ORDER — METOCLOPRAMIDE 10 MG/1
10 TABLET ORAL EVERY 6 HOURS PRN
Qty: 30 TABLET | Refills: 0 | Status: SHIPPED | OUTPATIENT
Start: 2024-11-20

## 2024-11-20 RX ORDER — METOCLOPRAMIDE HYDROCHLORIDE 5 MG/ML
10 INJECTION INTRAMUSCULAR; INTRAVENOUS ONCE
Status: COMPLETED | OUTPATIENT
Start: 2024-11-20 | End: 2024-11-20

## 2024-11-20 RX ORDER — FAMOTIDINE 10 MG/ML
20 INJECTION, SOLUTION INTRAVENOUS ONCE
Status: COMPLETED | OUTPATIENT
Start: 2024-11-20 | End: 2024-11-20

## 2024-11-20 RX ORDER — ONDANSETRON 2 MG/ML
4 INJECTION INTRAMUSCULAR; INTRAVENOUS ONCE
Status: COMPLETED | OUTPATIENT
Start: 2024-11-20 | End: 2024-11-20

## 2024-11-20 RX ADMIN — FAMOTIDINE 20 MG: 10 INJECTION INTRAVENOUS at 09:15

## 2024-11-20 RX ADMIN — ONDANSETRON 4 MG: 2 INJECTION INTRAMUSCULAR; INTRAVENOUS at 09:13

## 2024-11-20 RX ADMIN — METOCLOPRAMIDE 10 MG: 5 INJECTION, SOLUTION INTRAMUSCULAR; INTRAVENOUS at 11:17

## 2024-11-20 RX ADMIN — SODIUM CHLORIDE 1000 ML: 0.9 INJECTION, SOLUTION INTRAVENOUS at 09:34

## 2024-11-20 NOTE — ED PROVIDER NOTES
Time reflects when diagnosis was documented in both MDM as applicable and the Disposition within this note       Time User Action Codes Description Comment    11/20/2024 11:40 AM Jose Gabrielle Add [R11.0] Nausea           ED Disposition       ED Disposition   Discharge    Condition   Stable    Date/Time   Wed Nov 20, 2024 11:40 AM    Comment   Lolis Huber discharge to home/self care.                   Assessment & Plan       Medical Decision Making  49-year-old female presents for evaluation of nausea.    On initial evaluation, patient in no acute distress, resting comfortably in bed.  VSS.  Physical exam unremarkable.  Labs obtained, IVF bolus, Zofran, Pepcid given.  Labs grossly unremarkable.  On reevaluation, patient endorses improvement in symptoms, and was able to pass a p.o. challenge.  However, prior to dispo, she endorsed return of nausea, which improved after giving Reglan.  Patient was discharged with prescription for Reglan, and strict return precautions.    Amount and/or Complexity of Data Reviewed  Labs: ordered.    Risk  Prescription drug management.        ED Course as of 11/22/24 0811   Wed Nov 20, 2024   1136 On reevaluation, patient endorses improvement in symptoms after Reglan, Zofran and a liter of fluids.  Will discharge with prescription for Reglan for nausea.       Medications   ondansetron (ZOFRAN) injection 4 mg (4 mg Intravenous Given 11/20/24 0913)   Famotidine (PF) (PEPCID) injection 20 mg (20 mg Intravenous Given 11/20/24 0915)   sodium chloride 0.9 % bolus 1,000 mL (0 mL Intravenous Stopped 11/20/24 1034)   metoclopramide (REGLAN) injection 10 mg (10 mg Intravenous Given 11/20/24 1117)       ED Risk Strat Scores                           SBIRT 20yo+      Flowsheet Row Most Recent Value   Initial Alcohol Screen: US AUDIT-C     1. How often do you have a drink containing alcohol? 0 Filed at: 11/20/2024 1120   2. How many drinks containing alcohol do you have on a typical day you  are drinking?  0 Filed at: 2024 1120   3a. Male UNDER 65: How often do you have five or more drinks on one occasion? 0 Filed at: 2024 1120   3b. FEMALE Any Age, or MALE 65+: How often do you have 4 or more drinks on one occassion? 0 Filed at: 2024 1120   Audit-C Score 0 Filed at: 2024 1120   HARPREET: How many times in the past year have you...    Used an illegal drug or used a prescription medication for non-medical reasons? Never Filed at: 2024 1120                            History of Present Illness       Chief Complaint   Patient presents with    Nausea     Pt reports nausea - difficulty eating / drinking since having stent for kidney stones taken out Monday. Pt also reports burning in right foot       Past Medical History:   Diagnosis Date    Abnormal Pap smear of cervix     Alcohol intoxication (HCC) 2021    ED visit    Allergies     bactrim, IV contrast dye    Double vision 2020    Last assessed 2021    Enlarged pituitary gland (HCC)     Excessive crying of adult 2020    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 2020    Last assessed 2023    Hypokalemia 2024    Lateral epicondylitis of left elbow 2019    Last assessed 2019    Nephrolithiasis 2024    New daily persistent headache 2020    Last assessed 12/15/2020    Pre-operative clearance 2023    Shoulder pain, right 2019      Past Surgical History:   Procedure Laterality Date     SECTION      x 1    CHOLECYSTECTOMY  2016    COLONOSCOPY  2019    to be done in 5 years ()    COLONOSCOPY  2016    FL RETROGRADE PYELOGRAM  2024    GALLBLADDER SURGERY      KNEE SURGERY      MAMMO (HISTORICAL) Bilateral 2022    NORMAL    MI CYSTO/URETERO W/LITHOTRIPSY &INDWELL STENT INSRT Left 2024    Procedure: CYSTOSCOPY, LEFT URETEROSCOPY, LEFT RETROGRADE PYELOGRAM, BASKET STONE EXTRACTION, AND INSERTION STENT LEFT URETERAL;  Surgeon:  James Yoo MD;  Location: [facility];  Service: Urology    TUBAL LIGATION        Family History   Problem Relation Age of Onset    Rheum arthritis Mother     Lupus Mother     Arthritis Mother         Rheumatoid    Cancer Father     Heart disease Father         bypass    Melanoma Father     Liver cancer Maternal Grandmother     No Known Problems Sister     No Known Problems Brother     No Known Problems Son     No Known Problems Daughter     No Known Problems Sister     No Known Problems Daughter     No Known Problems Daughter     No Known Problems Daughter       Social History     Tobacco Use    Smoking status: Never    Smokeless tobacco: Never   Vaping Use    Vaping status: Never Used   Substance Use Topics    Alcohol use: Yes     Comment: Social drinker    Drug use: No      E-Cigarette/Vaping    E-Cigarette Use Never User       E-Cigarette/Vaping Substances    Nicotine No     THC No     CBD No     Flavoring No     Other No     Unknown No       I have reviewed and agree with the history as documented.     49-year-old female presents for evaluation of nausea without vomiting.  She has a history of kidney stones, s/p uteroscopy.  Nausea started about 2 days ago, after her discharge from the hospital for which she was admitted for stent removal.  She had decreased p.o. intake since her discharge from hospital, about 4 days ago, took oxycodone about 2 days ago, and also took her Zepbound injection on the same day, after which her symptoms began.  She has been unable to tolerate p.o. intake, and she endorses constipation.  She denies urinary symptoms, hematuria, vomiting, fever, abdominal pain.          Review of Systems   Constitutional:  Positive for fatigue. Negative for chills and fever.   Eyes:  Negative for pain and visual disturbance.   Respiratory:  Negative for cough and shortness of breath.    Cardiovascular:  Negative for chest pain and palpitations.   Gastrointestinal:  Positive for nausea.  Negative for abdominal pain and vomiting.   Genitourinary:  Negative for dysuria, flank pain, frequency and hematuria.   Musculoskeletal:  Negative for back pain.   Skin:  Negative for color change.   Neurological:  Negative for seizures and syncope.   All other systems reviewed and are negative.          Objective       ED Triage Vitals   Temperature Pulse Blood Pressure Respirations SpO2 Patient Position - Orthostatic VS   11/20/24 0826 11/20/24 0826 11/20/24 0826 11/20/24 0826 11/20/24 0826 11/20/24 1120   98.1 °F (36.7 °C) 85 150/77 18 98 % Sitting      Temp src Heart Rate Source BP Location FiO2 (%) Pain Score    -- 11/20/24 1120 11/20/24 1120 -- 11/20/24 0827     Monitor Right arm  5      Vitals      Date and Time Temp Pulse SpO2 Resp BP Pain Score FACES Pain Rating User   11/20/24 1120 -- 70 100 % 18 121/62 No Pain -- KRYSTAIN   11/20/24 0827 -- -- -- -- -- 5 -- MD   11/20/24 0826 98.1 °F (36.7 °C) 85 98 % 18 150/77 -- -- MD            Physical Exam  Vitals and nursing note reviewed.   Constitutional:       General: She is not in acute distress.     Appearance: She is well-developed.   HENT:      Head: Normocephalic and atraumatic.   Eyes:      Conjunctiva/sclera: Conjunctivae normal.   Cardiovascular:      Rate and Rhythm: Normal rate and regular rhythm.      Heart sounds: No murmur heard.  Pulmonary:      Effort: Pulmonary effort is normal. No respiratory distress.      Breath sounds: Normal breath sounds.   Abdominal:      Palpations: Abdomen is soft.      Tenderness: There is no abdominal tenderness.   Musculoskeletal:         General: No swelling.      Cervical back: Neck supple.   Skin:     General: Skin is warm and dry.      Capillary Refill: Capillary refill takes less than 2 seconds.   Neurological:      Mental Status: She is alert.         Results Reviewed       Procedure Component Value Units Date/Time    Comprehensive metabolic panel [[id number]] Collected: 11/20/24 0910    Lab Status: Final result  Specimen: Blood from Arm, Left Updated: 11/20/24 0942     Sodium 137 mmol/L      Potassium 4.6 mmol/L      Chloride 103 mmol/L      CO2 27 mmol/L      ANION GAP 7 mmol/L      BUN 15 mg/dL      Creatinine 0.65 mg/dL      Glucose 76 mg/dL      Calcium 9.5 mg/dL      AST 27 U/L      ALT 47 U/L      Alkaline Phosphatase 91 U/L      Total Protein 8.2 g/dL      Albumin 4.4 g/dL      Total Bilirubin 0.53 mg/dL      eGFR 103 ml/min/1.73sq m     Narrative:      National Kidney Disease Foundation guidelines for Chronic Kidney Disease (CKD):     Stage 1 with normal or high GFR (GFR > 90 mL/min/1.73 square meters)    Stage 2 Mild CKD (GFR = 60-89 mL/min/1.73 square meters)    Stage 3A Moderate CKD (GFR = 45-59 mL/min/1.73 square meters)    Stage 3B Moderate CKD (GFR = 30-44 mL/min/1.73 square meters)    Stage 4 Severe CKD (GFR = 15-29 mL/min/1.73 square meters)    Stage 5 End Stage CKD (GFR <15 mL/min/1.73 square meters)  Note: GFR calculation is accurate only with a steady state creatinine    CBC and differential [[id number]]  (Abnormal) Collected: 11/20/24 0910    Lab Status: Final result Specimen: Blood from Arm, Left Updated: 11/20/24 0922     WBC 4.36 Thousand/uL      RBC 4.38 Million/uL      Hemoglobin 11.7 g/dL      Hematocrit 36.6 %      MCV 84 fL      MCH 26.7 pg      MCHC 32.0 g/dL      RDW 14.8 %      MPV 8.6 fL      Platelets 264 Thousands/uL      nRBC 0 /100 WBCs      Segmented % 58 %      Immature Grans % 0 %      Lymphocytes % 34 %      Monocytes % 6 %      Eosinophils Relative 1 %      Basophils Relative 1 %      Absolute Neutrophils 2.51 Thousands/µL      Absolute Immature Grans 0.01 Thousand/uL      Absolute Lymphocytes 1.50 Thousands/µL      Absolute Monocytes 0.27 Thousand/µL      Eosinophils Absolute 0.04 Thousand/µL      Basophils Absolute 0.03 Thousands/µL             No orders to display       Procedures    ED Medication and Procedure Management   Prior to Admission Medications   Prescriptions Last Dose  Informant Patient Reported? Taking?   Zepbound 10 MG/0.5ML auto-injector  Self No No   Sig: Inject 0.5 mL (10 mg total) under the skin once a week for 28 days Do not start before December 26, 2024.   Zepbound 12.5 MG/0.5ML auto-injector  Self No No   Sig: Inject 0.5 mL (12.5 mg total) under the skin once a week for 28 days Do not start before January 23, 2025.   Zepbound 15 MG/0.5ML auto-injector  Self No No   Sig: Inject 0.5 mL (15 mg total) under the skin once a week Do not start before February 20, 2025.   Zepbound 2.5 MG/0.5ML auto-injector  Self No No   Sig: INJECT 2.5MG SUBCUTANEOUSLY (UNDER THE SKIN) ONCE WEEKLY   Zepbound 5 MG/0.5ML auto-injector  Self No No   Sig: INJECT 5MG SUBCUTANEOUSLY (UNDER THE SKIN) ONCE WEEKLY   Zepbound 7.5 MG/0.5ML auto-injector   No No   Sig: INJECT 7.5MG SUBCUTANEOUSLY (UNDER THE SKIN) ONCE WEEKLY   acetaminophen (TYLENOL) 325 mg tablet   No No   Sig: Take 2 tablets (650 mg total) by mouth every 4 (four) hours as needed for mild pain   alfuzosin (UROXATRAL) 10 mg 24 hr tablet  Self No No   Sig: Take 1 tablet (10 mg total) by mouth daily   docusate sodium (COLACE) 100 mg capsule   No No   Sig: Take 1 capsule (100 mg total) by mouth 2 (two) times a day for 15 days   ibuprofen (MOTRIN) 600 mg tablet  Self No No   Sig: Take 1 tablet (600 mg total) by mouth every 8 (eight) hours as needed for moderate pain   naproxen (NAPROSYN) 500 mg tablet   No No   Sig: Take 1 tablet (500 mg total) by mouth 2 (two) times a day with meals for 5 days For pain   ondansetron (ZOFRAN) 4 mg tablet  Self No No   Sig: Take 1 tablet (4 mg total) by mouth 2 (two) times a day as needed for nausea or vomiting   oxyCODONE (ROXICODONE) 5 immediate release tablet   No No   Sig: Take 1 tablet (5 mg total) by mouth every 4 (four) hours as needed for severe pain for up to 5 days Max Daily Amount: 30 mg   oxybutynin (DITROPAN) 5 mg tablet   No No   Sig: Take 1 tablet (5 mg total) by mouth every 6 (six) hours as  needed (bladder spasms)      Facility-Administered Medications: None     Discharge Medication List as of 11/20/2024 11:43 AM        START taking these medications    Details   metoclopramide (Reglan) 10 mg tablet Take 1 tablet (10 mg total) by mouth every 6 (six) hours as needed (nausea), Starting Wed 11/20/2024, Normal           CONTINUE these medications which have NOT CHANGED    Details   acetaminophen (TYLENOL) 325 mg tablet Take 2 tablets (650 mg total) by mouth every 4 (four) hours as needed for mild pain, Starting Thu 11/14/2024, No Print      alfuzosin (UROXATRAL) 10 mg 24 hr tablet Take 1 tablet (10 mg total) by mouth daily, Starting Mon 11/11/2024, Normal      docusate sodium (COLACE) 100 mg capsule Take 1 capsule (100 mg total) by mouth 2 (two) times a day for 15 days, Starting Thu 11/14/2024, Until Fri 11/29/2024, Normal      ibuprofen (MOTRIN) 600 mg tablet Take 1 tablet (600 mg total) by mouth every 8 (eight) hours as needed for moderate pain, Starting Thu 10/31/2024, Normal      naproxen (NAPROSYN) 500 mg tablet Take 1 tablet (500 mg total) by mouth 2 (two) times a day with meals for 5 days For pain, Starting Thu 11/14/2024, Until Tue 11/19/2024, Normal      ondansetron (ZOFRAN) 4 mg tablet Take 1 tablet (4 mg total) by mouth 2 (two) times a day as needed for nausea or vomiting, Starting Mon 11/11/2024, Normal      oxybutynin (DITROPAN) 5 mg tablet Take 1 tablet (5 mg total) by mouth every 6 (six) hours as needed (bladder spasms), Starting Thu 11/14/2024, Normal      Zepbound 10 MG/0.5ML auto-injector Inject 0.5 mL (10 mg total) under the skin once a week for 28 days Do not start before December 26, 2024., Starting Thu 12/26/2024, Until Thu 1/23/2025, Normal      Zepbound 12.5 MG/0.5ML auto-injector Inject 0.5 mL (12.5 mg total) under the skin once a week for 28 days Do not start before January 23, 2025., Starting Thu 1/23/2025, Until Thu 2/20/2025, Normal      Zepbound 15 MG/0.5ML auto-injector  Inject 0.5 mL (15 mg total) under the skin once a week Do not start before February 20, 2025., Starting Thu 2/20/2025, Normal      Zepbound 2.5 MG/0.5ML auto-injector INJECT 2.5MG SUBCUTANEOUSLY (UNDER THE SKIN) ONCE WEEKLY, Normal      Zepbound 5 MG/0.5ML auto-injector INJECT 5MG SUBCUTANEOUSLY (UNDER THE SKIN) ONCE WEEKLY, Normal      Zepbound 7.5 MG/0.5ML auto-injector INJECT 7.5MG SUBCUTANEOUSLY (UNDER THE SKIN) ONCE WEEKLY, Starting Tue 11/19/2024, Normal           STOP taking these medications       oxyCODONE (ROXICODONE) 5 immediate release tablet Comments:   Reason for Stopping:             No discharge procedures on file.  ED SEPSIS DOCUMENTATION   Time reflects when diagnosis was documented in both MDM as applicable and the Disposition within this note       Time User Action Codes Description Comment    11/20/2024 11:40 AM Gabrielle Garcia Add [R11.0] Nausea                  Gabrielle Garcia MD  11/22/24 0811

## 2024-12-05 ENCOUNTER — TELEPHONE (OUTPATIENT)
Age: 49
End: 2024-12-05

## 2024-12-05 ENCOUNTER — TELEMEDICINE (OUTPATIENT)
Dept: FAMILY MEDICINE CLINIC | Facility: CLINIC | Age: 49
End: 2024-12-05
Payer: COMMERCIAL

## 2024-12-05 VITALS — HEIGHT: 62 IN | BODY MASS INDEX: 29.31 KG/M2

## 2024-12-05 DIAGNOSIS — E66.811 CLASS 1 OBESITY WITHOUT SERIOUS COMORBIDITY WITH BODY MASS INDEX (BMI) OF 32.0 TO 32.9 IN ADULT, UNSPECIFIED OBESITY TYPE: ICD-10-CM

## 2024-12-05 PROCEDURE — 99213 OFFICE O/P EST LOW 20 MIN: CPT | Performed by: INTERNAL MEDICINE

## 2024-12-05 RX ORDER — TIRZEPATIDE 2.5 MG/.5ML
2.5 INJECTION, SOLUTION SUBCUTANEOUS WEEKLY
Qty: 2 ML | Refills: 3 | Status: SHIPPED | OUTPATIENT
Start: 2024-12-05 | End: 2024-12-05

## 2024-12-05 RX ORDER — TIRZEPATIDE 2.5 MG/.5ML
2.5 INJECTION, SOLUTION SUBCUTANEOUS WEEKLY
Qty: 2 ML | Refills: 3 | Status: SHIPPED | OUTPATIENT
Start: 2024-12-05

## 2024-12-05 NOTE — PROGRESS NOTES
Virtual Regular Visit  Name: Lolis Huber      : 1975      MRN: [id number]  Encounter Provider: Marsha Green MD  Encounter Date: 2024   Encounter department: [facility] MEDICINE      Verification of patient location:  Patient is located at Home in the following state in which I hold an active license [place] :  Assessment & Plan  Class 1 obesity without serious comorbidity with body mass index (BMI) of 32.0 to 32.9 in adult, unspecified obesity type  Lolis started 7.5 mg dosage on Monday but is not tolerating it secondary to severe n/v/weakness, despite  zofran. She wants to go back down to the 2.5 mg dose, which I did send for her-told her I wasn't sure if the insurance would cover it this way, but we'll try.  Advised hydration. Hopefully, she has a decent diet and exercise program establsished  Orders:    tirzepatide (Zepbound) 2.5 mg/0.5 mL auto-injector; Inject 0.5 mL (2.5 mg total) under the skin once a week    Class 1 obesity without serious comorbidity with body mass index (BMI) of 32.0 to 32.9 in adult, unspecified obesity type               Encounter provider Marsha Green MD    The patient was identified by name and date of birth. Lolis Huber was informed that this is a telemedicine visit and that the visit is being conducted through the Epic Embedded platform. She agrees to proceed..  My office door was closed. No one else was in the room.  She acknowledged consent and understanding of privacy and security of the video platform. The patient has agreed to participate and understands they can discontinue the visit at any time.    Patient is aware this is a billable service.     History of Present Illness     Lolis called in to touch base on her use of Zepbound, weight loss med-she titrated up to 7.5 mg on Monday and took it and is having a terrible time with nausea, not being able to eat-she wants to go back down to the 2.5 mg weekly-even with zofran she isn't  "tolerating med      Review of Systems   Constitutional:  Positive for appetite change.   HENT: Negative.     Gastrointestinal:  Positive for nausea and vomiting.   Musculoskeletal: Negative.    Skin: Negative.    Psychiatric/Behavioral: Negative.         Objective   Ht 5' 2\" (1.575 m)   LMP 02/15/2023   BMI 29.31 kg/m²     Physical Exam  Constitutional:       Appearance: Normal appearance.   HENT:      Head: Normocephalic and atraumatic.      Right Ear: External ear normal.      Left Ear: External ear normal.      Nose: Nose normal.   Eyes:      Extraocular Movements: Extraocular movements intact.   Pulmonary:      Effort: Pulmonary effort is normal.   Neurological:      General: No focal deficit present.      Mental Status: She is alert.   Psychiatric:         Mood and Affect: Mood normal.         Thought Content: Thought content normal.         Judgment: Judgment normal.         Visit Time  Total Visit Duration: 20 min  "

## 2024-12-05 NOTE — TELEPHONE ENCOUNTER
I just made a virtual appt. For this pt. For 9:15 with Dr. Green and she will do it through her [employer].

## 2024-12-05 NOTE — ASSESSMENT & PLAN NOTE
Lolis started 7.5 mg dosage on Monday but is not tolerating it secondary to severe n/v/weakness, despite  zofran. She wants to go back down to the 2.5 mg dose, which I did send for her-told her I wasn't sure if the insurance would cover it this way, but we'll try.  Advised hydration. Hopefully, she has a decent diet and exercise program establsished  Orders:    tirzepatide (Zepbound) 2.5 mg/0.5 mL auto-injector; Inject 0.5 mL (2.5 mg total) under the skin once a week

## 2024-12-19 ENCOUNTER — HOSPITAL ENCOUNTER (OUTPATIENT)
Dept: ULTRASOUND IMAGING | Facility: HOSPITAL | Age: 49
Discharge: HOME | End: 2024-12-19
Payer: COMMERCIAL

## 2024-12-19 DIAGNOSIS — N20.0 NEPHROLITHIASIS: ICD-10-CM

## 2024-12-19 PROCEDURE — 76770 US EXAM ABDO BACK WALL COMP: CPT

## 2024-12-30 ENCOUNTER — RESULTS FOLLOW-UP (OUTPATIENT)
Dept: UROLOGY | Facility: CLINIC | Age: 49
End: 2024-12-30

## 2024-12-30 ENCOUNTER — TELEPHONE (OUTPATIENT)
Age: 49
End: 2024-12-30

## 2024-12-30 NOTE — TELEPHONE ENCOUNTER
Pt called and stated she is on the schedule for a follow up appointment for today but read via  [employer] that her imaging came back normal showing no issues. Pt questioning if today's appointment is truly necessary. Please advise.     Call back: [phone]

## 2025-01-21 ENCOUNTER — TELEPHONE (OUTPATIENT)
Age: 50
End: 2025-01-21

## 2025-01-21 DIAGNOSIS — Z76.0 MEDICATION REFILL: Primary | ICD-10-CM

## 2025-01-21 RX ORDER — TIRZEPATIDE 5 MG/.5ML
5 INJECTION, SOLUTION SUBCUTANEOUS WEEKLY
Qty: 2 ML | Refills: 0 | Status: SHIPPED | OUTPATIENT
Start: 2025-01-21

## 2025-01-21 NOTE — TELEPHONE ENCOUNTER
Patient called to let Dr. Green know that since she has been on the 2.5mg zepbound for the last 2 months she has seen no change.  Patient stated she would like to increase to the 5mg if Dr. Green is agreeable to this increase.    Please call patient if increase is oked and sent to pharmacy.    [facility] in [place]

## 2025-01-23 ENCOUNTER — TELEPHONE (OUTPATIENT)
Age: 50
End: 2025-01-23

## 2025-01-23 NOTE — TELEPHONE ENCOUNTER
PA for ZEPBOUND 5MG SUBMITTED to [employer]    via    [x]CMM-KEY: [id number]    [x]PA sent as URGENT    All office notes, labs and other pertaining documents and studies sent. Clinical questions answered. Awaiting determination from insurance company.     Turnaround time for your insurance to make a decision on your Prior Authorization can take 7-21 business days.

## 2025-02-17 ENCOUNTER — OFFICE VISIT (OUTPATIENT)
Age: 50
End: 2025-02-17
Payer: COMMERCIAL

## 2025-02-17 VITALS — TEMPERATURE: 97.9 F | BODY MASS INDEX: 26.89 KG/M2 | WEIGHT: 147 LBS

## 2025-02-17 DIAGNOSIS — D23.9 DERMATOFIBROMA: ICD-10-CM

## 2025-02-17 DIAGNOSIS — L91.0 KELOID SCAR: Primary | ICD-10-CM

## 2025-02-17 PROCEDURE — 11900 INJECT SKIN LESIONS </W 7: CPT | Performed by: DERMATOLOGY

## 2025-02-17 PROCEDURE — 99203 OFFICE O/P NEW LOW 30 MIN: CPT | Performed by: DERMATOLOGY

## 2025-02-17 RX ORDER — TRIAMCINOLONE ACETONIDE 40 MG/ML
40 INJECTION, SUSPENSION INTRA-ARTICULAR; INTRAMUSCULAR ONCE
Status: COMPLETED | OUTPATIENT
Start: 2025-02-17 | End: 2025-02-17

## 2025-02-17 RX ADMIN — TRIAMCINOLONE ACETONIDE 40 MG: 40 INJECTION, SUSPENSION INTRA-ARTICULAR; INTRAMUSCULAR at 12:56

## 2025-02-17 NOTE — PATIENT INSTRUCTIONS
KELOID SCAR    Assessment and Plan:  Based on a thorough discussion of this condition and the management approach to it (including a comprehensive discussion of the known risks, side effects and potential benefits of treatment), the patient (family) agrees to implement the following specific plan:  Discussed steroid injections  Discussed laser treatments  Discussed surgical removal with radiation   Kenalog injection done today in office: consent signed   Follow up in 8 weeks  Ambulatory referral to Dr. Olivares for laser treatments    A keloid scar is a firm, smooth, hard growth due to spontaneous scar formation. It can arise soon after an injury, or develop months later. Keloids may be uncomfortable or itchy and extend well beyond the original wound. They may form on any part of the body, although the upper chest and shoulders are especially prone to them.    The precise reason that wound healing sometimes leads to keloid formation is under investigation but is not yet clear.  While most people never form keloids, others develop them after minor injuries, burns, insect bites and acne spots. Dark skinned people form keloids more easily than Caucasians. A keloid is harmless to general health and does not change into skin cancer.    The following measures are helpful in at least some patients.  Emollients (creams and oils)  Polyurethane or silicone scar reduction patches  Silicone gel  Oral or topical tranilast (an inhibitor of collagen synthesis)  Pressure dressings  Surgical excision (but in keloids, excision may result in a new keloid even larger than the original one)  Intralesional corticosteroid injection, repeated every few weeks  Intralesional 5-fluorouracil  Cryotherapy  Superficial X-ray treatment soon after surgery.  Pulsed dye laser   Skin needling  Subcision    Scar dressings should be worn for 12-24 hours per day, for at least 8 to 12 weeks, and perhaps for much longer.    PROCEDURE:  INTRALESIONAL  "STEROID INJECTION (KENALOG INJECTION)    Purpose: Triamcinolone is a synthetic glucocorticoid corticosteroid that has marked anti-inflammatory action. It is prepared in sterile aqueous suspension suitable for injecting directly into a lesion on or immediately below the skin to treat a dermal inflammatory process.     Indications: It is indicated for alopecia areata; inflammatory acne cysts; discoid lupus erythematosus; keloids and hypertrophic scars; inflammatory lesions of granuloma annulare, lichen planus, lichen simplex chronicus (neurodermatitis), psoriatic plaques, and other localized inflammatory skin conditions.     Potential Side Effects: I understand that triamcinolone injection can potentially cause early and/or delayed adverse effects such as:    Pain    Impaired wound healing    Increased hair growth    Bleeding    White or brown marks    Steroid acne    Infection    Telangiectasia    Skin thinning    Cutaneous and subcutaneous lipoatrophy (most common) appearing as skin indentations or dimples around the injection sites a few weeks after treatment     DERMATOFIBROMA      Assessment and Plan:  Based on a thorough discussion of this condition and the management approach to it (including a comprehensive discussion of the known risks, side effects and potential benefits of treatment), the patient (family) agrees to implement the following specific plan:  Reassured benign    Assessment and Plan:  A dermatofibroma is a common benign fibrous nodule that most often arises on the skin of the lower legs.  A dermatofibroma is also called a \"cutaneous fibrous histiocytoma.\"  Dermatofibromas occur at all ages and in people of every ethnicity. They are more common in women than in men.    It is not clear if dermatofibroma is a reactive process or if it is a neoplasm. The lesions are made up of proliferating fibroblasts. Histiocytes may also be involved.  They are sometimes attributed to an insect bite or ingrownhair " "or local trauma, but not consistently. They may be more numerous in patients with altered immunity.    Dermatofibromas most often occur on the legs and arms, but may also arise on the trunk or any site of the body.  Typical clinical features include the following:  People may have 1 or up to 15 lesions.  Size varies from 0.5-1.5 cm diameter; most lesions are 7-10 mm diameter.  They are firm nodules tethered to the skin surface and mobile over subcutaneous tissue.  The skin \"dimples\" on pinching the lesion.  Color may be pink to light brown in white skin, and dark brown to black in dark skin; some appear paler in the center.  They do not usually cause symptoms, but they are sometimes painful or itchy.  Because they are often raised lesions, they may be traumatized, for example by a razor.  Occasionally dozens may erupt within a few months, usually in the setting of immunosuppression (for example autoimmune disease, cancer or certain medications).  Dermatofibroma does not give rise to cancer. However, occasionally, it may be mistaken for dermatofibrosarcoma or desmoplastic melanoma.    A dermatofibroma is harmless and seldom causes any symptoms. Usually, only reassurance is needed. If it is nuisance or causing concern, the lesion can be removed surgically, resulting in a scar that is, by definition, usually longer in diameter than the widest portion of the dermatofibroma.  Cryotherapy, shave biopsy and laser surgery are rarely completely successful.  Skin punch biopsy or incisional biopsy may be undertaken if there is an atypical feature such as recent enlargement, ulceration, or asymmetrical structures and colours on dermatoscopy.     "
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
## 2025-02-18 ENCOUNTER — NURSE TRIAGE (OUTPATIENT)
Age: 50
End: 2025-02-18

## 2025-02-18 ENCOUNTER — TELEPHONE (OUTPATIENT)
Age: 50
End: 2025-02-18

## 2025-02-18 DIAGNOSIS — K64.9 HEMORRHOIDS, UNSPECIFIED HEMORRHOID TYPE: ICD-10-CM

## 2025-02-18 DIAGNOSIS — K62.5 RECTAL BLEEDING: Primary | ICD-10-CM

## 2025-02-18 NOTE — TELEPHONE ENCOUNTER
"Last OV 24 Dr. Tapia Rectal bleeding, Iron deficiency anemia   Colonoscopy EGD 5/3/24  Next OV 25    Pt reports painful BM for 2 weeks. Has external hemorrhoids. Using sitz baths, Tucks, and  Prep H wipes. Also using  Hydrocortisone USPBRBPR x 2. States last occured Saturday and had blood rumming down leg.  States she can no longer eat spicy foods as BM cause burning. States she has felt weak past couple of weeks. Pt eating high fiber diet, hydrating, fruits yogurt cannot eat spicy food. Stools are  \"not hard\" and brown.     Denies abdominal pain, nausea, vomiting, fever, dark tarry stools.    Pt advised to try stool softener and Anusol 2.5%  BID x 10 days. Pt calling PCP for updated labs. Pt uses [facility].    Pt questioning need for urgent appt. Prefers to see Dr. Tapia. Please advise.      Reason for Disposition   Affirmative: The patient has BRBPR (bright red blood per rectum) with wiping, not mixed with stool, AND feels a hemorrhoid    Answer Assessment - Initial Assessment Questions  1. When did your symptoms start?   2 weeks  2. Is there bright red blood when wiping or streaks in the stool? If yes, how many occurrences have you had in the last 24 hours?  Yes, Saturday  3. Do you feel this is a mild, moderate, or severe amount of blood?   moderate  4. Have your symptoms improved or weakened? No bleeding; pain the same  5. Are you experiencing more diarrhea or constipation?   denies  6. Do you have anything prescribed or over the counter for this? XXX If so, did they offer any relief?   Prep H and Hydrocortisone  7. Are you experiencing any additional symptoms? If yes, please describe what your symptoms are.   Burning    Protocols used: GI-Hemorrhoids-ADULT-[place]    "

## 2025-02-18 NOTE — TELEPHONE ENCOUNTER
Spoke with patient and reviewed recommendations from VANNA CARRILLO, patient verbalized understanding.

## 2025-02-18 NOTE — TELEPHONE ENCOUNTER
PT scheduled with Dr Green for a physical on 3/12, and is inquiring if any labs would be needed to complete.     Please advise    ThankYou

## 2025-02-21 ENCOUNTER — TELEPHONE (OUTPATIENT)
Age: 50
End: 2025-02-21

## 2025-02-21 DIAGNOSIS — Z00.00 ANNUAL PHYSICAL EXAM: Primary | ICD-10-CM

## 2025-02-21 NOTE — TELEPHONE ENCOUNTER
"Pt calling to check on availability with Dr Kenny for keloid scar consult    Pt saw Dr Vizcaino on 2/17, and she says there was discussion of pt seeing Dr Kenny next time. Apparently Dr Vizcaino was supposed to talk to Dr Kenny about \"squeezing pt in\" before her actual \"next avail\" FU slot. Pt was expecting a call from us about that, but never heard anything.    Advised pt I have no record of such discussion in her chart, but I can schedule her into next avail.    Dr Vizcaino's notes mention referral to Dr MELGOZA for laser treatment.    Pt currently has 8 week FU wDr S on 4/14, and I scheduled her w/Dr JUNIOR on 9/18.    Pt wants to know can Dr Kenny see her before then?  "

## 2025-02-24 DIAGNOSIS — Z76.0 MEDICATION REFILL: ICD-10-CM

## 2025-02-24 DIAGNOSIS — E66.811 CLASS 1 OBESITY WITHOUT SERIOUS COMORBIDITY WITH BODY MASS INDEX (BMI) OF 32.0 TO 32.9 IN ADULT, UNSPECIFIED OBESITY TYPE: ICD-10-CM

## 2025-02-24 NOTE — PROGRESS NOTES
Name: Lolis Huber      : 1975      MRN: [id number]  Encounter Provider: Inessa Parada MD  Encounter Date: 2025   Encounter department: [facility] [place]  :  Assessment & Plan  Rectal bleeding  See below  Orders:    Ambulatory Referral to Colorectal Surgery    Hemorrhoids, unspecified hemorrhoid type  Patient does have moderate-sized external hemorrhoidal skin tags, which are likely inflamed secondary to fissure  If hemorrhoids continue to bother her after resolution of fissure symptoms, she would be a candidate for excisional hemorrhoidectomy  Orders:    Ambulatory Referral to Colorectal Surgery    Anal fissure  Patient has exquisite posterior midline tenderness consistent with anal fissure  We discussed use of topical nifedipine 3 times a day for 2 months to aid in fissure healing  She will follow-up in 2 months to assess symptoms  Orders:    NIFEdipine 0.3%-lidocaine 5% rectal ointment; Apply 1 Application topically 3 (three) times a day Apply a small amount to anal fissure three times a day for two months           History of Present Illness   HPI    Lolis Huber presents referred by LORENA Fried for rectal bleeding; hemorrhoids.     The patient notes anal burning on a daily basis, and an episode of rectal bleeding a couple of week ago, with bleeding going down her her upon getting up from the toilet sit.. Notes episodes of bleeding in the past but never this severe.     Last colonoscopy performed on 5/3/2024 by Dr. Tapia, with a 7 year recall.     Review of Systems   Constitutional:  Negative for chills and fever.   HENT:  Negative for ear pain and sore throat.    Eyes:  Negative for pain and visual disturbance.   Respiratory:  Negative for cough and shortness of breath.    Cardiovascular:  Negative for chest pain and palpitations.   Gastrointestinal:  Positive for anal bleeding and rectal pain. Negative for abdominal pain and vomiting.  "  Genitourinary:  Negative for dysuria and hematuria.   Musculoskeletal:  Negative for arthralgias and back pain.   Skin:  Negative for color change and rash.   Neurological:  Negative for seizures and syncope.   All other systems reviewed and are negative.      Objective   Ht 5' 2\" (1.575 m)   Wt 65.3 kg (144 lb)   LMP 02/15/2023   BMI 26.34 kg/m²      Physical Exam  Vitals and nursing note reviewed.   Constitutional:       General: She is not in acute distress.     Appearance: She is well-developed.   HENT:      Head: Normocephalic and atraumatic.   Eyes:      Conjunctiva/sclera: Conjunctivae normal.   Cardiovascular:      Rate and Rhythm: Normal rate and regular rhythm.      Heart sounds: No murmur heard.  Pulmonary:      Effort: Pulmonary effort is normal. No respiratory distress.      Breath sounds: Normal breath sounds.   Abdominal:      Palpations: Abdomen is soft.      Tenderness: There is no abdominal tenderness.   Genitourinary:     Comments: Moderate external hemorrhoidal skin tags  Strong tone on digital anorectal exam with exquisite posterior midline tenderness consistent with anal fissure  Musculoskeletal:         General: No swelling.      Cervical back: Neck supple.   Skin:     General: Skin is warm and dry.      Capillary Refill: Capillary refill takes less than 2 seconds.   Neurological:      Mental Status: She is alert.   Psychiatric:         Mood and Affect: Mood normal.       Administrative Statements   I have spent a total time of 34 minutes in caring for this patient on the day of the visit/encounter including Diagnostic results, Prognosis, Risks and benefits of tx options, Instructions for management, Patient and family education, Importance of tx compliance, Risk factor reductions, Impressions, Counseling / Coordination of care, Documenting in the medical record, Reviewing/placing orders in the medical record (including tests, medications, and/or procedures), Obtaining or reviewing " history  , and Communicating with other healthcare professionals .

## 2025-02-24 NOTE — TELEPHONE ENCOUNTER
Lolis state she is taking her last dose of Zepbound today.   Would like refill to be sent to [facility].   Please advise  Thank you  Requested Prescriptions     Pending Prescriptions Disp Refills    tirzepatide (Zepbound) 2.5 mg/0.5 mL auto-injector 2 mL 3     Sig: Inject 0.5 mL (2.5 mg total) under the skin once a week    tirzepatide (Zepbound) 5 mg/0.5 mL auto-injector 2 mL 0     Sig: Inject 0.5 mL (5 mg total) under the skin once a week

## 2025-02-25 ENCOUNTER — OFFICE VISIT (OUTPATIENT)
Age: 50
End: 2025-02-25
Payer: COMMERCIAL

## 2025-02-25 VITALS — WEIGHT: 144 LBS | HEIGHT: 62 IN | BODY MASS INDEX: 26.5 KG/M2

## 2025-02-25 DIAGNOSIS — K62.5 RECTAL BLEEDING: ICD-10-CM

## 2025-02-25 DIAGNOSIS — K64.9 HEMORRHOIDS, UNSPECIFIED HEMORRHOID TYPE: ICD-10-CM

## 2025-02-25 DIAGNOSIS — K60.2 ANAL FISSURE: Primary | ICD-10-CM

## 2025-02-25 PROCEDURE — 99203 OFFICE O/P NEW LOW 30 MIN: CPT | Performed by: SURGERY

## 2025-02-25 RX ORDER — TIRZEPATIDE 2.5 MG/.5ML
2.5 INJECTION, SOLUTION SUBCUTANEOUS WEEKLY
Qty: 2 ML | Refills: 3 | Status: SHIPPED | OUTPATIENT
Start: 2025-02-25

## 2025-02-25 RX ORDER — TIRZEPATIDE 5 MG/.5ML
5 INJECTION, SOLUTION SUBCUTANEOUS WEEKLY
Qty: 2 ML | Refills: 0 | Status: SHIPPED | OUTPATIENT
Start: 2025-02-25

## 2025-02-25 RX ORDER — TIRZEPATIDE 5 MG/.5ML
INJECTION, SOLUTION SUBCUTANEOUS
Qty: 2 ML | Refills: 0 | Status: SHIPPED | OUTPATIENT
Start: 2025-02-25

## 2025-02-25 NOTE — PATIENT INSTRUCTIONS
Apply topical nifedipine (compounded ointment) around the anus three times a day for two months.    High fiber diet/increased hydration, 20-30 grams fiber per day, increased fruits/vegetables    Start fiber supplementation with benefiber. You may put this in your coffee/tea/juice in the morning. Start with 2 tsp once per day. If you experience bloating or gassiness, you may start with 1 tsp once per day. You may increase fiber supplementation to UP TO 2 tsp three times per day in order to achieve 1 formed bowel movement once per day.    Aim to drink at least 64 oz of water per day      High Fiber Diet   AMBULATORY CARE:   A high-fiber diet  includes foods that have a high amount of fiber. Fiber is the part of fruits, vegetables, and grains that is not broken down by your body. Fiber keeps your bowel movements regular. Fiber can also help lower your cholesterol level, control blood sugar in people with diabetes, and relieve constipation. Fiber can also help you control your weight because it helps you feel full faster. Most adults should eat 25 to 35 grams of fiber each day. Talk to your dietitian or healthcare provider about the amount of fiber you need.  Good sources of fiber:       Foods with at least 4 grams of fiber per serving:      ? to ½ cup of high-fiber cereal (check the nutrition label on the box)    ½ cup of blackberries or raspberries    4 dried prunes    1 cooked artichoke    ½ cup of cooked legumes, such as lentils, or red, kidney, and leiva beans    Foods with 1 to 3 grams of fiber per servin slice of whole-wheat, pumpernickel, or rye bread    ½ cup of cooked brown rice    4 whole-wheat crackers    1 cup of oatmeal    ½ cup of cereal with 1 to 3 grams of fiber per serving (check the nutrition label on the box)    1 small piece of fruit, such as an apple, banana, pear, kiwi, or orange    3 dates    ½ cup of canned apricots, fruit cocktail, peaches, or pears    ½ cup of raw or cooked  vegetables, such as carrots, cauliflower, cabbage, spinach, squash, or corn  Ways that you can increase fiber in your diet:   Choose brown or wild rice instead of white rice.     Use whole wheat flour in recipes instead of white or all-purpose flour.     Add beans and peas to casseroles or soups.     Choose fresh fruit and vegetables with peels or skins on instead of juices.    Other diet guidelines to follow:   Add fiber to your diet slowly.  You may have abdominal discomfort, bloating, and gas if you add fiber to your diet too quickly.     Drink plenty of liquids as you add fiber to your diet.  You may have nausea or develop constipation if you do not drink enough water. Ask how much liquid to drink each day and which liquids are best for you.    © Copyright [employer] 2023 Information is for End User's use only and may not be sold, redistributed or otherwise used for commercial purposes.  The above information is an  only. It is not intended as medical advice for individual conditions or treatments. Talk to your doctor, nurse or pharmacist before following any medical regimen to see if it is safe and effective for you.

## 2025-02-25 NOTE — TELEPHONE ENCOUNTER
I mentioned to the patient that I would talk with Dr Kenny about excision of the keloid followed  with XRT or if she preferred she could follow up with Dr Kenny.  At the time of the appoinment, the patient and I agreed to see how the injection worked and refer for laser.  Perhaps she changed her mind.  It may also be less of a drive for her.  Maybe she wants something closer?

## 2025-02-26 ENCOUNTER — TELEPHONE (OUTPATIENT)
Age: 50
End: 2025-02-26

## 2025-02-26 NOTE — TELEPHONE ENCOUNTER
Patient called to see if she needed to fast for upcoming labs ordered, Lipid panel ordered, fasting 10-12 hours.  Patient advised and understood.

## 2025-03-05 ENCOUNTER — NURSE TRIAGE (OUTPATIENT)
Age: 50
End: 2025-03-05

## 2025-03-05 NOTE — TELEPHONE ENCOUNTER
"FOLLOW UP: Office visit 3/13    REASON FOR CONVERSATION: Vaginal Bleeding    SYMPTOMS: vaginal bleeding after 2 years with no menses    OTHER: Denies heavy bleeding. Bleeding precautions reviewed, patient verbalized understanding.     DISPOSITION: See Within 2 Weeks in Office      Reason for Disposition   Postmenopausal vaginal bleeding    Answer Assessment - Initial Assessment Questions  1. BLEEDING SEVERITY: \"Describe the bleeding that you are having.\" \"How much bleeding is there?\"       Started as light vaginal bleeding 2/28 which has progressed into normal menstrual flow now  2. ONSET: \"When did the bleeding begin?\" \"Is it continuing now?\"      2/28, yes continuing now  3. MENOPAUSE: \"When was your last menstrual period?\"       2 years ago  4. ABDOMEN PAIN: \"Do you have any pain?\" \"How bad is the pain?\"  (e.g., Scale 0-10; none, mild, moderate, or severe)      Mild abdominal cramping  5. BLOOD THINNERS: \"Do you take any blood thinners?\" (e.g., Coumadin/warfarin, Pradaxa/dabigatran, aspirin)      denies  6. HORMONE MEDICINES: \"Are you taking any hormone medicines, prescription or OTC?\" (e.g., birth control pills, estrogen)      denies  7. CAUSE: \"What do you think is causing the bleeding?\" (e.g., recent gyn surgery, recent gyn procedure; known bleeding disorder, uterine cancer)        unknown  8. HEMODYNAMIC STATUS: \"Are you weak or feeling lightheaded?\" If Yes, ask: \"Can you stand and walk normally?\"        denies  9. OTHER SYMPTOMS: \"What other symptoms are you having with the bleeding?\" (e.g., back pain, burning with urination, fever)      denies    Protocols used: Vaginal Bleeding - Postmenopausal-Adult-[place]    "

## 2025-03-06 ENCOUNTER — RA CDI HCC (OUTPATIENT)
Dept: OTHER | Facility: HOSPITAL | Age: 50
End: 2025-03-06

## 2025-03-10 ENCOUNTER — RESULTS FOLLOW-UP (OUTPATIENT)
Dept: FAMILY MEDICINE CLINIC | Facility: CLINIC | Age: 50
End: 2025-03-10

## 2025-03-10 ENCOUNTER — APPOINTMENT (OUTPATIENT)
Dept: LAB | Facility: CLINIC | Age: 50
End: 2025-03-10
Payer: COMMERCIAL

## 2025-03-10 DIAGNOSIS — Z00.00 ANNUAL PHYSICAL EXAM: ICD-10-CM

## 2025-03-10 LAB
ALBUMIN SERPL BCG-MCNC: 4 G/DL (ref 3.5–5)
ALP SERPL-CCNC: 68 U/L (ref 34–104)
ALT SERPL W P-5'-P-CCNC: 7 U/L (ref 7–52)
ANION GAP SERPL CALCULATED.3IONS-SCNC: 8 MMOL/L (ref 4–13)
AST SERPL W P-5'-P-CCNC: 14 U/L (ref 13–39)
BASOPHILS # BLD AUTO: 0.03 THOUSANDS/ÂΜL (ref 0–0.1)
BASOPHILS NFR BLD AUTO: 1 % (ref 0–1)
BILIRUB SERPL-MCNC: 0.45 MG/DL (ref 0.2–1)
BUN SERPL-MCNC: 11 MG/DL (ref 5–25)
CALCIUM SERPL-MCNC: 9.1 MG/DL (ref 8.4–10.2)
CHLORIDE SERPL-SCNC: 107 MMOL/L (ref 96–108)
CHOLEST SERPL-MCNC: 187 MG/DL (ref ?–200)
CO2 SERPL-SCNC: 26 MMOL/L (ref 21–32)
CREAT SERPL-MCNC: 0.57 MG/DL (ref 0.6–1.3)
EOSINOPHIL # BLD AUTO: 0.04 THOUSAND/ÂΜL (ref 0–0.61)
EOSINOPHIL NFR BLD AUTO: 1 % (ref 0–6)
ERYTHROCYTE [DISTWIDTH] IN BLOOD BY AUTOMATED COUNT: 12.8 % (ref 11.6–15.1)
GFR SERPL CREATININE-BSD FRML MDRD: 106 ML/MIN/1.73SQ M
GLUCOSE P FAST SERPL-MCNC: 82 MG/DL (ref 65–99)
HCT VFR BLD AUTO: 37 % (ref 34.8–46.1)
HDLC SERPL-MCNC: 55 MG/DL
HGB BLD-MCNC: 11.5 G/DL (ref 11.5–15.4)
IMM GRANULOCYTES # BLD AUTO: 0.01 THOUSAND/UL (ref 0–0.2)
IMM GRANULOCYTES NFR BLD AUTO: 0 % (ref 0–2)
LDLC SERPL CALC-MCNC: 118 MG/DL (ref 0–100)
LYMPHOCYTES # BLD AUTO: 2.19 THOUSANDS/ÂΜL (ref 0.6–4.47)
LYMPHOCYTES NFR BLD AUTO: 41 % (ref 14–44)
MCH RBC QN AUTO: 28.2 PG (ref 26.8–34.3)
MCHC RBC AUTO-ENTMCNC: 31.1 G/DL (ref 31.4–37.4)
MCV RBC AUTO: 91 FL (ref 82–98)
MONOCYTES # BLD AUTO: 0.37 THOUSAND/ÂΜL (ref 0.17–1.22)
MONOCYTES NFR BLD AUTO: 7 % (ref 4–12)
NEUTROPHILS # BLD AUTO: 2.7 THOUSANDS/ÂΜL (ref 1.85–7.62)
NEUTS SEG NFR BLD AUTO: 50 % (ref 43–75)
NONHDLC SERPL-MCNC: 132 MG/DL
NRBC BLD AUTO-RTO: 0 /100 WBCS
PLATELET # BLD AUTO: 270 THOUSANDS/UL (ref 149–390)
PMV BLD AUTO: 10 FL (ref 8.9–12.7)
POTASSIUM SERPL-SCNC: 3.6 MMOL/L (ref 3.5–5.3)
PROT SERPL-MCNC: 7.4 G/DL (ref 6.4–8.4)
RBC # BLD AUTO: 4.08 MILLION/UL (ref 3.81–5.12)
SODIUM SERPL-SCNC: 141 MMOL/L (ref 135–147)
TRIGL SERPL-MCNC: 69 MG/DL (ref ?–150)
TSH SERPL DL<=0.05 MIU/L-ACNC: 2 UIU/ML (ref 0.45–4.5)
WBC # BLD AUTO: 5.34 THOUSAND/UL (ref 4.31–10.16)

## 2025-03-10 PROCEDURE — 85025 COMPLETE CBC W/AUTO DIFF WBC: CPT

## 2025-03-10 PROCEDURE — 80061 LIPID PANEL: CPT

## 2025-03-10 PROCEDURE — 84443 ASSAY THYROID STIM HORMONE: CPT

## 2025-03-10 PROCEDURE — 80053 COMPREHEN METABOLIC PANEL: CPT

## 2025-03-10 PROCEDURE — 36415 COLL VENOUS BLD VENIPUNCTURE: CPT

## 2025-03-12 ENCOUNTER — OFFICE VISIT (OUTPATIENT)
Dept: FAMILY MEDICINE CLINIC | Facility: CLINIC | Age: 50
End: 2025-03-12
Payer: COMMERCIAL

## 2025-03-12 VITALS
SYSTOLIC BLOOD PRESSURE: 120 MMHG | BODY MASS INDEX: 25.95 KG/M2 | HEART RATE: 83 BPM | WEIGHT: 141 LBS | OXYGEN SATURATION: 97 % | HEIGHT: 62 IN | DIASTOLIC BLOOD PRESSURE: 84 MMHG

## 2025-03-12 DIAGNOSIS — D35.2 PITUITARY MICROADENOMA (HCC): ICD-10-CM

## 2025-03-12 DIAGNOSIS — D50.9 IRON DEFICIENCY ANEMIA, UNSPECIFIED IRON DEFICIENCY ANEMIA TYPE: ICD-10-CM

## 2025-03-12 DIAGNOSIS — Z00.00 ANNUAL PHYSICAL EXAM: Primary | ICD-10-CM

## 2025-03-12 DIAGNOSIS — Z12.11 SCREENING FOR COLON CANCER: ICD-10-CM

## 2025-03-12 DIAGNOSIS — Z12.31 ENCOUNTER FOR SCREENING MAMMOGRAM FOR BREAST CANCER: ICD-10-CM

## 2025-03-12 DIAGNOSIS — N20.0 NEPHROLITHIASIS: ICD-10-CM

## 2025-03-12 DIAGNOSIS — E66.9 OBESITY WITHOUT SERIOUS COMORBIDITY, UNSPECIFIED CLASS, UNSPECIFIED OBESITY TYPE: ICD-10-CM

## 2025-03-12 DIAGNOSIS — N93.9 VAGINAL BLEEDING: ICD-10-CM

## 2025-03-12 PROCEDURE — 99396 PREV VISIT EST AGE 40-64: CPT | Performed by: INTERNAL MEDICINE

## 2025-03-12 PROCEDURE — 99214 OFFICE O/P EST MOD 30 MIN: CPT | Performed by: INTERNAL MEDICINE

## 2025-03-12 NOTE — PROGRESS NOTES
Assessment/Plan:Lolis has been on Zepbound and has lost 30 pounds-was going to start going back to the gym, but, after 2 years, started vaginally bleeding again so she is going to see her GYN tomorrow to see what that's all about-had an episode of kidney stones recently too but better now-encouraged hydration-went over all of her recently done labwork with her which was normal, including an improved cholesterol panel         Problem List Items Addressed This Visit       Pituitary microadenoma (HCC)    Iron deficiency anemia    Obesity    Nephrolithiasis     Other Visit Diagnoses         Annual physical exam    -  Primary      Encounter for screening mammogram for breast cancer        HOlding off on mammogram for now      Screening for colon cancer        Had colonoscopy in 2024 and said to come back in 5 years      Vaginal bleeding                  Subjective:      Patient ID: Lolis Huber is a 50 y.o. female.    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), Hypokalemia (11/14/2024), Lateral epicondylitis of left elbow (01/07/2019), Nephrolithiasis (11/13/2024), New daily persistent headache (11/23/2020), Pre-operative clearance (12/13/2023), and Shoulder pain, right (01/18/2019).,  _______________________________________________________________________  Medical Problems:  does not have any pertinent problems on  file.,  _______________________________________________________________________  Past Surgical History:   has a past surgical history that includes Knee surgery; Gallbladder surgery;  section; Tubal ligation; Colonoscopy (2019); Cholecystectomy (2016); Colonoscopy (2016); Mammo (historical) (Bilateral, 2022); pr cysto/uretero w/lithotripsy &indwell stent insrt (Left, 2024); and FL retrograde pyelogram (2024).,  _______________________________________________________________________  Family History:  family history includes Arthritis in her mother; Cancer in her father; Heart disease in her father; Liver cancer in her maternal grandmother; Lupus in her mother; Melanoma in her father; No Known Problems in her brother, daughter, daughter, daughter, daughter, sister, sister, and son; Rheum arthritis in her mother.,  _______________________________________________________________________  Social History:   reports that she has never smoked. She has never used smokeless tobacco. She reports current alcohol use. She reports that she does not use drugs.,  _______________________________________________________________________  Allergies:  is allergic to bactrim [sulfamethoxazole-trimethoprim] and contrast dye [iodinated contrast media]..  _______________________________________________________________________  Current Outpatient Medications   Medication Sig Dispense Refill    naproxen (NAPROSYN) 500 mg tablet Take 1 tablet (500 mg total) by mouth 2 (two) times a day with meals for 5 days For pain 10 tablet 0    NIFEdipine 0.3%-lidocaine 5% rectal ointment Apply 1 Application topically 3 (three) times a day Apply a small amount to anal fissure three times a day for two months 30 g 2    tirzepatide (Zepbound) 5 mg/0.5 mL auto-injector Inject 0.5 mL (5 mg total) under the skin once a week 2 mL 0    Zepbound 5 MG/0.5ML auto-injector INJECT 5MG SUBCUTANEOUSLY (UNDER THE SKIN) ONCE  "WEEKLY 2 mL 0    ondansetron (ZOFRAN) 4 mg tablet Take 1 tablet (4 mg total) by mouth 2 (two) times a day as needed for nausea or vomiting (Patient not taking: Reported on 2/17/2025) 30 tablet 0    oxybutynin (DITROPAN) 5 mg tablet Take 1 tablet (5 mg total) by mouth every 6 (six) hours as needed (bladder spasms) (Patient not taking: Reported on 3/12/2025) 30 tablet 0     No current facility-administered medications for this visit.     _______________________________________________________________________  Review of Systems   Constitutional: Negative.    HENT: Negative.     Respiratory: Negative.     Cardiovascular: Negative.    Genitourinary:  Positive for menstrual problem and vaginal bleeding.   Neurological: Negative.    Hematological: Negative.    Psychiatric/Behavioral: Negative.           Objective:  Vitals:    03/12/25 1525   BP: 120/84   BP Location: Left arm   Patient Position: Sitting   Cuff Size: Standard   Pulse: 83   SpO2: 97%   Weight: 64 kg (141 lb)   Height: 5' 2\" (1.575 m)     Body mass index is 25.79 kg/m².     Physical Exam  Vitals and nursing note reviewed.   Constitutional:       Appearance: She is well-developed.   HENT:      Head: Normocephalic and atraumatic.      Right Ear: External ear normal.      Left Ear: External ear normal.      Nose: Nose normal.      Mouth/Throat:      Mouth: Mucous membranes are moist.   Eyes:      Conjunctiva/sclera: Conjunctivae normal.      Pupils: Pupils are equal, round, and reactive to light.   Neck:      Thyroid: No thyromegaly.   Cardiovascular:      Rate and Rhythm: Normal rate and regular rhythm.      Heart sounds: Normal heart sounds. No murmur heard.  Pulmonary:      Effort: Pulmonary effort is normal. No respiratory distress.      Breath sounds: Normal breath sounds.   Abdominal:      General: Bowel sounds are normal.      Palpations: Abdomen is soft.   Musculoskeletal:         General: Normal range of motion.      Cervical back: Normal range of " motion.   Lymphadenopathy:      Cervical: No cervical adenopathy.   Skin:     General: Skin is warm.      Findings: No rash.   Neurological:      General: No focal deficit present.      Mental Status: She is alert and oriented to person, place, and time.   Psychiatric:         Behavior: Behavior normal.         Thought Content: Thought content normal.         Judgment: Judgment normal.

## 2025-03-13 ENCOUNTER — PROCEDURE VISIT (OUTPATIENT)
Dept: OBGYN CLINIC | Facility: CLINIC | Age: 50
End: 2025-03-13
Payer: COMMERCIAL

## 2025-03-13 VITALS
SYSTOLIC BLOOD PRESSURE: 120 MMHG | HEIGHT: 62 IN | DIASTOLIC BLOOD PRESSURE: 80 MMHG | BODY MASS INDEX: 25.76 KG/M2 | WEIGHT: 140 LBS

## 2025-03-13 DIAGNOSIS — Z12.4 SCREENING FOR MALIGNANT NEOPLASM OF THE CERVIX: ICD-10-CM

## 2025-03-13 DIAGNOSIS — Z11.51 SCREENING FOR HPV (HUMAN PAPILLOMAVIRUS): ICD-10-CM

## 2025-03-13 DIAGNOSIS — N95.0 PMB (POSTMENOPAUSAL BLEEDING): Primary | ICD-10-CM

## 2025-03-13 PROCEDURE — 88305 TISSUE EXAM BY PATHOLOGIST: CPT | Performed by: SPECIALIST

## 2025-03-13 PROCEDURE — 58100 BIOPSY OF UTERUS LINING: CPT

## 2025-03-13 PROCEDURE — G0476 HPV COMBO ASSAY CA SCREEN: HCPCS

## 2025-03-13 PROCEDURE — G0145 SCR C/V CYTO,THINLAYER,RESCR: HCPCS

## 2025-03-13 PROCEDURE — 99203 OFFICE O/P NEW LOW 30 MIN: CPT

## 2025-03-13 NOTE — PROGRESS NOTES
- Patient to continue monitor bleeding/symptoms at the current time. EMB performed today and patient will obtain pelvic US. Patient to call if she has return of vaginal bleeding.     ASSESSMENT/PLAN:    Encounter Diagnosis     ICD-10-CM    1. PMB (postmenopausal bleeding)  N95.0 Tissue Exam     US pelvis complete w transvaginal     Endometrial biopsy      2. Screening for malignant neoplasm of the cervix  Z12.4 Liquid-based pap, screening     HPV High Risk      3. Screening for HPV (human papillomavirus)  Z11.51 Liquid-based pap, screening     HPV High Risk        SUBJECTIVE/HPI:      Patient ID: Lolis Huber [id number]      Lolis Huber is a 50 y.o.  presenting to the office with concern for postmenopausal bleeding. She started bleeding 2025 and had heavy bleeding for six days then it lightened for an additional 4 days. During this episode of vaginal bleeding, she experienced severe pelvic pain where she was unable to complete ADLs. She did not take ibuprofen/tylenol to help with pain but did use a heating pad which helped.   She has not had menses in >2 years since 47 y/o.   She had an endometrial polyp ~ten years ago.       Endometrial biopsy    Date/Time: 3/13/2025 9:00 AM    Performed by: Summer Retana PA-C  Authorized by: Summer Retana PA-C  Universal Protocol:  procedure performed by consultantConsent: Verbal consent obtained. Written consent obtained.  Risks and benefits: risks, benefits and alternatives were discussed  Consent given by: patient  Patient understanding: patient states understanding of the procedure being performed  Patient consent: the patient's understanding of the procedure matches consent given  Procedure consent: procedure consent matches procedure scheduled  Required items: required blood products, implants, devices, and special equipment available  Patient identity confirmed: verbally with patient    Indication:     Indications: Post-menopausal bleeding       Chronicity of post-menopausal bleeding:  New  Procedure:     Procedure: endometrial biopsy with Pipelle      A bivalve speculum was placed in the vagina: yes      Cervix cleaned and prepped: yes      The cervix was dilated: yes      Uterus sounded: yes      Uterus sound depth (cm):  7.5    Specimen collected: specimen collected and sent to pathology      Patient tolerated procedure well with no complications: yes    Findings:     Cervix: normal         HISTORY:    Patient Active Problem List   Diagnosis    Pituitary microadenoma (HCC)    Positive sm/RNP antibody    Iron deficiency anemia    Bilirubin in urine    Chronic left shoulder pain    Obesity    Nephrolithiasis       Allergies   Allergen Reactions    Bactrim [Sulfamethoxazole-Trimethoprim] Other (See Comments)     Suicidal    Contrast Dye [Iodinated Contrast Media] Rash         Current Outpatient Medications:     NIFEdipine 0.3%-lidocaine 5% rectal ointment, , Disp: , Rfl:     tirzepatide (Zepbound) 5 mg/0.5 mL auto-injector, Inject 0.5 mL (5 mg total) under the skin once a week, Disp: 2 mL, Rfl: 0    Zepbound 5 MG/0.5ML auto-injector, INJECT 5MG SUBCUTANEOUSLY (UNDER THE SKIN) ONCE WEEKLY, Disp: 2 mL, Rfl: 0    naproxen (NAPROSYN) 500 mg tablet, Take 1 tablet (500 mg total) by mouth 2 (two) times a day with meals for 5 days For pain, Disp: 10 tablet, Rfl: 0    ondansetron (ZOFRAN) 4 mg tablet, Take 1 tablet (4 mg total) by mouth 2 (two) times a day as needed for nausea or vomiting (Patient not taking: Reported on 2025), Disp: 30 tablet, Rfl: 0    oxybutynin (DITROPAN) 5 mg tablet, Take 1 tablet (5 mg total) by mouth every 6 (six) hours as needed (bladder spasms) (Patient not taking: Reported on 2025), Disp: 30 tablet, Rfl: 0    OB History          6    Para   6    Term   5       1    AB        Living   5         SAB        IAB        Ectopic        Multiple        Live Births   5           Obstetric Comments   Menarche 12          "      Past Medical History:   Diagnosis Date    Abnormal Pap smear of cervix     Alcohol intoxication (HCC) 2021    ED visit    Allergies     bactrim, IV contrast dye    Double vision 2020    Last assessed 2021    Enlarged pituitary gland (HCC)     Excessive crying of adult 2020    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 2020    Last assessed 2023    Hypokalemia 2024    Lateral epicondylitis of left elbow 2019    Last assessed 2019    Nephrolithiasis 2024    New daily persistent headache 2020    Last assessed 12/15/2020    Pre-operative clearance 2023    Shoulder pain, right 2019        Past Surgical History:   Procedure Laterality Date     SECTION      x 1    CHOLECYSTECTOMY  2016    COLONOSCOPY  2019    to be done in 5 years ()    COLONOSCOPY  2016    FL RETROGRADE PYELOGRAM  2024    GALLBLADDER SURGERY      KNEE SURGERY      MAMMO (HISTORICAL) Bilateral 2022    NORMAL    CT CYSTO/URETERO W/LITHOTRIPSY &INDWELL STENT INSRT Left 2024    Procedure: CYSTOSCOPY, LEFT URETEROSCOPY, LEFT RETROGRADE PYELOGRAM, BASKET STONE EXTRACTION, AND INSERTION STENT LEFT URETERAL;  Surgeon: James Yoo MD;  Location: [facility];  Service: Urology    TUBAL LIGATION          Family History   Problem Relation Age of Onset    Rheum arthritis Mother     Lupus Mother     Arthritis Mother         Rheumatoid    Cancer Father     Heart disease Father         bypass    Melanoma Father     Liver cancer Maternal Grandmother     No Known Problems Sister     No Known Problems Brother     No Known Problems Son     No Known Problems Daughter     No Known Problems Sister     No Known Problems Daughter     No Known Problems Daughter     No Known Problems Daughter         OBJECTIVE:    Visit Vitals  /80   Ht 5' 2\" (1.575 m)   Wt 63.5 kg (140 lb)   LMP 02/15/2023   BMI 25.61 kg/m²   OB Status Postmenopausal   Smoking " Status Never   BSA 1.64 m²         Physical Exam  Constitutional:       Appearance: Normal appearance.   Genitourinary:      Vulva and urethral meatus normal.      No vaginal discharge.        Right Adnexa: not tender and no mass present.     Left Adnexa: not tender and no mass present.     Cervix is parous.      No cervical friability, lesion or polyp.      Uterus is not enlarged or tender.   HENT:      Head: Normocephalic and atraumatic.   Neurological:      General: No focal deficit present.      Mental Status: She is alert and oriented to person, place, and time.   Psychiatric:         Mood and Affect: Mood normal.         Behavior: Behavior normal.   Vitals and nursing note reviewed.

## 2025-03-15 DIAGNOSIS — E66.9 OBESITY (BMI 30-39.9): Primary | ICD-10-CM

## 2025-03-17 ENCOUNTER — HOSPITAL ENCOUNTER (OUTPATIENT)
Dept: ULTRASOUND IMAGING | Facility: HOSPITAL | Age: 50
Discharge: HOME | End: 2025-03-17
Payer: COMMERCIAL

## 2025-03-17 ENCOUNTER — RESULTS FOLLOW-UP (OUTPATIENT)
Dept: OBGYN CLINIC | Facility: CLINIC | Age: 50
End: 2025-03-17

## 2025-03-17 DIAGNOSIS — N95.0 PMB (POSTMENOPAUSAL BLEEDING): ICD-10-CM

## 2025-03-17 PROCEDURE — 88305 TISSUE EXAM BY PATHOLOGIST: CPT | Performed by: SPECIALIST

## 2025-03-17 PROCEDURE — 76830 TRANSVAGINAL US NON-OB: CPT

## 2025-03-17 PROCEDURE — 76856 US EXAM PELVIC COMPLETE: CPT

## 2025-03-17 RX ORDER — TIRZEPATIDE 5 MG/.5ML
INJECTION, SOLUTION SUBCUTANEOUS
Qty: 2 ML | Refills: 0 | Status: SHIPPED | OUTPATIENT
Start: 2025-03-17

## 2025-03-19 LAB
LAB AP GYN PRIMARY INTERPRETATION: NORMAL
Lab: NORMAL

## 2025-03-24 ENCOUNTER — TELEPHONE (OUTPATIENT)
Age: 50
End: 2025-03-24

## 2025-03-28 ENCOUNTER — OFFICE VISIT (OUTPATIENT)
Dept: FAMILY MEDICINE CLINIC | Facility: CLINIC | Age: 50
End: 2025-03-28
Payer: COMMERCIAL

## 2025-03-28 VITALS
WEIGHT: 137 LBS | HEART RATE: 80 BPM | HEIGHT: 62 IN | SYSTOLIC BLOOD PRESSURE: 110 MMHG | BODY MASS INDEX: 25.21 KG/M2 | RESPIRATION RATE: 16 BRPM | DIASTOLIC BLOOD PRESSURE: 80 MMHG | OXYGEN SATURATION: 98 %

## 2025-03-28 DIAGNOSIS — B35.1 ONYCHOMYCOSIS: Primary | ICD-10-CM

## 2025-03-28 PROBLEM — M79.646 THUMB PAIN: Status: ACTIVE | Noted: 2025-03-28

## 2025-03-28 PROCEDURE — 99213 OFFICE O/P EST LOW 20 MIN: CPT | Performed by: FAMILY MEDICINE

## 2025-03-28 RX ORDER — TERBINAFINE HYDROCHLORIDE 250 MG/1
250 TABLET ORAL DAILY
Qty: 30 TABLET | Refills: 1 | Status: SHIPPED | OUTPATIENT
Start: 2025-03-28 | End: 2025-05-27

## 2025-03-28 NOTE — ASSESSMENT & PLAN NOTE
Patient has had it for past few weeks. Will start lamisil 250 mg daily for 2 months. LFTs normal in March 2025 and will recheck in 1 month.   Orders:  •  terbinafine (LamISIL) 250 mg tablet; Take 1 tablet (250 mg total) by mouth daily  •  Hepatic function panel; Future

## 2025-03-28 NOTE — PROGRESS NOTES
"Name: Lolis Huber      : 1975      MRN: [id number]  Encounter Provider: Galileo Sanchez MD  Encounter Date: 3/28/2025   Encounter department: [facility] MEDICINE  :  Assessment & Plan  Onychomycosis  Patient has had it for past few weeks. Will start lamisil 250 mg daily for 2 months. LFTs normal in 2025 and will recheck in 1 month.   Orders:  •  terbinafine (LamISIL) 250 mg tablet; Take 1 tablet (250 mg total) by mouth daily  •  Hepatic function panel; Future          Depression Screening and Follow-up Plan: Patient was screened for depression during today's encounter. They screened negative with a PHQ-2 score of 0.        History of Present Illness   Patient has had right thumbnail changes. Noticed it about 2 weeks ago when getting nails done. No known injury and no pain. Never had it before.       Review of Systems   Constitutional:  Negative for fatigue and unexpected weight change.   Respiratory:  Negative for cough, chest tightness and shortness of breath.    Cardiovascular:  Negative for chest pain and palpitations.   Gastrointestinal:  Negative for abdominal pain, constipation, diarrhea, nausea and vomiting.   Genitourinary:  Negative for difficulty urinating.   Musculoskeletal:  Negative for arthralgias.   Skin:  Negative for rash.        Right thumbnail changes   Neurological:  Negative for dizziness and headaches.       Objective   /80 (BP Location: Left arm, Patient Position: Sitting, Cuff Size: Standard)   Pulse 80   Resp 16   Ht 5' 2\" (1.575 m)   Wt 62.1 kg (137 lb)   LMP 02/15/2023   SpO2 98%   BMI 25.06 kg/m²      Physical Exam  Vitals and nursing note reviewed.   Constitutional:       General: She is not in acute distress.     Appearance: Normal appearance. She is well-developed.   Neck:      Vascular: No carotid bruit.   Cardiovascular:      Rate and Rhythm: Normal rate and regular rhythm.      Heart sounds: No murmur heard.  Pulmonary:      Effort: " Pulmonary effort is normal. No respiratory distress.      Breath sounds: Normal breath sounds.   Musculoskeletal:      Cervical back: Normal range of motion and neck supple.      Right lower leg: No edema.      Left lower leg: No edema.   Lymphadenopathy:      Cervical: No cervical adenopathy.   Skin:     Comments: Right thumbnail with thickened yellow area distal nail   Neurological:      Mental Status: She is alert.   Psychiatric:         Mood and Affect: Mood normal.         Behavior: Behavior normal.         Thought Content: Thought content normal.         Judgment: Judgment normal.

## 2025-04-07 DIAGNOSIS — E66.9 OBESITY WITHOUT SERIOUS COMORBIDITY, UNSPECIFIED CLASS, UNSPECIFIED OBESITY TYPE: Primary | ICD-10-CM

## 2025-04-08 RX ORDER — TIRZEPATIDE 5 MG/.5ML
INJECTION, SOLUTION SUBCUTANEOUS
Qty: 2 ML | Refills: 0 | Status: SHIPPED | OUTPATIENT
Start: 2025-04-08

## 2025-04-10 ENCOUNTER — OFFICE VISIT (OUTPATIENT)
Dept: URGENT CARE | Facility: CLINIC | Age: 50
End: 2025-04-10
Payer: COMMERCIAL

## 2025-04-10 VITALS
RESPIRATION RATE: 18 BRPM | DIASTOLIC BLOOD PRESSURE: 78 MMHG | TEMPERATURE: 97.8 F | OXYGEN SATURATION: 98 % | HEIGHT: 62 IN | WEIGHT: 135 LBS | BODY MASS INDEX: 24.84 KG/M2 | HEART RATE: 92 BPM | SYSTOLIC BLOOD PRESSURE: 128 MMHG

## 2025-04-10 DIAGNOSIS — J02.9 PHARYNGITIS, UNSPECIFIED ETIOLOGY: Primary | ICD-10-CM

## 2025-04-10 LAB — S PYO AG THROAT QL: NEGATIVE

## 2025-04-10 PROCEDURE — 99203 OFFICE O/P NEW LOW 30 MIN: CPT | Performed by: PHYSICIAN ASSISTANT

## 2025-04-10 PROCEDURE — 87880 STREP A ASSAY W/OPTIC: CPT | Performed by: PHYSICIAN ASSISTANT

## 2025-04-10 NOTE — PROGRESS NOTES
[facility] Now        NAME: Lolis Huber is a 50 y.o. female  : 1975    MRN: [id number]  DATE: April 10, 2025  TIME: 1:37 PM    Assessment and Plan   Pharyngitis, unspecified etiology [J02.9]  1. Pharyngitis, unspecified etiology  POCT rapid ANTIGEN strepA        Discussed importance of staying hydrated. Discussed supportive care and otc meds for symptomatic relief. May use tea with honey and a cool mist humidifier for symptoms. Encouraged salt water gargles if sore throat. Discussed strict return to care precautions as well as red flag symptoms which should prompt immediate ED referral. Pt verbalized understanding and is in agreement with plan.  Please follow up with your primary care provider within the next week. Please remember that your visit today was with an urgent care provider and should not replace follow up with your primary care provider for chronic medical issues or annual physicals.       Patient Instructions       Follow up with PCP in 3-5 days.  Proceed to  ER if symptoms worsen.    If tests are performed, our office will contact you with results only if changes need to made to the care plan discussed with you at the visit. [facility]    Chief Complaint     Chief Complaint   Patient presents with    Sore Throat      Sore throat started today.          History of Present Illness       Lolis Huber is a(n) 50 y.o. female presenting with URI symptoms x <1 days  Past medical history: none pertinent  Congestion: runny nose  Sore throat: yes  Cough: no  Sputum production: no  Fever: no  Body aches: no  GI symptoms: no  Known sick contacts: no  OTC meds tried: none        Review of Systems   Review of Systems   Constitutional:         Negative except as noted in HPI   Respiratory:  Negative for shortness of breath.    Cardiovascular:  Negative for chest pain.         Current Medications       Current Outpatient Medications:     NIFEdipine  0.3%-lidocaine 5% rectal ointment, , Disp: , Rfl:     terbinafine (LamISIL) 250 mg tablet, Take 1 tablet (250 mg total) by mouth daily, Disp: 30 tablet, Rfl: 1    tirzepatide (Zepbound) 5 mg/0.5 mL auto-injector, Inject 0.5 mL (5 mg total) under the skin once a week, Disp: 2 mL, Rfl: 0    Zepbound 5 MG/0.5ML auto-injector, INJECT 5MG SUBCUTANEOUSLY (UNDER THE SKIN) ONCE WEEKLY, Disp: 2 mL, Rfl: 0    naproxen (NAPROSYN) 500 mg tablet, Take 1 tablet (500 mg total) by mouth 2 (two) times a day with meals for 5 days For pain (Patient not taking: Reported on 3/28/2025), Disp: 10 tablet, Rfl: 0    ondansetron (ZOFRAN) 4 mg tablet, Take 1 tablet (4 mg total) by mouth 2 (two) times a day as needed for nausea or vomiting (Patient not taking: Reported on 2/17/2025), Disp: 30 tablet, Rfl: 0    oxybutynin (DITROPAN) 5 mg tablet, Take 1 tablet (5 mg total) by mouth every 6 (six) hours as needed (bladder spasms) (Patient not taking: Reported on 2/17/2025), Disp: 30 tablet, Rfl: 0    Zepbound 5 MG/0.5ML auto-injector, INJECT 5MG SUBCUTANEOUSLY (UNDER THE SKIN) ONCE WEEKLY (Patient not taking: Reported on 4/10/2025), Disp: 2 mL, Rfl: 0    Zepbound 5 MG/0.5ML auto-injector, INJECT 5MG SUBCUTANEOUSLY (UNDER THE SKIN) ONCE WEEKLY (Patient not taking: Reported on 4/10/2025), Disp: 2 mL, Rfl: 0    Current Allergies     Allergies as of 04/10/2025 - Reviewed 04/10/2025   Allergen Reaction Noted    Bactrim [sulfamethoxazole-trimethoprim] Other (See Comments) 01/07/2019    Contrast dye [iodinated contrast media] Rash 12/15/2020            The following portions of the patient's history were reviewed and updated as appropriate: allergies, current medications, past family history, past medical history, past social history, past surgical history and problem list.     Past Medical History:   Diagnosis Date    Abnormal Pap smear of cervix     Alcohol intoxication (HCC) 08/19/2021    ED visit    Allergies     bactrim, IV contrast dye    Double  "vision 2020    Last assessed 2021    Enlarged pituitary gland (HCC)     Excessive crying of adult 2020    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 2020    Last assessed 2023    Hypokalemia 2024    Lateral epicondylitis of left elbow 2019    Last assessed 2019    Nephrolithiasis 2024    New daily persistent headache 2020    Last assessed 12/15/2020    Pre-operative clearance 2023    Shoulder pain, right 2019       Past Surgical History:   Procedure Laterality Date     SECTION      x 1    CHOLECYSTECTOMY  2016    COLONOSCOPY  2019    to be done in 5 years ()    COLONOSCOPY  2016    FL RETROGRADE PYELOGRAM  2024    GALLBLADDER SURGERY      KNEE SURGERY      MAMMO (HISTORICAL) Bilateral 2022    NORMAL    VA CYSTO/URETERO W/LITHOTRIPSY &INDWELL STENT INSRT Left 2024    Procedure: CYSTOSCOPY, LEFT URETEROSCOPY, LEFT RETROGRADE PYELOGRAM, BASKET STONE EXTRACTION, AND INSERTION STENT LEFT URETERAL;  Surgeon: James Yoo MD;  [facility];  Service: Urology    TUBAL LIGATION         Family History   Problem Relation Age of Onset    Rheum arthritis Mother     Lupus Mother     Arthritis Mother         Rheumatoid    Cancer Father     Heart disease Father         bypass    Melanoma Father     Liver cancer Maternal Grandmother     No Known Problems Sister     No Known Problems Brother     No Known Problems Son     No Known Problems Daughter     No Known Problems Sister     No Known Problems Daughter     No Known Problems Daughter     No Known Problems Daughter          Medications have been verified.        Objective   /78   Pulse 92   Temp 97.8 °F (36.6 °C)   Resp 18   Ht 5' 2\" (1.575 m)   Wt 61.2 kg (135 lb)   LMP 02/15/2023   SpO2 98%   BMI 24.69 kg/m²        Physical Exam     Physical Exam  Vitals and nursing note reviewed.   Constitutional:       General: She is not in acute distress.   "   Appearance: Normal appearance. She is not toxic-appearing.   HENT:      Head: Normocephalic and atraumatic.      Right Ear: Tympanic membrane, ear canal and external ear normal.      Left Ear: Tympanic membrane, ear canal and external ear normal.      Nose: No congestion.      Mouth/Throat:      Mouth: Mucous membranes are moist.      Pharynx: Oropharynx is clear. No oropharyngeal exudate or posterior oropharyngeal erythema.   Eyes:      Conjunctiva/sclera: Conjunctivae normal.      Pupils: Pupils are equal, round, and reactive to light.   Cardiovascular:      Rate and Rhythm: Normal rate and regular rhythm.      Heart sounds: Normal heart sounds.   Pulmonary:      Effort: Pulmonary effort is normal. No respiratory distress.      Breath sounds: Normal breath sounds. No wheezing, rhonchi or rales.   Musculoskeletal:      Cervical back: Normal range of motion and neck supple.   Lymphadenopathy:      Cervical: No cervical adenopathy.   Skin:     General: Skin is warm and dry.      Capillary Refill: Capillary refill takes less than 2 seconds.   Neurological:      Mental Status: She is alert and oriented to person, place, and time.   Psychiatric:         Behavior: Behavior normal.

## 2025-04-22 NOTE — PROGRESS NOTES
Name: Lolis Huber      : 1975      MRN: [id number]  Encounter Provider: Inessa Parada MD  Encounter Date: 2025   Encounter department: [facility] [place]  :  Assessment & Plan           History of Present Illness {?Quick Links Encounters * My Last Note * Last Note in Specialty * Snapshot * Since Last Visit * History :62117}  HPI    Lolis Huber presents for follow up to anal fissure; external hemorrhoidal skin tags.     Last seen in the office on 2025; NIFEdipine 0.3%-lidocaine 5% rectal ointment prescribed at the time.     Last colonoscopy performed on 5/3/2024 by Dr. Tapia, with a 7 year recall.     Review of Systems    Objective {?Quick Links Trend Vitals * Enter New Vitals * Results Review * Timeline (Adult) * Labs * Imaging * Cardiology * Procedures * Lung Cancer Screening * Surgical eConsent :93215}  LMP 02/15/2023      Physical Exam  {Administrative / Billing Section (Optional):56041}

## 2025-04-25 ENCOUNTER — OFFICE VISIT (OUTPATIENT)
Age: 50
End: 2025-04-25
Payer: COMMERCIAL

## 2025-04-25 VITALS
HEIGHT: 62 IN | BODY MASS INDEX: 24.11 KG/M2 | SYSTOLIC BLOOD PRESSURE: 118 MMHG | DIASTOLIC BLOOD PRESSURE: 68 MMHG | WEIGHT: 131 LBS

## 2025-04-25 DIAGNOSIS — K60.2 ANAL FISSURE: Primary | ICD-10-CM

## 2025-04-25 PROCEDURE — 99212 OFFICE O/P EST SF 10 MIN: CPT | Performed by: SURGERY

## 2025-04-25 NOTE — PATIENT INSTRUCTIONS
Apply topical nifedipine (compounded ointment) around the anus three times a day for two months.

## 2025-04-28 ENCOUNTER — TELEPHONE (OUTPATIENT)
Age: 50
End: 2025-04-28

## 2025-04-28 ENCOUNTER — APPOINTMENT (OUTPATIENT)
Dept: LAB | Facility: CLINIC | Age: 50
End: 2025-04-28
Payer: COMMERCIAL

## 2025-04-28 DIAGNOSIS — B35.1 ONYCHOMYCOSIS: ICD-10-CM

## 2025-04-28 LAB
ALBUMIN SERPL BCG-MCNC: 4.1 G/DL (ref 3.5–5)
ALP SERPL-CCNC: 51 U/L (ref 34–104)
ALT SERPL W P-5'-P-CCNC: 9 U/L (ref 7–52)
AST SERPL W P-5'-P-CCNC: 15 U/L (ref 13–39)
BILIRUB DIRECT SERPL-MCNC: 0.04 MG/DL (ref 0–0.2)
BILIRUB SERPL-MCNC: 0.47 MG/DL (ref 0.2–1)
PROT SERPL-MCNC: 7.7 G/DL (ref 6.4–8.4)

## 2025-04-28 PROCEDURE — 80076 HEPATIC FUNCTION PANEL: CPT

## 2025-04-28 PROCEDURE — 36415 COLL VENOUS BLD VENIPUNCTURE: CPT

## 2025-04-29 ENCOUNTER — OFFICE VISIT (OUTPATIENT)
Age: 50
End: 2025-04-29
Payer: COMMERCIAL

## 2025-04-29 VITALS — TEMPERATURE: 97.5 F | WEIGHT: 132 LBS | HEIGHT: 62 IN | BODY MASS INDEX: 24.29 KG/M2

## 2025-04-29 DIAGNOSIS — L91.0 KELOID SCAR: Primary | ICD-10-CM

## 2025-04-29 PROCEDURE — 11900 INJECT SKIN LESIONS </W 7: CPT | Performed by: DERMATOLOGY

## 2025-04-29 RX ORDER — TRIAMCINOLONE ACETONIDE 40 MG/ML
40 INJECTION, SUSPENSION INTRA-ARTICULAR; INTRAMUSCULAR ONCE
Status: COMPLETED | OUTPATIENT
Start: 2025-04-29 | End: 2025-04-29

## 2025-04-29 RX ADMIN — TRIAMCINOLONE ACETONIDE 40 MG: 40 INJECTION, SUSPENSION INTRA-ARTICULAR; INTRAMUSCULAR at 13:22

## 2025-04-29 NOTE — PROGRESS NOTES
"[facility] Note     Patient Name: Lolis Huber  Encounter Date: 4/29/25       Have you been cared for by a [facility] Dermatologist in the last 3 years and, if so, which description applies to you? Yes. I have been here within the last 3 years, and my medical history has NOT changed since that time. I am of child-bearing potential.     REVIEW OF SYSTEMS:  Have you recently had or currently have any of the following? No changes in my recent health.   PAST MEDICAL HISTORY:  Have you personally ever had or currently have any of the following?  If \"YES,\" then please provide more detail. No changes in my medical history.   HISTORY OF IMMUNOSUPPRESSION: Do you have a history of any of the following:  Systemic Immunosuppression such as Diabetes, Biologic or Immunotherapy, Chemotherapy, Organ Transplantation, Bone Marrow Transplantation or Prednisone?  No     Answering \"YES\" requires the addition of the dotphrase \"IMMUNOSUPPRESSED\" as the first diagnosis of the patient's visit.   FAMILY HISTORY:  Any \"first degree relatives\" (parent, brother, sister, or child) with the following?    No changes in my family's known health.   PATIENT EXPERIENCE:    Do you want the Dermatologist to perform a COMPLETE skin exam today including a clinical examination under the \"bra and underwear\" areas?  NO  If necessary, do we have your permission to call and leave a detailed message on your Preferred Phone number that includes your specific medical information?  Yes      Allergies   Allergen Reactions   • Bactrim [Sulfamethoxazole-Trimethoprim] Other (See Comments)     Suicidal   • Contrast Dye [Iodinated Contrast Media] Rash      Current Outpatient Medications:   •  NIFEdipine 0.3%-lidocaine 5% rectal ointment, , Disp: , Rfl:   •  terbinafine (LamISIL) 250 mg tablet, Take 1 tablet (250 mg total) by mouth daily, Disp: 30 tablet, Rfl: 1  •  tirzepatide (Zepbound) 5 mg/0.5 mL auto-injector, Inject 0.5 mL (5 mg total) under " the skin once a week, Disp: 2 mL, Rfl: 0  •  naproxen (NAPROSYN) 500 mg tablet, Take 1 tablet (500 mg total) by mouth 2 (two) times a day with meals for 5 days For pain (Patient not taking: Reported on 3/28/2025), Disp: 10 tablet, Rfl: 0  •  ondansetron (ZOFRAN) 4 mg tablet, Take 1 tablet (4 mg total) by mouth 2 (two) times a day as needed for nausea or vomiting (Patient not taking: Reported on 4/29/2025), Disp: 30 tablet, Rfl: 0  •  oxybutynin (DITROPAN) 5 mg tablet, Take 1 tablet (5 mg total) by mouth every 6 (six) hours as needed (bladder spasms) (Patient not taking: Reported on 2/17/2025), Disp: 30 tablet, Rfl: 0  •  Zepbound 5 MG/0.5ML auto-injector, INJECT 5MG SUBCUTANEOUSLY (UNDER THE SKIN) ONCE WEEKLY (Patient not taking: Reported on 4/29/2025), Disp: 2 mL, Rfl: 0  •  Zepbound 5 MG/0.5ML auto-injector, INJECT 5MG SUBCUTANEOUSLY (UNDER THE SKIN) ONCE WEEKLY (Patient not taking: Reported on 4/29/2025), Disp: 2 mL, Rfl: 0  •  Zepbound 5 MG/0.5ML auto-injector, INJECT 5MG SUBCUTANEOUSLY (UNDER THE SKIN) ONCE WEEKLY (Patient not taking: Reported on 4/29/2025), Disp: 2 mL, Rfl: 0  No current facility-administered medications for this visit.              Whom besides the patient is providing clinical information about today's encounter?   NO ADDITIONAL HISTORIAN (patient alone provided history)    Physical Exam and Assessment/Plan by Diagnosis:      KELOID SCAR FOLLOW UP    Physical Exam:  Anatomic Location Affected:  Left Upper Arm, mid abdomen  Morphological Description:  pink to brown firm plaques  Pertinent Positives:  Pertinent Negatives:    Additional History of Present Condition:  kenalog injection administered by Dr. Vizcaino at last office visit 2/17/25. No itching, not as puffy on the left arm, abdomen is still same size.    Assessment and Plan:  Based on a thorough discussion of this condition and the management approach to it (including a comprehensive discussion of the known risks, side effects and  potential benefits of treatment), the patient (family) agrees to implement the following specific plan:  Kenalog 40 administered to the left upper arm and abdomen at the visit today.    A keloid scar is a firm, smooth, hard growth due to spontaneous scar formation. It can arise soon after an injury, or develop months later. Keloids may be uncomfortable or itchy and extend well beyond the original wound. They may form on any part of the body, although the upper chest and shoulders are especially prone to them.    The precise reason that wound healing sometimes leads to keloid formation is under investigation but is not yet clear.  While most people never form keloids, others develop them after minor injuries, burns, insect bites and acne spots. Dark skinned people form keloids more easily than Caucasians. A keloid is harmless to general health and does not change into skin cancer.    The following measures are helpful in at least some patients.  Emollients (creams and oils)  Polyurethane or silicone scar reduction patches  Silicone gel  Oral or topical tranilast (an inhibitor of collagen synthesis)  Pressure dressings  Surgical excision (but in keloids, excision may result in a new keloid even larger than the original one)  Intralesional corticosteroid injection, repeated every few weeks  Intralesional 5-fluorouracil  Cryotherapy  Superficial X-ray treatment soon after surgery.  Pulsed dye laser   Skin needling  Subcision    Scar dressings should be worn for 12-24 hours per day, for at least 8 to 12 weeks, and perhaps for much longer.     PROCEDURE:  INTRALESIONAL STEROID INJECTION (KENALOG INJECTION)    Purpose: Triamcinolone is a synthetic glucocorticoid corticosteroid that has marked anti-inflammatory action. It is prepared in sterile aqueous suspension suitable for injecting directly into a lesion on or immediately below the skin to treat a dermal inflammatory process.     Indications: It is indicated for  alopecia areata; inflammatory acne cysts; discoid lupus erythematosus; keloids and hypertrophic scars; inflammatory lesions of granuloma annulare, lichen planus, lichen simplex chronicus (neurodermatitis), psoriatic plaques, and other localized inflammatory skin conditions.     Potential Side Effects: I understand that triamcinolone injection can potentially cause early and/or delayed adverse effects such as:   • Pain   • Impaired wound healing   • Increased hair growth   • Bleeding   • White or brown marks   • Steroid acne   • Infection   • Telangiectasia   • Skin thinning   • Cutaneous and subcutaneous lipoatrophy (most common) appearing as skin indentations or dimples around the injection sites a few weeks after treatment     PROCEDURE NOTE:  After verbal and written consent were obtained, the to-be-treated area was wiped and cleaned with rubbing alcohol 70%.      Then, a total of 1 mL of Kenalog CONCENTRATION:  40 mg/mL   (Lot# [id number]; Expiration May 2026, NDC#: 4333-9072-72) was injected intralesionally into a total of 2 large lesion/s on the following anatomic areas:  left upper arm and mid abdomen using a 1-mL syringe and a 30-gauge needle.      There was less than 1 mL of blood loss and little to no discomfort.  The area was bandaged with a Band-aid.  The patient tolerated the procedure well and remained in the office for observation.  With no signs of an adverse reaction, the patient was eventually discharged from clinic.       Scribe Attestation    I,:  Amanda Ball MA am acting as a scribe while in the presence of the attending physician.:       I,:  Ina Kearney MD personally performed the services described in this documentation    as scribed in my presence.:

## 2025-04-30 ENCOUNTER — NURSE TRIAGE (OUTPATIENT)
Age: 50
End: 2025-04-30

## 2025-04-30 NOTE — TELEPHONE ENCOUNTER
Patient complains of anal itching just after her office visit.  She has been using the nifedipine TID.  I did advise that she try using Desitin to create a protective skin barrier.      Please advise if there is anything else that you would like her to try.  She was last seen in the office on 4/25 for anal fissure.

## 2025-05-01 NOTE — TELEPHONE ENCOUNTER
Attempted to contact pt, however have been unable to reach; a v/m has been left for pt w/ provider's recommendations.

## 2025-05-02 NOTE — PROGRESS NOTES
Name: Lolis Huber      : 1975      MRN: [id number]  Encounter Provider: Inessa Parada MD  Encounter Date: 2025   [facility]  :  Assessment & Plan  Anal fissure  Discussed use of topical nifedipine 3 times a day for 2 months patient will follow-up in 2 months to assess symptoms  Will follow up in two months to assess symptoms         Assessment & Plan        History of Present Illness   HPI  Lolis Huber is a 50 y.o. female who presents for follow up to anal fissure; external hemorrhoidal skin tags.     Last seen in the office on 2025; NIFEdipine 0.3%-lidocaine 5% rectal ointment prescribed at the time.     Last colonoscopy performed on 5/3/2024 by Dr. Tapia, with a 7 year recall.   History obtained from: patient    Review of Systems   Constitutional:  Negative for chills and fever.   HENT:  Negative for ear pain and sore throat.    Eyes:  Negative for pain and visual disturbance.   Respiratory:  Negative for cough and shortness of breath.    Cardiovascular:  Negative for chest pain and palpitations.   Gastrointestinal:  Negative for abdominal pain and vomiting.   Genitourinary:  Negative for dysuria and hematuria.   Musculoskeletal:  Negative for arthralgias and back pain.   Skin:  Negative for color change and rash.   Neurological:  Negative for seizures and syncope.   All other systems reviewed and are negative.    Past Medical History   Past Medical History:   Diagnosis Date    Abnormal Pap smear of cervix     Alcohol intoxication (HCC) 2021    ED visit    Allergies     bactrim, IV contrast dye    Double vision 2020    Last assessed 2021    Enlarged pituitary gland (HCC)     Excessive crying of adult 2020    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 2020    Last assessed 2023    Hypokalemia 2024    Lateral epicondylitis of left elbow 2019    Last assessed 2019     Nephrolithiasis 2024    New daily persistent headache 2020    Last assessed 12/15/2020    Pre-operative clearance 2023    Shoulder pain, right 2019     Past Surgical History:   Procedure Laterality Date     SECTION      x 1    CHOLECYSTECTOMY  2016    COLONOSCOPY  2019    to be done in 5 years ()    COLONOSCOPY  2016    FL RETROGRADE PYELOGRAM  2024    GALLBLADDER SURGERY      KNEE SURGERY      MAMMO (HISTORICAL) Bilateral 2022    NORMAL    [place] CYSTO/URETERO W/LITHOTRIPSY &INDWELL STENT INSRT Left 2024    Procedure: CYSTOSCOPY, LEFT URETEROSCOPY, LEFT RETROGRADE PYELOGRAM, BASKET STONE EXTRACTION, AND INSERTION STENT LEFT URETERAL;  Surgeon: James Yoo MD;  Location[facility];  Service: Urology    TUBAL LIGATION       Family History   Problem Relation Age of Onset    Rheum arthritis Mother     Lupus Mother     Arthritis Mother         Rheumatoid    Cancer Father     Heart disease Father         bypass    Melanoma Father     Liver cancer Maternal Grandmother     No Known Problems Sister     No Known Problems Brother     No Known Problems Son     No Known Problems Daughter     No Known Problems Sister     No Known Problems Daughter     No Known Problems Daughter     No Known Problems Daughter       reports that she has never smoked. She has never used smokeless tobacco. She reports current alcohol use. She reports that she does not use drugs.  Current Outpatient Medications   Medication Instructions    naproxen (NAPROSYN) 500 mg, Oral, 2 times daily with meals, For pain    NIFEdipine 0.3%-lidocaine 5% rectal ointment     ondansetron (ZOFRAN) 4 mg, Oral, 2 times daily PRN    oxybutynin (DITROPAN) 5 mg, Oral, Every 6 hours PRN    terbinafine (LAMISIL) 250 mg, Oral, Daily    Zepbound 5 MG/0.5ML auto-injector INJECT 5MG SUBCUTANEOUSLY (UNDER THE SKIN) ONCE WEEKLY    Zepbound 5 MG/0.5ML auto-injector INJECT 5MG SUBCUTANEOUSLY (UNDER THE  "SKIN) ONCE WEEKLY    Zepbound 5 MG/0.5ML auto-injector INJECT 5MG SUBCUTANEOUSLY (UNDER THE SKIN) ONCE WEEKLY    Zepbound 5 mg, Subcutaneous, Weekly     Allergies   Allergen Reactions    Bactrim [Sulfamethoxazole-Trimethoprim] Other (See Comments)     Suicidal    Contrast Dye [Iodinated Contrast Media] Rash         Objective   /68   Ht 5' 2\" (1.575 m)   Wt 59.4 kg (131 lb)   LMP 02/15/2023   BMI 23.96 kg/m²      Physical Exam  Vitals and nursing note reviewed.   Constitutional:       General: She is not in acute distress.     Appearance: She is well-developed.   HENT:      Head: Normocephalic and atraumatic.   Eyes:      Conjunctiva/sclera: Conjunctivae normal.   Cardiovascular:      Rate and Rhythm: Normal rate and regular rhythm.      Heart sounds: No murmur heard.  Pulmonary:      Effort: Pulmonary effort is normal. No respiratory distress.      Breath sounds: Normal breath sounds.   Abdominal:      Palpations: Abdomen is soft.      Tenderness: There is no abdominal tenderness.   Musculoskeletal:         General: No swelling.      Cervical back: Neck supple.   Skin:     General: Skin is warm and dry.      Capillary Refill: Capillary refill takes less than 2 seconds.   Neurological:      Mental Status: She is alert.   Psychiatric:         Mood and Affect: Mood normal.         Administrative Statements   I have spent a total time of 16 minutes in caring for this patient on the day of the visit/encounter including Diagnostic results, Prognosis, Risks and benefits of tx options, Instructions for management, Patient and family education, Importance of tx compliance, Risk factor reductions, Impressions, Counseling / Coordination of care, Documenting in the medical record, Reviewing/placing orders in the medical record (including tests, medications, and/or procedures), Obtaining or reviewing history  , and Communicating with other healthcare professionals .  "

## 2025-05-05 DIAGNOSIS — E66.9 OBESITY WITHOUT SERIOUS COMORBIDITY, UNSPECIFIED CLASS, UNSPECIFIED OBESITY TYPE: ICD-10-CM

## 2025-05-05 RX ORDER — TIRZEPATIDE 5 MG/.5ML
INJECTION, SOLUTION SUBCUTANEOUS
Qty: 2 ML | Refills: 0 | Status: SHIPPED | OUTPATIENT
Start: 2025-05-05

## 2025-05-24 DIAGNOSIS — B35.1 ONYCHOMYCOSIS: ICD-10-CM

## 2025-05-27 ENCOUNTER — TELEPHONE (OUTPATIENT)
Age: 50
End: 2025-05-27

## 2025-05-27 RX ORDER — TERBINAFINE HYDROCHLORIDE 250 MG/1
250 TABLET ORAL DAILY
Qty: 30 TABLET | Refills: 1 | Status: SHIPPED | OUTPATIENT
Start: 2025-05-27 | End: 2025-06-26

## 2025-05-27 NOTE — TELEPHONE ENCOUNTER
Patient is completing her 2nd  month of terbinafine Does patient need labs again or a fu appointment Please advise

## 2025-05-27 NOTE — TELEPHONE ENCOUNTER
I forget if she's on it for nail fungus? Is it looking better? Usually it's 8-12 weeks, she can always split the difference and do 10 weeks

## 2025-05-28 ENCOUNTER — OFFICE VISIT (OUTPATIENT)
Dept: FAMILY MEDICINE CLINIC | Facility: CLINIC | Age: 50
End: 2025-05-28
Payer: COMMERCIAL

## 2025-05-28 VITALS
HEART RATE: 80 BPM | RESPIRATION RATE: 16 BRPM | WEIGHT: 129 LBS | BODY MASS INDEX: 23.74 KG/M2 | OXYGEN SATURATION: 98 % | HEIGHT: 62 IN | DIASTOLIC BLOOD PRESSURE: 70 MMHG | SYSTOLIC BLOOD PRESSURE: 122 MMHG

## 2025-05-28 DIAGNOSIS — B35.1 ONYCHOMYCOSIS: Primary | ICD-10-CM

## 2025-05-28 DIAGNOSIS — S60.111A SUBUNGUAL HEMATOMA OF RIGHT THUMB, INITIAL ENCOUNTER: ICD-10-CM

## 2025-05-28 PROCEDURE — 99213 OFFICE O/P EST LOW 20 MIN: CPT | Performed by: FAMILY MEDICINE

## 2025-05-28 NOTE — PROGRESS NOTES
"Name: Lolis Huber      : 1975      MRN: [id number]  Encounter Provider: Viola Alba MD  Encounter Date: 2025   Encounter department: [facility]  :  Assessment & Plan  Onychomycosis  Pt completed 2 mo lamisal needs to wait for nail to grow out          Subungual hematoma of right thumb, initial encounter  Nail is lifting off new nail growing in keep covered  and do not lift nail off until it falls off naturally                History of Present Illness   Pt stated treatment for right thumb nail fungus  pt slammed thmb in car door a few days after and wants it checked       Review of Systems   Skin:         Right thumb nail  discoloration no pain       Objective   /70 (BP Location: Left arm, Patient Position: Sitting, Cuff Size: Standard)   Pulse 80   Resp 16   Ht 5' 2\" (1.575 m)   Wt 58.5 kg (129 lb)   LMP 02/15/2023   SpO2 98%   BMI 23.59 kg/m²      Physical Exam    Skin:     Comments: Right thumb nail lifting off at lower end dark congealed blood under nail new nail growing in smoothly and  normal in color         "

## 2025-06-02 ENCOUNTER — TELEPHONE (OUTPATIENT)
Age: 50
End: 2025-06-02

## 2025-06-02 DIAGNOSIS — E66.9 OBESITY (BMI 30-39.9): Primary | ICD-10-CM

## 2025-06-02 RX ORDER — TIRZEPATIDE 5 MG/.5ML
INJECTION, SOLUTION SUBCUTANEOUS
Qty: 2 ML | Refills: 0 | Status: SHIPPED | OUTPATIENT
Start: 2025-06-02

## 2025-06-04 NOTE — TELEPHONE ENCOUNTER
Left VM for pt stating Dr. Parada does not prescribe any pre-procedural medications.   
the patient called she has an appointment on 6/26/2025 for 2 month follow up.  Patient stated that Dr. Parada will be doing a hemorrhoid procedure.  She wanted to know if Dr. Parada prescribe something as she is a bit anxious and would like something to help her relax prior to the procedure, she is willing to have someone drive her the appointment.  Patient uses [facility] on [address] [facility].     
Pacu and floor

## 2025-06-26 ENCOUNTER — OFFICE VISIT (OUTPATIENT)
Age: 50
End: 2025-06-26
Payer: COMMERCIAL

## 2025-06-26 VITALS — WEIGHT: 124 LBS | HEIGHT: 62 IN | BODY MASS INDEX: 22.82 KG/M2

## 2025-06-26 DIAGNOSIS — K60.2 ANAL FISSURE: Primary | ICD-10-CM

## 2025-06-26 PROCEDURE — 99212 OFFICE O/P EST SF 10 MIN: CPT | Performed by: SURGERY

## 2025-06-26 NOTE — PROGRESS NOTES
:  Assessment & Plan  Anal fissure    Orders:    NIFEdipine 0.3%-lidocaine 5% rectal ointment; Apply 1 Application topically 3 (three) times a day Apply a small amount to anal fissure three times a day for two months      Assessment & Plan  1. Anal fissure.  She reports feeling better from the fissure but experienced difficulty with bowel movements after consuming greasy and spicy foods. She is advised to avoid foods high in acid, such as tomato, citrus, lemon, spicy foods, caffeine, chocolate, and alcohol. If she consumes these foods, she should stay well-hydrated and eat a lot of fiber. A list of foods to avoid will be provided.    2. Skin tags.  She expressed interest in having her skin tags removed.      History of Present Illness     Lolis Huber is a 50 y.o. female   History of Present Illness  The patient is a 50-year-old female who presents for follow-up of a fissure and skin tags.    She reports an improvement in her condition, attributing it to the consistent use of Rectiv. However, she continues to experience hemorrhoids. She has been gradually reducing her medication intake and expresses satisfaction with her weight loss, although she currently feels underweight. During the weeks when she abstains from medication, she increases her food intake and engages in Pilates and weight training exercises. Her diet primarily consists of meat, vegetables, yogurt bowls, cottage cheese, and alkaline water. She recalls a recent incident where consumption of greasy food, specifically one slice of pizza and two pieces of boneless sweet chili wings, resulted in difficulty with bowel movements. The following night, she experienced severe stomach cramps that prevented her from sleeping until she was able to defecate. Despite the absence of bleeding or tearing, she subsequently developed diarrhea and felt weak, with persistent stomach discomfort throughout the night.    MEDICATIONS  Current: Rectiv  Review of Systems  "  Constitutional:  Negative for chills and fever.   HENT:  Negative for ear pain and sore throat.    Eyes:  Negative for pain and visual disturbance.   Respiratory:  Negative for cough and shortness of breath.    Cardiovascular:  Negative for chest pain and palpitations.   Gastrointestinal:  Negative for abdominal pain and vomiting.   Genitourinary:  Negative for dysuria and hematuria.   Musculoskeletal:  Negative for arthralgias and back pain.   Skin:  Negative for color change and rash.   Neurological:  Negative for seizures and syncope.   All other systems reviewed and are negative.    Objective   Ht 5' 2\" (1.575 m)   Wt 56.2 kg (124 lb)   LMP 02/15/2023   BMI 22.68 kg/m²      Physical Exam  Vitals and nursing note reviewed.   Constitutional:       General: She is not in acute distress.     Appearance: She is well-developed.   HENT:      Head: Normocephalic and atraumatic.     Eyes:      Conjunctiva/sclera: Conjunctivae normal.       Cardiovascular:      Rate and Rhythm: Normal rate and regular rhythm.      Heart sounds: No murmur heard.  Pulmonary:      Effort: Pulmonary effort is normal. No respiratory distress.      Breath sounds: Normal breath sounds.   Abdominal:      Palpations: Abdomen is soft.      Tenderness: There is no abdominal tenderness.     Musculoskeletal:         General: No swelling.      Cervical back: Neck supple.     Skin:     General: Skin is warm and dry.      Capillary Refill: Capillary refill takes less than 2 seconds.     Neurological:      Mental Status: She is alert.     Psychiatric:         Mood and Affect: Mood normal.       Physical Exam      Results    Administrative Statements   I have spent a total time of 12 minutes in caring for this patient on the day of the visit/encounter including Diagnostic results, Prognosis, Risks and benefits of tx options, Instructions for management, Patient and family education, Importance of tx compliance, Risk factor reductions, Impressions, " Counseling / Coordination of care, Documenting in the medical record, Reviewing/placing orders in the medical record (including tests, medications, and/or procedures), Obtaining or reviewing history  , and Communicating with other healthcare professionals .

## 2025-06-26 NOTE — PATIENT INSTRUCTIONS
Avoid overcleaning; after a bowel movement, clean the area with only water.    Avoid soaps, detergents, and lotions around your bottom.    You may use Calmoseptine or Aquaphor as a barrier ointment.    Avoid foods that are high in acidity: caffeine, chocolate, alcohol, citrus, spicy foods, and tomato.

## 2025-07-20 DIAGNOSIS — E66.9 OBESITY WITHOUT SERIOUS COMORBIDITY, UNSPECIFIED CLASS, UNSPECIFIED OBESITY TYPE: Primary | ICD-10-CM

## 2025-07-21 ENCOUNTER — OFFICE VISIT (OUTPATIENT)
Age: 50
End: 2025-07-21
Payer: COMMERCIAL

## 2025-07-21 VITALS — WEIGHT: 125 LBS | TEMPERATURE: 97.5 F | BODY MASS INDEX: 22.86 KG/M2

## 2025-07-21 DIAGNOSIS — L91.0 KELOID SCAR: Primary | ICD-10-CM

## 2025-07-21 PROCEDURE — 11900 INJECT SKIN LESIONS </W 7: CPT | Performed by: DERMATOLOGY

## 2025-07-21 PROCEDURE — 99213 OFFICE O/P EST LOW 20 MIN: CPT | Performed by: DERMATOLOGY

## 2025-07-21 RX ORDER — TIRZEPATIDE 5 MG/.5ML
INJECTION, SOLUTION SUBCUTANEOUS
Qty: 2 ML | Refills: 0 | Status: SHIPPED | OUTPATIENT
Start: 2025-07-21 | End: 2025-07-22

## 2025-07-21 RX ORDER — TRETINOIN 0.25 MG/G
CREAM TOPICAL
Qty: 30 G | Refills: 3 | Status: SHIPPED | OUTPATIENT
Start: 2025-07-21

## 2025-07-21 NOTE — PATIENT INSTRUCTIONS
KELOID SCAR FOLLOW UP     Assessment and Plan:  Based on a thorough discussion of this condition and the management approach to it (including a comprehensive discussion of the known risks, side effects and potential benefits of treatment), the patient (family) agrees to implement the following specific plan:  Kenalog 10 administered to the left upper arm and abdomen at the visit today.     A keloid scar is a firm, smooth, hard growth due to spontaneous scar formation. It can arise soon after an injury, or develop months later. Keloids may be uncomfortable or itchy and extend well beyond the original wound. They may form on any part of the body, although the upper chest and shoulders are especially prone to them.     The precise reason that wound healing sometimes leads to keloid formation is under investigation but is not yet clear.  While most people never form keloids, others develop them after minor injuries, burns, insect bites and acne spots. Dark skinned people form keloids more easily than Caucasians. A keloid is harmless to general health and does not change into skin cancer.     The following measures are helpful in at least some patients.  Emollients (creams and oils)  Polyurethane or silicone scar reduction patches  Silicone gel  Oral or topical tranilast (an inhibitor of collagen synthesis)  Pressure dressings  Surgical excision (but in keloids, excision may result in a new keloid even larger than the original one)  Intralesional corticosteroid injection, repeated every few weeks  Intralesional 5-fluorouracil  Cryotherapy  Superficial X-ray treatment soon after surgery.  Pulsed dye laser   Skin needling  Subcision     Scar dressings should be worn for 12-24 hours per day, for at least 8 to 12 weeks, and perhaps for much longer.      PROCEDURE:  INTRALESIONAL STEROID INJECTION (KENALOG INJECTION)     Purpose: Triamcinolone is a synthetic glucocorticoid corticosteroid that has marked anti-inflammatory  action. It is prepared in sterile aqueous suspension suitable for injecting directly into a lesion on or immediately below the skin to treat a dermal inflammatory process.      Indications: It is indicated for alopecia areata; inflammatory acne cysts; discoid lupus erythematosus; keloids and hypertrophic scars; inflammatory lesions of granuloma annulare, lichen planus, lichen simplex chronicus (neurodermatitis), psoriatic plaques, and other localized inflammatory skin conditions.      Potential Side Effects: I understand that triamcinolone injection can potentially cause early and/or delayed adverse effects such as:    Pain    Impaired wound healing    Increased hair growth    Bleeding    White or brown marks    Steroid acne    Infection    Telangiectasia    Skin thinning    Cutaneous and subcutaneous lipoatrophy (most common) appearing as skin indentations or dimples around the injection sites a few weeks after treatment      PROCEDURE NOTE:  After verbal and written consent were obtained, the to-be-treated area was wiped and cleaned with rubbing alcohol 70%.       Then, a total of 0.9 mL of Kenalog CONCENTRATION:  10 mg/mL   (Lot# [id number]; Expiration May 2027, NDC#: 9115-2411-75) was injected intralesionally into a total of 2 large lesion/s on the following anatomic areas:  left upper arm and mid abdomen using a 1-mL syringe and a 30-gauge needle.       There was less than 1 mL of blood loss and little to no discomfort.  The area was bandaged with a Band-aid.  The patient tolerated the procedure well and remained in the office for observation.  With no signs of an adverse reaction, the patient was eventually discharged from clinic.

## 2025-07-21 NOTE — PROGRESS NOTES
"[facility] Note     Patient Name: Lolis Huber  Encounter Date: 7/21/2025        Have you been cared for by a [facility] Dermatologist in the last 3 years and, if so, which description applies to you? Yes. I have been here within the last 3 years, and my medical history has NOT changed since that time. I am of child-bearing potential.     REVIEW OF SYSTEMS:  Have you recently had or currently have any of the following? No changes in my recent health.   PAST MEDICAL HISTORY:  Have you personally ever had or currently have any of the following?  If \"YES,\" then please provide more detail. No changes in my medical history.   HISTORY OF IMMUNOSUPPRESSION: Do you have a history of any of the following:  Systemic Immunosuppression such as Diabetes, Biologic or Immunotherapy, Chemotherapy, Organ Transplantation, Bone Marrow Transplantation or Prednisone?  No     Answering \"YES\" requires the addition of the dotphrase \"IMMUNOSUPPRESSED\" as the first diagnosis of the patient's visit.   FAMILY HISTORY:  Any \"first degree relatives\" (parent, brother, sister, or child) with the following?    No changes in my family's known health.   PATIENT EXPERIENCE:    Do you want the Dermatologist to perform a COMPLETE skin exam today including a clinical examination under the \"bra and underwear\" areas?  NO  If necessary, do we have your permission to call and leave a detailed message on your Preferred Phone number that includes your specific medical information?  Yes      Allergies[1] Current Medications[1]              Whom besides the patient is providing clinical information about today's encounter?   NO ADDITIONAL HISTORIAN (patient alone provided history)    Physical Exam and Assessment/Plan by Diagnosis:        KELOID SCAR FOLLOW UP     Physical Exam:  Anatomic Location Affected:  Left Upper Arm, mid abdomen  Morphological Description:  pink to brown firm plaques  Pertinent Positives:  Pertinent Negatives:   "   Additional History of Present Condition:  kenalog injection administered by Dr. Vizcaino at last office visit 4/29/2025. No itching, not as puffy on the left arm, abdomen is still same size.      Assessment and Plan:  Based on a thorough discussion of this condition and the management approach to it (including a comprehensive discussion of the known risks, side effects and potential benefits of treatment), the patient (family) agrees to implement the following specific plan:  Kenalog 10 administered to the left upper arm and abdomen at the visit today.  Script sent to hydroquinone blend to skin medicinals to help lighten      A keloid scar is a firm, smooth, hard growth due to spontaneous scar formation. It can arise soon after an injury, or develop months later. Keloids may be uncomfortable or itchy and extend well beyond the original wound. They may form on any part of the body, although the upper chest and shoulders are especially prone to them.     The precise reason that wound healing sometimes leads to keloid formation is under investigation but is not yet clear.  While most people never form keloids, others develop them after minor injuries, burns, insect bites and acne spots. Dark skinned people form keloids more easily than Caucasians. A keloid is harmless to general health and does not change into skin cancer.     The following measures are helpful in at least some patients.  Emollients (creams and oils)  Polyurethane or silicone scar reduction patches  Silicone gel  Oral or topical tranilast (an inhibitor of collagen synthesis)  Pressure dressings  Surgical excision (but in keloids, excision may result in a new keloid even larger than the original one)  Intralesional corticosteroid injection, repeated every few weeks  Intralesional 5-fluorouracil  Cryotherapy  Superficial X-ray treatment soon after surgery.  Pulsed dye laser   Skin needling  Subcision     Scar dressings should be worn for 12-24 hours per  day, for at least 8 to 12 weeks, and perhaps for much longer.      PROCEDURE:  INTRALESIONAL STEROID INJECTION (KENALOG INJECTION)     Purpose: Triamcinolone is a synthetic glucocorticoid corticosteroid that has marked anti-inflammatory action. It is prepared in sterile aqueous suspension suitable for injecting directly into a lesion on or immediately below the skin to treat a dermal inflammatory process.      Indications: It is indicated for alopecia areata; inflammatory acne cysts; discoid lupus erythematosus; keloids and hypertrophic scars; inflammatory lesions of granuloma annulare, lichen planus, lichen simplex chronicus (neurodermatitis), psoriatic plaques, and other localized inflammatory skin conditions.      Potential Side Effects: I understand that triamcinolone injection can potentially cause early and/or delayed adverse effects such as:   • Pain   • Impaired wound healing   • Increased hair growth   • Bleeding   • White or brown marks   • Steroid acne   • Infection   • Telangiectasia   • Skin thinning   • Cutaneous and subcutaneous lipoatrophy (most common) appearing as skin indentations or dimples around the injection sites a few weeks after treatment      PROCEDURE NOTE:  After verbal and written consent were obtained, the to-be-treated area was wiped and cleaned with rubbing alcohol 70%.       Then, a total of 0.9 mL of Kenalog CONCENTRATION:  10 mg/mL   (Lot# [id number]; Expiration May 2027, NDC#: 5639-5830-33) was injected intralesionally into a total of 2 large lesion/s on the following anatomic areas:  left upper arm and mid abdomen using a 1-mL syringe and a 30-gauge needle.       There was less than 1 mL of blood loss and little to no discomfort.  The area was bandaged with a Band-aid.  The patient tolerated the procedure well and remained in the office for observation.  With no signs of an adverse reaction, the patient was eventually discharged from clinic.          [facility] Attestation    I,:   Kaylee Mao MA am acting as a scribe while in the presence of the attending physician.:       I,:  Ina Kearney MD personally performed the services described in this documentation    as scribed in my presence.:                [1]  Allergies  Allergen Reactions   • Bactrim [Sulfamethoxazole-Trimethoprim] Other (See Comments)     Suicidal   • Contrast Dye [Iodinated Contrast Media] Rash   [1]    Current Outpatient Medications:   •  tretinoin (RETIN-A) 0.025 % cream, Use nightly for 3 months on, 3 months off., Disp: 30 g, Rfl: 3  •  NIFEdipine 0.3%-lidocaine 5% rectal ointment, , Disp: , Rfl:   •  NIFEdipine 0.3%-lidocaine 5% rectal ointment, Apply 1 Application topically 3 (three) times a day Apply a small amount to anal fissure three times a day for two months, Disp: 30 g, Rfl: 2  •  ondansetron (ZOFRAN) 4 mg tablet, Take 1 tablet (4 mg total) by mouth 2 (two) times a day as needed for nausea or vomiting (Patient not taking: Reported on 4/29/2025), Disp: 30 tablet, Rfl: 0  •  tirzepatide (Zepbound) 5 mg/0.5 mL auto-injector, Inject 0.5 mL (5 mg total) under the skin once a week, Disp: 2 mL, Rfl: 0  •  Zepbound 5 MG/0.5ML auto-injector, INJECT 5MG SUBCUTANEOUSLY (UNDER THE SKIN) ONCE WEEKLY (Patient not taking: Reported on 7/21/2025), Disp: 2 mL, Rfl: 0  •  Zepbound 5 MG/0.5ML auto-injector, INJECT 5MG SUBCUTANEOUSLY (UNDER THE SKIN) ONCE WEEKLY (Patient not taking: Reported on 7/21/2025), Disp: 2 mL, Rfl: 0  •  Zepbound 5 MG/0.5ML auto-injector, INJECT 5MG SUBCUTANEOUSLY (UNDER THE SKIN) ONCE WEEKLY (Patient not taking: Reported on 7/21/2025), Disp: 2 mL, Rfl: 0  •  Zepbound 5 MG/0.5ML auto-injector, INJECT 5MG SUBCUTANEOUSLY (UNDER THE SKIN) ONCE WEEKLY (Patient not taking: Reported on 7/21/2025), Disp: 2 mL, Rfl: 0  •  Zepbound 5 MG/0.5ML auto-injector, INJECT 5MG SUBCUTANEOUSLY (UNDER THE SKIN) ONCE WEEKLY, Disp: 2 mL, Rfl: 0  No current facility-administered medications for this visit.

## 2025-07-22 ENCOUNTER — OFFICE VISIT (OUTPATIENT)
Dept: FAMILY MEDICINE CLINIC | Facility: CLINIC | Age: 50
End: 2025-07-22
Payer: COMMERCIAL

## 2025-07-22 VITALS
DIASTOLIC BLOOD PRESSURE: 70 MMHG | WEIGHT: 125.2 LBS | OXYGEN SATURATION: 99 % | HEART RATE: 58 BPM | TEMPERATURE: 97.2 F | BODY MASS INDEX: 23.04 KG/M2 | SYSTOLIC BLOOD PRESSURE: 110 MMHG | HEIGHT: 62 IN

## 2025-07-22 DIAGNOSIS — H93.8X3 IRRITATION OF EAR, BILATERAL: Primary | ICD-10-CM

## 2025-07-22 PROCEDURE — 99213 OFFICE O/P EST LOW 20 MIN: CPT | Performed by: INTERNAL MEDICINE

## 2025-07-22 RX ORDER — CLOTRIMAZOLE AND BETAMETHASONE DIPROPIONATE 10; .64 MG/G; MG/G
CREAM TOPICAL 2 TIMES DAILY
Qty: 45 G | Refills: 3 | Status: SHIPPED | OUTPATIENT
Start: 2025-07-22

## 2025-07-22 NOTE — PROGRESS NOTES
Assessment/Plan:Looks like some irritation of ear canals b/l, could be eczematous or even fungal-will try some lotrisone installation and see how it goes         Problem List Items Addressed This Visit    None  Visit Diagnoses         Irritation of ear, bilateral    -  Primary    Relevant Medications    clotrimazole-betamethasone (LOTRISONE) 1-0.05 % cream              Subjective:      Patient ID: Lolis Huber is a 50 y.o. female.    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[facility]), Excessive crying of adult (2020), 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), Hypokalemia (2024), Lateral epicondylitis of left elbow (2019), Nephrolithiasis (2024), New daily persistent headache (2020), Pre-operative clearance (2023), and Shoulder pain, right (2019).,  _______________________________________________________________________  Medical Problems:  does not have any pertinent problems on file.,  _______________________________________________________________________  Past Surgical History:   has a past surgical history that includes Knee surgery; Gallbladder surgery;  section; Tubal ligation; Colonoscopy (2019); Cholecystectomy (2016); Colonoscopy (2016); Mammo (historical) (Bilateral, 2022); pr cysto/uretero w/lithotripsy &indwell stent insrt (Left, 2024); and [place] retrograde pyelogram  "(11/14/2024).,  _______________________________________________________________________  Family History:  family history includes Arthritis in her mother; Cancer in her father; Heart disease in her father; Liver cancer in her maternal grandmother; Lupus in her mother; Melanoma in her father; No Known Problems in her brother, daughter, daughter, daughter, daughter, sister, sister, and son; Rheum arthritis in her mother.,  _______________________________________________________________________  Social History:   reports that she has never smoked. She has never used smokeless tobacco. She reports current alcohol use. She reports that she does not use drugs.,  _______________________________________________________________________  Allergies:  is allergic to bactrim [sulfamethoxazole-trimethoprim] and contrast dye [iodinated contrast media]..  _______________________________________________________________________  Current Medications[1]  _______________________________________________________________________  Review of Systems   Constitutional: Negative.    HENT:  Positive for ear discharge. Negative for hearing loss and sinus pressure.    Respiratory: Negative.     Cardiovascular: Negative.    Gastrointestinal: Negative.    Musculoskeletal: Negative.          Objective:  Vitals:    07/22/25 1421   BP: 110/70   Pulse: 58   Temp: (!) 97.2 °F (36.2 °C)   SpO2: 99%   Weight: 56.8 kg (125 lb 3.2 oz)   Height: 5' 2\" (1.575 m)     Body mass index is 22.9 kg/m².     Physical Exam  Constitutional:       Appearance: Normal appearance.   HENT:      Head: Normocephalic and atraumatic.      Right Ear: Tympanic membrane normal.      Left Ear: Tympanic membrane normal.      Ears:      Comments: Has some dry skin ear canals b/l     Nose: Nose normal.      Mouth/Throat:      Mouth: Mucous membranes are moist.   Pulmonary:      Effort: Pulmonary effort is normal.     Neurological:      General: No focal deficit present.      Mental " Status: She is alert and oriented to person, place, and time.     Psychiatric:         Mood and Affect: Mood normal.              [1]   Current Outpatient Medications   Medication Sig Dispense Refill    clotrimazole-betamethasone (LOTRISONE) 1-0.05 % cream Apply topically 2 (two) times a day 45 g 3    NIFEdipine 0.3%-lidocaine 5% rectal ointment Apply 1 Application topically 3 (three) times a day Apply a small amount to anal fissure three times a day for two months 30 g 2    tretinoin (RETIN-A) 0.025 % cream Use nightly for 3 months on, 3 months off. 30 g 3     No current facility-administered medications for this visit.

## 2025-08-11 ENCOUNTER — PROCEDURE VISIT (OUTPATIENT)
Age: 50
End: 2025-08-11
Payer: COMMERCIAL

## 2025-08-12 ENCOUNTER — TELEPHONE (OUTPATIENT)
Age: 50
End: 2025-08-12

## 2025-08-13 PROBLEM — M79.671 PAIN OF RIGHT FOOT: Status: ACTIVE | Noted: 2025-08-13

## 2025-08-14 ENCOUNTER — OFFICE VISIT (OUTPATIENT)
Dept: FAMILY MEDICINE CLINIC | Facility: CLINIC | Age: 50
End: 2025-08-14
Payer: COMMERCIAL

## 2025-08-14 PROBLEM — R20.0 NUMBNESS OF RIGHT FOOT: Status: ACTIVE | Noted: 2025-08-14

## (undated) DEVICE — GLOVE SRG BIOGEL 7

## (undated) DEVICE — CHLORHEXIDINE 4PCT 4 OZ

## (undated) DEVICE — CATH URETERAL 5FR X 70 CM FLEX TIP POLYUR BARD

## (undated) DEVICE — PACK TUR

## (undated) DEVICE — STERILE LUBRICATING JELLY, TUBE: Brand: HR LUBRICATING JELLY

## (undated) DEVICE — UROLOGIC DRAIN BAG: Brand: UNBRANDED

## (undated) DEVICE — BASKET SPECIMEN RETRIVAL 1.9FR 120CM

## (undated) DEVICE — GUIDEWIRE STRGHT TIP 0.035 IN  SOLO PLUS

## (undated) DEVICE — INVIEW CLEAR LEGGINGS: Brand: CONVERTORS

## (undated) DEVICE — PREMIUM DRY TRAY LF: Brand: MEDLINE INDUSTRIES, INC.